# Patient Record
Sex: FEMALE | Race: BLACK OR AFRICAN AMERICAN | NOT HISPANIC OR LATINO | Employment: OTHER | URBAN - METROPOLITAN AREA
[De-identification: names, ages, dates, MRNs, and addresses within clinical notes are randomized per-mention and may not be internally consistent; named-entity substitution may affect disease eponyms.]

---

## 2017-04-27 ENCOUNTER — OFFICE VISIT (OUTPATIENT)
Dept: ENDOCRINOLOGY | Facility: CLINIC | Age: 72
End: 2017-04-27
Payer: MEDICARE

## 2017-04-27 ENCOUNTER — LAB VISIT (OUTPATIENT)
Dept: LAB | Facility: HOSPITAL | Age: 72
End: 2017-04-27
Attending: INTERNAL MEDICINE
Payer: MEDICARE

## 2017-04-27 VITALS
SYSTOLIC BLOOD PRESSURE: 160 MMHG | DIASTOLIC BLOOD PRESSURE: 84 MMHG | BODY MASS INDEX: 31.58 KG/M2 | HEART RATE: 90 BPM | HEIGHT: 62 IN | WEIGHT: 171.63 LBS

## 2017-04-27 DIAGNOSIS — I10 ESSENTIAL HYPERTENSION: ICD-10-CM

## 2017-04-27 DIAGNOSIS — Z79.4 TYPE 2 DIABETES MELLITUS WITH MICROALBUMINURIA, WITH LONG-TERM CURRENT USE OF INSULIN: Primary | ICD-10-CM

## 2017-04-27 DIAGNOSIS — E11.29 TYPE 2 DIABETES MELLITUS WITH MICROALBUMINURIA, WITH LONG-TERM CURRENT USE OF INSULIN: Primary | ICD-10-CM

## 2017-04-27 DIAGNOSIS — Z79.4 TYPE 2 DIABETES MELLITUS WITH MICROALBUMINURIA, WITH LONG-TERM CURRENT USE OF INSULIN: ICD-10-CM

## 2017-04-27 DIAGNOSIS — R80.9 TYPE 2 DIABETES MELLITUS WITH MICROALBUMINURIA, WITH LONG-TERM CURRENT USE OF INSULIN: Primary | ICD-10-CM

## 2017-04-27 DIAGNOSIS — E11.21 DIABETIC NEPHROPATHY ASSOCIATED WITH TYPE 2 DIABETES MELLITUS: ICD-10-CM

## 2017-04-27 DIAGNOSIS — E11.29 TYPE 2 DIABETES MELLITUS WITH MICROALBUMINURIA, WITH LONG-TERM CURRENT USE OF INSULIN: ICD-10-CM

## 2017-04-27 DIAGNOSIS — R80.9 TYPE 2 DIABETES MELLITUS WITH MICROALBUMINURIA, WITH LONG-TERM CURRENT USE OF INSULIN: ICD-10-CM

## 2017-04-27 LAB
ALBUMIN SERPL BCP-MCNC: 3.2 G/DL
ALP SERPL-CCNC: 93 U/L
ALT SERPL W/O P-5'-P-CCNC: 22 U/L
ANION GAP SERPL CALC-SCNC: 9 MMOL/L
AST SERPL-CCNC: 22 U/L
BILIRUB SERPL-MCNC: 0.3 MG/DL
BUN SERPL-MCNC: 18 MG/DL
CALCIUM SERPL-MCNC: 9.2 MG/DL
CHLORIDE SERPL-SCNC: 103 MMOL/L
CO2 SERPL-SCNC: 27 MMOL/L
CREAT SERPL-MCNC: 1 MG/DL
EST. GFR  (AFRICAN AMERICAN): >60 ML/MIN/1.73 M^2
EST. GFR  (NON AFRICAN AMERICAN): 56 ML/MIN/1.73 M^2
GLUCOSE SERPL-MCNC: 228 MG/DL
POTASSIUM SERPL-SCNC: 4.2 MMOL/L
PROT SERPL-MCNC: 7.6 G/DL
SODIUM SERPL-SCNC: 139 MMOL/L
T4 FREE SERPL-MCNC: 1.15 NG/DL
TSH SERPL DL<=0.005 MIU/L-ACNC: 4.83 UIU/ML

## 2017-04-27 PROCEDURE — 1160F RVW MEDS BY RX/DR IN RCRD: CPT | Mod: S$GLB,,, | Performed by: INTERNAL MEDICINE

## 2017-04-27 PROCEDURE — 4010F ACE/ARB THERAPY RXD/TAKEN: CPT | Mod: S$GLB,,, | Performed by: INTERNAL MEDICINE

## 2017-04-27 PROCEDURE — 99204 OFFICE O/P NEW MOD 45 MIN: CPT | Mod: S$GLB,,, | Performed by: INTERNAL MEDICINE

## 2017-04-27 PROCEDURE — 1126F AMNT PAIN NOTED NONE PRSNT: CPT | Mod: S$GLB,,, | Performed by: INTERNAL MEDICINE

## 2017-04-27 PROCEDURE — 99999 PR PBB SHADOW E&M-NEW PATIENT-LVL IV: CPT | Mod: PBBFAC,,, | Performed by: INTERNAL MEDICINE

## 2017-04-27 PROCEDURE — 80053 COMPREHEN METABOLIC PANEL: CPT

## 2017-04-27 PROCEDURE — 3077F SYST BP >= 140 MM HG: CPT | Mod: S$GLB,,, | Performed by: INTERNAL MEDICINE

## 2017-04-27 PROCEDURE — 83036 HEMOGLOBIN GLYCOSYLATED A1C: CPT

## 2017-04-27 PROCEDURE — 3079F DIAST BP 80-89 MM HG: CPT | Mod: S$GLB,,, | Performed by: INTERNAL MEDICINE

## 2017-04-27 PROCEDURE — 36415 COLL VENOUS BLD VENIPUNCTURE: CPT | Mod: PO

## 2017-04-27 PROCEDURE — 84439 ASSAY OF FREE THYROXINE: CPT

## 2017-04-27 PROCEDURE — 1159F MED LIST DOCD IN RCRD: CPT | Mod: S$GLB,,, | Performed by: INTERNAL MEDICINE

## 2017-04-27 PROCEDURE — 84443 ASSAY THYROID STIM HORMONE: CPT

## 2017-04-27 PROCEDURE — 3046F HEMOGLOBIN A1C LEVEL >9.0%: CPT | Mod: S$GLB,,, | Performed by: INTERNAL MEDICINE

## 2017-04-27 RX ORDER — SIMVASTATIN 10 MG/1
10 TABLET, FILM COATED ORAL NIGHTLY
Qty: 30 TABLET | Refills: 3 | Status: SHIPPED | OUTPATIENT
Start: 2017-04-27 | End: 2017-08-21 | Stop reason: SDUPTHER

## 2017-04-27 RX ORDER — INSULIN ASPART 100 [IU]/ML
INJECTION, SOLUTION INTRAVENOUS; SUBCUTANEOUS
Qty: 15 ML | Refills: 3 | Status: SHIPPED | OUTPATIENT
Start: 2017-04-27 | End: 2017-09-07 | Stop reason: SDUPTHER

## 2017-04-27 NOTE — PROGRESS NOTES
CHIEF COMPLAINT: DM 2  71 year old being seen as a new patient. DM 2 diagnosed in her 60's. On Levemir 55 daily and metformin. Only takes BG in AM. Log Shows BG are in the 200's. Eats mainly 1 meal a day. States that does not snack much. She does drink 1-2 glucerna a day. No Hypoglycemia. She does drink cranberry juice. Drinks a can of coke a day. Had an eye exam in feb. No paresthesias.       PAST MEDICAL HISTORY/PAST SURGICAL HISTORY:  Reviewed in EPIC    SOCIAL HISTORY: No tobacco. Social alcohol    FAMILY HISTORY:  No known thyroid disease. + DM 2.     MEDICATIONS/ALLERGIES: The patient's MedCard has been updated and reviewed.      ROS:   Constitutional: No recent significant weight change  Eyes: No recent visual changes  ENT: No dysphagia  Cardiovascular: Denies current anginal symptoms. She does see cardiology- Dr. Cortes  Respiratory: Denies current respiratory difficulty  Gastrointestinal: Denies recent bowel disturbances  GenitoUrinary - No dysuria. States has some polyuria.   Skin: No new skin rash  Neurologic: No focal neurologic complaints  Remainder ROS negative        PE:    GENERAL: Well developed, well nourished.  PSYCH:  appropriate mood and affect  EYES:  PERRL, EOM intact.  ENT: Nares patent, oropharynx clear, mucosa pink,   NECK: Supple, trachea midline, No palpable thyroid Nodules.   CHEST: Resp even and unlabored, CTA bilateral.  CARDIAC: RRR, S1, S2 heard, no murmurs, rubs, S3, or S4  ABDOMEN: Soft, non-tender, non-distended;  No organomegaly  VASCULAR:  DP pulses +2/4 bilaterally, no edema  NEURO: Gait steady, CN II-VII grossly intact  SKIN: No areas of breakdown, no acanthosis nigracans.  FEET: Normal monofilament. No cuts or abrasions. 2 + pedal pulse.s             ASSESSMENT/PLAN:  1. DM 2- uncontrolled with nephropathy. Repeat Urine micro. Currently getting a large dose of basal. Will decrease levemir to 45 units. Add Novolog with meals. Will do 10 units if eats a meal + correction scale.  If going to drink Glucerna as a meal will have her do 5 units. Not currently on a statin. So will start simvastatin 10 mg QHS. Discussed s/e. Up to date with eye exam. Explained basal and prandial insulin.; WIll also refer to DM education.     2. HTN- will need to increase therapy. Will repeat urine micro 1st and do labs.     3. Hypothyroidism is listed in her PMH. May have been mistakenly put in as she is not on thyroid medication. Will check TSH to verify      FOLLOWUP  DM Education.   Today CMP, A1c, TSH, urine micro  F/U 3 months with NP- CMP, A1c, FLP

## 2017-04-27 NOTE — MR AVS SNAPSHOT
[facility]  [address] [facility] [address]  [facility] [address]  Phone: [phone]  Fax: [phone]                  Swetha Thompson   2017 8:00 AM   Office Visit    Description:  Female : 1945   Provider:  Salvador Lim DO   Department:  [facility]           Reason for Visit     Diabetes Mellitus           Diagnoses this Visit        Comments    Type 2 diabetes mellitus with microalbuminuria, with long-term current use of insulin    -  Primary     Essential hypertension                To Do List           Future Appointments        Provider Department Dept Phone    2017 10:30 AM LAB, [place] [facility] [phone]    5/3/2017 9:00 AM [place], ENDOCRINE EDUCATOR [facility] [phone]    2017 8:15 AM LAB, [place] [facility] [phone]    2017 8:30 AM LYN Carter,FNP-C [facility] Endocrinology [phone]      Goals (5 Years of Data)     None       These Medications        Disp Refills Start End    insulin detemir (LEVEMIR FLEXPEN) 100 unit/mL (3 mL) SubQ InPn pen 3 Box 3 2017     Inject 45 units SQ daily. Supply pen needles    Pharmacy: [facility]/pharmacy #[id number] - [place] - [address] AT [facility] Ph #: [phone]       insulin aspart (NOVOLOG FLEXPEN) 100 unit/mL InPn pen 15 mL 3 2017     10 units with meals plus sliding scale. If meal is a can of Glucerna, then takes 5 units. Supply with pen needles    Pharmacy: [facility]/pharmacy #[id number] - [place] - [address] AT [facility] Ph #: [phone]       simvastatin (ZOCOR) 10 MG tablet 30 tablet 3 2017    Take 1 tablet (10 mg total) by mouth every evening. - Oral    Pharmacy: [facility]/pharmacy #[id number] - [place] - [address] [facility] Ph #: [phone]         [facility]     [facility] Nurse Care Line -  Assistance  Unless otherwise directed by your  "provider, please contact [facility], our nurse care line that is available for 24/7 assistance.     Registered nurses in the [facility] provide: appointment scheduling, clinical advisement, health education, and other advisory services.  Call: [phone] (toll free)               Medications           Message regarding Medications     Verify the changes and/or additions to your medication regime listed below are the same as discussed with your clinician today.  If any of these changes or additions are incorrect, please notify your healthcare provider.        START taking these NEW medications        Refills    insulin aspart (NOVOLOG FLEXPEN) 100 unit/mL InPn pen 3    Sig: 10 units with meals plus sliding scale. If meal is a can of Glucerna, then takes 5 units. Supply with pen needles    Class: Normal    simvastatin (ZOCOR) 10 MG tablet 3    Sig: Take 1 tablet (10 mg total) by mouth every evening.    Class: Normal    Route: Oral      CHANGE how you are taking these medications     Start Taking Instead of    insulin detemir (LEVEMIR FLEXPEN) 100 unit/mL (3 mL) SubQ InPn pen insulin detemir (LEVEMIR FLEXPEN) 100 unit/mL (3 mL) SubQ InPn pen    Dosage:  Inject 45 units SQ daily. Supply pen needles Dosage:  Inject 55 units SQ daily    Reason for Change:  Reorder            Verify that the below list of medications is an accurate representation of the medications you are currently taking.  If none reported, the list may be blank. If incorrect, please contact your healthcare provider. Carry this list with you in case of emergency.           Current Medications     amitriptyline (ELAVIL) 25 MG tablet Take 25 mg by mouth every evening.    BD ULTRA-FINE YULI PEN NEEDLES 32 gauge x 5/32" Ndle USE WITH LEVEMIR DAILY    bisacodyl (DULCOLAX) 5 mg EC tablet Take 5 mg by mouth once.    BUTRANS 7.5 mcg/hour PTWK APPLY 1 PATCH ONCE WEEKLY    cyclobenzaprine (FLEXERIL) 10 MG tablet Take 1 tablet by mouth daily as " "needed.    diclofenac (VOLTAREN) 75 MG EC tablet Take 75 mg by mouth once daily.    furosemide (LASIX) 20 MG tablet Take 1 tablet (20 mg total) by mouth once daily.    GRALISE 600 mg Tb24 Take 3 tablets by mouth once daily.    insulin detemir (LEVEMIR FLEXPEN) 100 unit/mL (3 mL) SubQ InPn pen Inject 45 units SQ daily. Supply pen needles    lorazepam (ATIVAN) 1 MG tablet Take 1 mg by mouth daily as needed for Anxiety.    losartan (COZAAR) 50 MG tablet Take 1 tablet (50 mg total) by mouth once daily.    metformin (GLUCOPHAGE-XR) 500 MG 24 hr tablet Take 2 tablets (1,000 mg total) by mouth daily with breakfast.    pantoprazole (PROTONIX) 40 MG tablet Take 1 tablet (40 mg total) by mouth once daily.    pen needle, diabetic (BD ULTRA-FINE YULI PEN NEEDLES) 32 gauge x 5/32" Ndle USE WITH LEVEMIR DAILY    sucralfate (CARAFATE) 1 gram tablet TAKE 1 GRAM BY MOUTH TWICE A DAY FOR 14 DAYS    sumatriptan (IMITREX) 100 MG tablet TAKE 1 TABLET EVERY 2 HOURS AS NEEDED FOR MIGRAINE. MAXIMUM OF 2 TABLETS DAILY.     tizanidine (ZANAFLEX) 4 MG tablet Take 1-2 tablets by mouth every evening.    tramadol (ULTRAM) 50 mg tablet Take 50 mg by mouth every 6 (six) hours as needed for Pain.    insulin aspart (NOVOLOG FLEXPEN) 100 unit/mL InPn pen 10 units with meals plus sliding scale. If meal is a can of Glucerna, then takes 5 units. Supply with pen needles    simvastatin (ZOCOR) 10 MG tablet Take 1 tablet (10 mg total) by mouth every evening.           Clinical Reference Information           Your Vitals Were     BP Pulse Height Weight BMI    160/84 (BP Location: Right arm, Patient Position: Sitting, BP Method: Manual) 90 5' 2" (1.575 m) 77.9 kg (171 lb 10.1 oz) 31.39 kg/m2      Blood Pressure          Most Recent Value    BP  (!)  160/84      Allergies as of 4/27/2017     Codeine    Hydrocodone      Immunizations Administered on Date of Encounter - 4/27/2017     None      Orders Placed During Today's Visit      Normal Orders This Visit    " Ambulatory Referral to Diabetes Education      DIABETES FOOT EXAM     Future Labs/Procedures Expected by Expires    Comprehensive metabolic panel  4/27/2017 4/28/2018    Comprehensive metabolic panel  4/27/2017 4/28/2018    Hemoglobin A1c  4/27/2017 4/28/2018    Hemoglobin A1c  4/27/2017 4/27/2018    Lipid panel  4/27/2017 4/27/2018    Microalbumin/creatinine urine ratio  4/27/2017 4/28/2018    TSH  4/27/2017 4/27/2018      [facility] Sign-Up     Activating your [facility] account is as easy as 1-2-3!     1) Visit [email or web address], select Sign Up Now, enter this activation code and your date of birth, then select Next.  [email or web address]  Expires: 6/8/2017  1:36 PM      2) Create a username and password to use when you visit [facility] in the future and select a security question in case you lose your password and select Next.    3) Enter your e-mail address and click Sign Up!    Additional Information  If you have questions, please e-mail [email or web address] or call [phone] to talk to our [facility] staff. Remember, [facility] is NOT to be used for urgent needs. For medical emergencies, dial 911.         Language Assistance Services     ATTENTION: Language assistance services are available, free of charge. Please call [phone].      ATENCIÓN: Si habla español, tiene a yun disposición servicios gratuitos de asistencia lingüística. Llame al [phone].     CHÚ Ý: N?u b?n nói Ti?ng Vi?t, có các d?ch v? h? tr? ngôn ng? mi?n phí dành cho b?n. G?i s? [phone].         [facility] complies with applicable Federal civil rights laws and does not discriminate on the basis of race, color, national origin, age, disability, or sex.

## 2017-04-27 NOTE — LETTER
April 27, 2017      [address]  [place] [address]           [facility]  [address] [facility] [address] [place] [address]  Phone: [phone]  Fax: [phone]          Patient: Swetha Thompson   MR Number: [id number]   YOB: 1945   Date of Visit: 4/27/2017       Dear Dr. Beto Parsons II:    Thank you for referring Swetha Thompson to me for evaluation. Attached you will find relevant portions of my assessment and plan of care.    If you have questions, please do not hesitate to call me. I look forward to following Swehta Thompson along with you.    Sincerely,    Salvador Lim,     Enclosure  CC:  No Recipients    If you would like to receive this communication electronically, please contact [email or web address] or [phone] to request more information on [employer] Link access.    For providers and/or their staff who would like to refer a patient to [facility], please contact us through our one-stop-shop provider referral line, [facility], at [phone].    If you feel you have received this communication in error or would no longer like to receive these types of communications, please e-mail [email or web address]

## 2017-04-28 LAB
ESTIMATED AVG GLUCOSE: 295 MG/DL
HBA1C MFR BLD HPLC: 11.9 %

## 2017-04-30 ENCOUNTER — TELEPHONE (OUTPATIENT)
Dept: ENDOCRINOLOGY | Facility: CLINIC | Age: 72
End: 2017-04-30

## 2017-04-30 DIAGNOSIS — R94.6 ABNORMAL THYROID FUNCTION TEST: Primary | ICD-10-CM

## 2017-05-03 ENCOUNTER — CLINICAL SUPPORT (OUTPATIENT)
Dept: DIABETES | Facility: CLINIC | Age: 72
End: 2017-05-03
Payer: MEDICARE

## 2017-05-03 VITALS — BODY MASS INDEX: 31.11 KG/M2 | HEIGHT: 62 IN | WEIGHT: 169.06 LBS

## 2017-05-03 PROCEDURE — G0108 DIAB MANAGE TRN  PER INDIV: HCPCS | Mod: S$GLB,,, | Performed by: DIETITIAN, REGISTERED

## 2017-05-03 NOTE — PROGRESS NOTES
"Patient here today for diabetic education, she does not bring glucose logs. Reports her glucose this morning was "very low" and when asked what glucose was she states 169 mg/dL.  Patient with limited appetite and very sporadic eating, however weight relatively stable per patient.  Patient was started on Novolog at meal time 4/27/17 (10 units with regular meal, 5 units when drinking Glucerna) and has had some confusion with medication.  Patient has only taken 5 units of Novolog since prescribed and took Novolog after finishing Glucerna.    Most of today's visit spent reviewing medication and dosing.  Patient not taking Levemir regularly either.  She was not aware that Novolog was to be taken each time she ate (small meal/Glucerna = 5 units, regular meal = 10 units).  All medications and dosages written out for patient on glucose log to help with compliance. Patient asked to check glucose a minimum of 2 times daily and record and bring to each appointment (will see Carmelita Gómez July 2017).     05/03/17 0000   Diabetes Education Visit   Diabetes Education Record Assessment/Progress Initial   Diabetes Type   Diabetes Type  Type II   Diabetes History   Diabetes Diagnosis >10 years  (Diagnosed in her 60s by lab work)   Nutrition   Meal Planning skipping meals;drinks regular soda;artificial sweeteners  (Patient has a very inconsistent eating pattern, will skip meals daily, reports decreased appetite, regular use of Glucerna)   Meal Plan 24 Hour Recall - Breakfast 2 cups coffee with creamer and Splenda, nothing to eat this morning (9am appt) OR 2 slices toast with margarin late morning some days--over course of day will have 5-10 cups coffee   Meal Plan 24 Hour Recall - Lunch Late lunch (1-2pm) Glucerna shake or 2 oatmeal cookies, drinks 2 (12 fl oz) cans regular Coke a week, and white grape juice (4 fl oz a few days a week)   Meal Plan 24 Hour Recall - Dinner Family will bring chicken sandwich or salad (7-8pm) "   Monitoring    Monitoring Other  ([facility] glucometer-denies any issues with glucometer or supplies, less than 2 yrs old)   Self Monitoring  Checks 1-2 times daily   Blood Glucose Logs No  (Patient did not have glucometer for me to review, reports glucose this morning was 169 and patient considers this to be extremely low)   Exercise    Exercise Type walking   Intensity Low   Frequency 3-5 Times per week   Duration 15 min   Current Diabetes Treatment    Current Treatment Oral Medication;Injectable;Insulin  (NOT TAKING AS PRESCRIBED: metformin, Levemir 45 units daily, Novolog at meals (5 or 10 units depending on what eating)--currently has not taken Novolog since Thursday and taken after eating)   Social History   Preferred Learning Method Face to Face   Primary Support Self   Occupation Previously worked at [employer], but back injury in 2014 has prevented her from working   Alcohol Use Never   Barriers to Change   Barriers to Change Cognitive   Learning Challenges  Literacy   Readiness to Learn    Readiness to Learn  Eager   Cultural Influences   Cultural Influences None   Diabetes Education Assessment/Progress   Acute Complications (preventing, detecting, and treating acute complications) DC  (Long discussion on signs and symptoms of hypoglycemia and proper methods to treat. Explained to pt that hypoglycemia is generally treated for glucose levels < 70 mg/dL (pt in agreement to not treat at 150s or 160s but feels she needs to treat <100))   Chronic Complications (preventing, detecting, and treating chronic complications) DC  (Discussed importance of managing DM to prevent long term side effects such as retinopathy, neuropathy, nephropathy)   Diabetes Disease Process (diabetes disease process and treatment options) DC  (Discussed goal pre meal glucose levels, and goal Hgb A1c)   Nutrition (Incorporating nutritional management into one's lifestyle) DC;W  (Reviewed big meal and small meal concept with patient--patient  reports significantly decreased appetite (wt relatively stable).  Pt not in agreement to discontinue Coke but will discontinue fruit juice.   Physical Activity (incorporating physical activity into one's lifestyle) DC  (Discussed exercise in correlation with weight control and better glucose management)   Medications (states correct name, dose, onset, peak, duration, side effects & timing of meds) DC;W  (Long discussion on patient's current medication regimen-how to take each medication and importance of compliance with medication)   Monitoring (monitoring blood glucose/other parameters & using results) DC;W  (Patient given additional glucose logs with medication regimen written out to follow and encouraged to bring completed log to each appt for review and medication adjustment.  Pt to contact dept if glucose levels < 100 consistently)   Goal Setting and Problem Solving (verbalizes behavior change strategies & sets realistic goals) DC  (Patient will take Levemir 45 units daily and Novolog when eating (5 units small meal or Glucerna, 10 units regular meal))   Goals   Physical Activity In Progress  (15 minutes of walking a few days a week, pt willing to increase frequency)   Monitoring In Progress   Medications Set   Start Date 05/03/17  (Patient will begin taking medication as prescribed)   Diabetes Care Plan/Intervention   Education Plan/Intervention In F/U DSMT  (Patient will follow up in July 2017 with A Rico NP for diabetes and labs rechecked.  Patient to call me with questions or if additional diabetic education desired)   Diabetes Meal Plan   Carbohydrate Per Meal 30-45g   Carbohydrate Per Snack  7-15g   Education Units of Time    Time Spent 60 min

## 2017-07-20 ENCOUNTER — LAB VISIT (OUTPATIENT)
Dept: LAB | Facility: HOSPITAL | Age: 72
End: 2017-07-20
Attending: INTERNAL MEDICINE
Payer: MEDICARE

## 2017-07-20 DIAGNOSIS — R94.6 ABNORMAL THYROID FUNCTION TEST: ICD-10-CM

## 2017-07-20 DIAGNOSIS — I10 ESSENTIAL HYPERTENSION: ICD-10-CM

## 2017-07-20 DIAGNOSIS — Z79.4 TYPE 2 DIABETES MELLITUS WITH MICROALBUMINURIA, WITH LONG-TERM CURRENT USE OF INSULIN: ICD-10-CM

## 2017-07-20 DIAGNOSIS — R80.9 TYPE 2 DIABETES MELLITUS WITH MICROALBUMINURIA, WITH LONG-TERM CURRENT USE OF INSULIN: ICD-10-CM

## 2017-07-20 DIAGNOSIS — E11.29 TYPE 2 DIABETES MELLITUS WITH MICROALBUMINURIA, WITH LONG-TERM CURRENT USE OF INSULIN: ICD-10-CM

## 2017-07-20 LAB
ALBUMIN SERPL BCP-MCNC: 3.1 G/DL
ALP SERPL-CCNC: 76 U/L
ALT SERPL W/O P-5'-P-CCNC: 11 U/L
ANION GAP SERPL CALC-SCNC: 9 MMOL/L
AST SERPL-CCNC: 14 U/L
BILIRUB SERPL-MCNC: 0.4 MG/DL
BUN SERPL-MCNC: 21 MG/DL
CALCIUM SERPL-MCNC: 9.2 MG/DL
CHLORIDE SERPL-SCNC: 104 MMOL/L
CHOLEST/HDLC SERPL: 2.9 {RATIO}
CO2 SERPL-SCNC: 27 MMOL/L
CREAT SERPL-MCNC: 1.1 MG/DL
EST. GFR  (AFRICAN AMERICAN): 57.6 ML/MIN/1.73 M^2
EST. GFR  (NON AFRICAN AMERICAN): 49.9 ML/MIN/1.73 M^2
ESTIMATED AVG GLUCOSE: 206 MG/DL
GLUCOSE SERPL-MCNC: 155 MG/DL
HBA1C MFR BLD HPLC: 8.8 %
HDL/CHOLESTEROL RATIO: 34.3 %
HDLC SERPL-MCNC: 172 MG/DL
HDLC SERPL-MCNC: 59 MG/DL
LDLC SERPL CALC-MCNC: 97.4 MG/DL
NONHDLC SERPL-MCNC: 113 MG/DL
POTASSIUM SERPL-SCNC: 4.5 MMOL/L
PROT SERPL-MCNC: 7 G/DL
SODIUM SERPL-SCNC: 140 MMOL/L
THYROPEROXIDASE IGG SERPL-ACNC: <6 IU/ML
TRIGL SERPL-MCNC: 78 MG/DL
TSH SERPL DL<=0.005 MIU/L-ACNC: 3.48 UIU/ML

## 2017-07-20 PROCEDURE — 36415 COLL VENOUS BLD VENIPUNCTURE: CPT | Mod: PO

## 2017-07-20 PROCEDURE — 80053 COMPREHEN METABOLIC PANEL: CPT

## 2017-07-20 PROCEDURE — 86376 MICROSOMAL ANTIBODY EACH: CPT

## 2017-07-20 PROCEDURE — 80061 LIPID PANEL: CPT

## 2017-07-20 PROCEDURE — 84443 ASSAY THYROID STIM HORMONE: CPT

## 2017-07-20 PROCEDURE — 83036 HEMOGLOBIN GLYCOSYLATED A1C: CPT

## 2017-07-27 ENCOUNTER — OFFICE VISIT (OUTPATIENT)
Dept: ENDOCRINOLOGY | Facility: CLINIC | Age: 72
End: 2017-07-27
Payer: MEDICARE

## 2017-07-27 VITALS
HEIGHT: 62 IN | SYSTOLIC BLOOD PRESSURE: 144 MMHG | BODY MASS INDEX: 33.17 KG/M2 | HEART RATE: 85 BPM | WEIGHT: 180.25 LBS | DIASTOLIC BLOOD PRESSURE: 80 MMHG

## 2017-07-27 DIAGNOSIS — E66.9 OBESITY (BMI 30.0-34.9): ICD-10-CM

## 2017-07-27 DIAGNOSIS — R80.9 TYPE 2 DIABETES MELLITUS WITH MICROALBUMINURIA, WITH LONG-TERM CURRENT USE OF INSULIN: Primary | ICD-10-CM

## 2017-07-27 DIAGNOSIS — E11.29 TYPE 2 DIABETES MELLITUS WITH MICROALBUMINURIA, WITH LONG-TERM CURRENT USE OF INSULIN: Primary | ICD-10-CM

## 2017-07-27 DIAGNOSIS — I10 HYPERTENSION, WELL CONTROLLED: ICD-10-CM

## 2017-07-27 DIAGNOSIS — Z79.4 TYPE 2 DIABETES MELLITUS WITH MICROALBUMINURIA, WITH LONG-TERM CURRENT USE OF INSULIN: Primary | ICD-10-CM

## 2017-07-27 PROBLEM — E66.811 OBESITY (BMI 30.0-34.9): Status: ACTIVE | Noted: 2017-07-27

## 2017-07-27 PROCEDURE — 99214 OFFICE O/P EST MOD 30 MIN: CPT | Mod: S$GLB,,, | Performed by: NURSE PRACTITIONER

## 2017-07-27 PROCEDURE — 1159F MED LIST DOCD IN RCRD: CPT | Mod: S$GLB,,, | Performed by: NURSE PRACTITIONER

## 2017-07-27 PROCEDURE — 3045F PR MOST RECENT HEMOGLOBIN A1C LEVEL 7.0-9.0%: CPT | Mod: S$GLB,,, | Performed by: NURSE PRACTITIONER

## 2017-07-27 PROCEDURE — 99999 PR PBB SHADOW E&M-EST. PATIENT-LVL V: CPT | Mod: PBBFAC,,, | Performed by: NURSE PRACTITIONER

## 2017-07-27 PROCEDURE — 1126F AMNT PAIN NOTED NONE PRSNT: CPT | Mod: S$GLB,,, | Performed by: NURSE PRACTITIONER

## 2017-07-27 PROCEDURE — 4010F ACE/ARB THERAPY RXD/TAKEN: CPT | Mod: S$GLB,,, | Performed by: NURSE PRACTITIONER

## 2017-07-27 RX ORDER — METFORMIN HYDROCHLORIDE 500 MG/1
TABLET, EXTENDED RELEASE ORAL
Qty: 270 TABLET | Refills: 3 | Status: SHIPPED | OUTPATIENT
Start: 2017-07-27 | End: 2017-11-02 | Stop reason: SDUPTHER

## 2017-07-27 NOTE — PATIENT INSTRUCTIONS
Hypoglycemia (Low Blood Sugar)     Fast-acting sugar includes a cup of nonfat milk.     Too little sugar (glucose) in your blood is called hypoglycemia or low blood sugar. Low blood sugar usually means anything lower than 70 mg/dL. Talk with your healthcare provider about your target range and what level is too low for you. Diabetes itself doesnt cause low blood sugar. But some of the treatments for diabetes, such as pills or insulin, may raise your risk for it. Low blood sugar may cause you to pass out or have a seizure. So always treat low blood sugar right away, but don't overeat.  Special note: Always carry a source of fast-acting sugar and a snack in case of hypoglycemia.   What you may notice  If you have low blood sugar, you may have one or more of these symptoms:  · Shakiness or dizziness  · Cold, clammy skin or sweating  · Feelings of hunger  · Headache  · Nervousness  · A hard, fast heartbeat  · Weakness  · Confusion or irritability  · Blurred vision  · Having nightmares or waking up confused or sweating  · Numbness or tingling in the lips or tongue  What you should do  Here are tips to follow if you have hypoglycemia:   · First check your blood sugar. If it is too low (out of your target range), eat or drink 15 to 20 grams of fast-acting sugar. This may be 3 to 4 glucose tablets, 4 ounces (half a cup) of fruit juice or regular (nondiet) soda, 8 ounces (1 cup) of fat-free milk, or 1 tablespoon of honey. Dont take more than this, or your blood sugar may go too high.  · Wait 15 minutes. Then recheck your blood sugar if you can.  · If your blood sugar is still too low, repeat the steps above and check your blood sugar again. If your blood sugar still has not returned to your target range, contact your healthcare provider or seek emergency care.  · Once your blood sugar returns to target range, eat a snack or meal.  Preventing low blood sugar  Things you can do include the following:   · If your condition  needs a strict treatment plan, eat your meals and snacks at the same times each day. Dont skip meals!  · If your treatment plan lets you change when you eat and what you eat, learn how to change the time and dose of your rapid-acting insulin to match this.   · Ask your healthcare provider if it is safe for you to drink alcohol. Never drink on an empty stomach.  · Take your medicine at the prescribed times.  · Always carry a source of fast-acting sugar and a snack when youre away from home.  Other things to do  Additional tips include the following:  · Carry a medical ID card, a compact USB drive, or wear a medical alert bracelet or necklace. It should say that you have diabetes. It should also say what to do if you pass out or have a seizure.  · Make sure your family, friends, and coworkers know the signs of low blood sugar. Tell them what to do if your blood sugar falls very low and you cant treat yourself.  · Keep a glucagon emergency kit handy. Be sure your family, friends, and coworkers know how and when to use it. Check it regularly and replace the glucagon before it expires.  · Talk with your health care team about other things you can do to prevent low blood sugar.     If you have unexplained hypoglycemia or hypoglycemia several times, call your healthcare provider.   Date Last Reviewed: 5/1/2016  © 5936-8331 [employer]. [address] [place] [address], [place] [address]. All rights reserved. This information is not intended as a substitute for professional medical care. Always follow your healthcare professional's instructions.

## 2017-07-27 NOTE — PROGRESS NOTES
"CC: Ms. Swetha Thompson arrives today for management of Type 2 DM and review of chronic medical conditions, as listed in the visit diagnosis section of this encounter.     HPI: Ms. Swetha Thompson was diagnosed with Type 2 DM in her 50s.  Initial treatment consisted of oral medications and insulin was added in ~2011. + FH of DM in mother, brother, sister, maternal GM. Denies hospitalizations due to DM.      Last seen in endocrine by Dr. Lim in 4/2017.   She is new to me today.    Novolog was added to her regimen at last visit.      BG readings are checked 1x/day.            Hypoglycemia: No    Missing Insulin/PO medication doses: Not always taking with breakfast (2 slices toast, coffee)  Timing prandial insulin 5-15 minutes before meals: yes    Exercise: No formal but active with 5 y/o grandson.     Dietary Habits: Eats 1-2 meals/day. States that she isn't a big eater. Snacks after dinner on cookies, sandwich, or Glucerna. Drinks Coke. .    Last DM education appointment: 5/2017      CURRENT DIABETIC MEDS: Levemir 45 units QAM, Novolog 10 units AC (5 units with Glucerna), metformin XR 1000 mg daily  Vial or pen: pen  Glucometer type: unsure    Previous DM treatments:    Last Eye Exam: 2016, no   Last Podiatry Exam: 2017    REVIEW OF SYSTEMS  Constitutional: no c/o fatigue, weakness, or weight loss. + 9# weight gain  Eyes: denies visual disturbances.  Cardiac: no palpitations or chest pain.  Respiratory: no dyspnea. + dry cough that waxes and wanes.   GI: no c/o abdominal pain or nausea. Denies h/o pancreatitis.   Skin: no lesions or rashes.  Neuro: no numbness, tingling, or parasthesia in lower extremities. + numbness in R hand that comes and goes   Endocrine: denies polyphagia, polydipsia, polyuria      Personally reviewed Past Medical, Surgical, Social History.    Vital Signs  BP (!) 144/80   Pulse 85   Ht 5' 2" (1.575 m)   Wt 81.8 kg (180 lb 3.6 oz)   BMI 32.96 kg/m²     Personally reviewed the below " labs:    Hemoglobin A1C   Date Value Ref Range Status   07/20/2017 8.8 (H) 4.0 - 5.6 % Final     Comment:     According to ADA guidelines, hemoglobin A1c <7.0% represents  optimal control in non-pregnant diabetic patients. Different  metrics may apply to specific patient populations.   Standards of Medical Care in Diabetes-2016.  For the purpose of screening for the presence of diabetes:  <5.7%     Consistent with the absence of diabetes  5.7-6.4%  Consistent with increasing risk for diabetes   (prediabetes)  >or=6.5%  Consistent with diabetes  Currently, no consensus exists for use of hemoglobin A1c  for diagnosis of diabetes for children.  This Hemoglobin A1c assay has significant interference with fetal   hemoglobin   (HbF). The results are invalid for patients with abnormal amounts of   HbF,   including those with known Hereditary Persistence   of Fetal Hemoglobin. Heterozygous hemoglobin variants (HbAS, HbAC,   HbAD, HbAE, HbA2) do not significantly interfere with this assay;   however, presence of multiple variants in a sample may impact the %   interference.     04/27/2017 11.9 (H) 4.5 - 6.2 % Final     Comment:     According to ADA guidelines, hemoglobin A1C <7.0% represents  optimal control in non-pregnant diabetic patients.  Different  metrics may apply to specific populations.   Standards of Medical Care in Diabetes - 2016.  For the purpose of screening for the presence of diabetes:  <5.7%     Consistent with the absence of diabetes  5.7-6.4%  Consistent with increasing risk for diabetes   (prediabetes)  >or=6.5%  Consistent with diabetes  Currently no consensus exists for use of hemoglobin A1C  for diagnosis of diabetes for children.     10/01/2016 12.6 (H) 0.0 - 5.6 % Final     Comment:     Reference Interval:  5.0 - 5.6 Normal   5.7 - 6.4 High Risk   > 6.5 Diabetic    Hgb A1c results are standardized based on the (NGSP) National   Glycohemoglobin Standardization Program.    Hemoglobin A1C levels are  related to mean serum/plasma glucose   during the preceding 2-3 months.            Chemistry        Component Value Date/Time     07/20/2017 0811    K 4.5 07/20/2017 0811     07/20/2017 0811    CO2 27 07/20/2017 0811    BUN 21 07/20/2017 0811    CREATININE 1.1 07/20/2017 0811     (H) 07/20/2017 0811        Component Value Date/Time    CALCIUM 9.2 07/20/2017 0811    ALKPHOS 76 07/20/2017 0811    AST 14 07/20/2017 0811    ALT 11 07/20/2017 0811    BILITOT 0.4 07/20/2017 0811    ESTGFRAFRICA 57.6 (A) 07/20/2017 0811    EGFRNONAA 49.9 (A) 07/20/2017 0811          Lab Results   Component Value Date    CHOL 172 07/20/2017    CHOL 167 10/01/2016     Lab Results   Component Value Date    HDL 59 07/20/2017    HDL 77 (H) 10/01/2016     Lab Results   Component Value Date    LDLCALC 97.4 07/20/2017    LDLCALC 76.8 10/01/2016     Lab Results   Component Value Date    TRIG 78 07/20/2017    TRIG 66 10/01/2016     Lab Results   Component Value Date    CHOLHDL 34.3 07/20/2017    CHOLHDL 46.1 10/01/2016       Lab Results   Component Value Date    MICALBCREAT 61.9 (H) 04/27/2017     Lab Results   Component Value Date    TSH 3.485 07/20/2017       CrCl cannot be calculated (Patient's most recent sCr result is older than the maximum 7 days allowed.).    No results found for: [id number]      PHYSICAL EXAMINATION  Constitutional: Appears well, no distress  Neck: Supple, trachea midline; no thyromegaly or nodules.   Respiratory: CTA, even and unlabored.  Cardiovascular: RRR, no murmurs, no carotid bruits. DP pulses  2+ bilaterally; no edema.    Lymph: no cervical or supraclavicular lymphadenopathy  Skin: warm and dry; no lipohypertrophy, or acanthosis nigracans observed.  Neuro: DTR 2+ BUE/2+BLE.  Feet: appropriate footwear.    Assessment/Plan  1. Type 2 diabetes mellitus with microalbuminuria, with long-term current use of insulin  -- A1c has decreased 3 points.   -- continue current insulin doses. Advised her to take  Novolog with all meals.   -- contiue metformin 1000 mg with breakfast. Take 500 mg with dinner.   -- advised her to decrease snacking on sweets/high carb foods.   -- check BG 4x/day    -- Discussed diagnosis of DM, A1c goals, progression of disease, long term complications and tx options.  Advised patient to check BG before activities, such as driving or exercise.  -- Reviewed hypoglycemia management: treat with 1/2 glass of juice, 1/2 can regular coke, or 4 glucose tablets. Monitor and repeat treatment every 15 minutes until BG is >70 Then have a snack, which includes a complex carbohydrate and protein.  -- takes ARB, statin     2. Hypertension, well controlled  -- elevated today. Continue to monitor  -- on ARB   3. Obesity (BMI 30.0-34.9)  -- increases insulin resistance  Body mass index is 32.96 kg/m².       FOLLOW UP  Return in about 3 months (around 10/27/2017).   Patient instructed to bring BG logs to each follow up   Patient encouraged to call for any BG/medication issues, concerns, or questions.    Orders Placed This Encounter   Procedures    Hemoglobin A1c    Comprehensive metabolic panel    Lipid panel

## 2017-08-05 ENCOUNTER — TELEPHONE (OUTPATIENT)
Dept: ENDOCRINOLOGY | Facility: CLINIC | Age: 72
End: 2017-08-05

## 2017-08-21 RX ORDER — SIMVASTATIN 10 MG/1
10 TABLET, FILM COATED ORAL NIGHTLY
Qty: 30 TABLET | Refills: 3 | Status: SHIPPED | OUTPATIENT
Start: 2017-08-21 | End: 2017-12-27 | Stop reason: SDUPTHER

## 2017-09-07 RX ORDER — INSULIN ASPART 100 [IU]/ML
INJECTION, SOLUTION INTRAVENOUS; SUBCUTANEOUS
Qty: 15 SYRINGE | Refills: 3 | Status: SHIPPED | OUTPATIENT
Start: 2017-09-07 | End: 2017-12-31 | Stop reason: SDUPTHER

## 2017-10-31 ENCOUNTER — LAB VISIT (OUTPATIENT)
Dept: LAB | Facility: HOSPITAL | Age: 72
End: 2017-10-31
Attending: NURSE PRACTITIONER
Payer: MEDICARE

## 2017-10-31 DIAGNOSIS — E11.29 TYPE 2 DIABETES MELLITUS WITH MICROALBUMINURIA, WITH LONG-TERM CURRENT USE OF INSULIN: ICD-10-CM

## 2017-10-31 DIAGNOSIS — Z79.4 TYPE 2 DIABETES MELLITUS WITH MICROALBUMINURIA, WITH LONG-TERM CURRENT USE OF INSULIN: ICD-10-CM

## 2017-10-31 DIAGNOSIS — R80.9 TYPE 2 DIABETES MELLITUS WITH MICROALBUMINURIA, WITH LONG-TERM CURRENT USE OF INSULIN: ICD-10-CM

## 2017-10-31 LAB
ALBUMIN SERPL BCP-MCNC: 3.3 G/DL
ALP SERPL-CCNC: 73 U/L
ALT SERPL W/O P-5'-P-CCNC: 19 U/L
ANION GAP SERPL CALC-SCNC: 10 MMOL/L
AST SERPL-CCNC: 23 U/L
BILIRUB SERPL-MCNC: 0.4 MG/DL
BUN SERPL-MCNC: 10 MG/DL
CALCIUM SERPL-MCNC: 8.9 MG/DL
CHLORIDE SERPL-SCNC: 103 MMOL/L
CHOLEST SERPL-MCNC: 179 MG/DL
CHOLEST/HDLC SERPL: 3 {RATIO}
CO2 SERPL-SCNC: 28 MMOL/L
CREAT SERPL-MCNC: 1 MG/DL
EST. GFR  (AFRICAN AMERICAN): >60 ML/MIN/1.73 M^2
EST. GFR  (NON AFRICAN AMERICAN): 56 ML/MIN/1.73 M^2
ESTIMATED AVG GLUCOSE: 269 MG/DL
GLUCOSE SERPL-MCNC: 165 MG/DL
HBA1C MFR BLD HPLC: 11 %
HDLC SERPL-MCNC: 59 MG/DL
HDLC SERPL: 33 %
LDLC SERPL CALC-MCNC: 103 MG/DL
NONHDLC SERPL-MCNC: 120 MG/DL
POTASSIUM SERPL-SCNC: 3.3 MMOL/L
PROT SERPL-MCNC: 7.2 G/DL
SODIUM SERPL-SCNC: 141 MMOL/L
TRIGL SERPL-MCNC: 85 MG/DL

## 2017-10-31 PROCEDURE — 80061 LIPID PANEL: CPT

## 2017-10-31 PROCEDURE — 80053 COMPREHEN METABOLIC PANEL: CPT

## 2017-10-31 PROCEDURE — 36415 COLL VENOUS BLD VENIPUNCTURE: CPT | Mod: PO

## 2017-10-31 PROCEDURE — 83036 HEMOGLOBIN GLYCOSYLATED A1C: CPT

## 2017-11-02 ENCOUNTER — OFFICE VISIT (OUTPATIENT)
Dept: ENDOCRINOLOGY | Facility: CLINIC | Age: 72
End: 2017-11-02
Payer: MEDICARE

## 2017-11-02 VITALS
SYSTOLIC BLOOD PRESSURE: 150 MMHG | BODY MASS INDEX: 33.47 KG/M2 | DIASTOLIC BLOOD PRESSURE: 80 MMHG | WEIGHT: 181.88 LBS | HEART RATE: 91 BPM | HEIGHT: 62 IN

## 2017-11-02 DIAGNOSIS — I10 HYPERTENSION, WELL CONTROLLED: ICD-10-CM

## 2017-11-02 DIAGNOSIS — E11.29 TYPE 2 DIABETES MELLITUS WITH MICROALBUMINURIA, WITH LONG-TERM CURRENT USE OF INSULIN: Primary | ICD-10-CM

## 2017-11-02 DIAGNOSIS — R80.9 TYPE 2 DIABETES MELLITUS WITH MICROALBUMINURIA, WITH LONG-TERM CURRENT USE OF INSULIN: Primary | ICD-10-CM

## 2017-11-02 DIAGNOSIS — E66.9 OBESITY (BMI 30.0-34.9): ICD-10-CM

## 2017-11-02 DIAGNOSIS — Z79.4 TYPE 2 DIABETES MELLITUS WITH MICROALBUMINURIA, WITH LONG-TERM CURRENT USE OF INSULIN: Primary | ICD-10-CM

## 2017-11-02 PROCEDURE — 99214 OFFICE O/P EST MOD 30 MIN: CPT | Mod: S$GLB,,, | Performed by: NURSE PRACTITIONER

## 2017-11-02 PROCEDURE — 99999 PR PBB SHADOW E&M-EST. PATIENT-LVL IV: CPT | Mod: PBBFAC,,, | Performed by: NURSE PRACTITIONER

## 2017-11-02 RX ORDER — METFORMIN HYDROCHLORIDE 500 MG/1
TABLET, EXTENDED RELEASE ORAL
Qty: 360 TABLET | Refills: 3 | Status: SHIPPED | OUTPATIENT
Start: 2017-11-02 | End: 2018-09-10 | Stop reason: SDUPTHER

## 2017-11-02 NOTE — PROGRESS NOTES
CC: Ms. Swetha Thompson arrives today for management of Type 2 DM and review of chronic medical conditions, as listed in the visit diagnosis section of this encounter.     HPI: Ms. Swetha Thompson was diagnosed with Type 2 DM in her 50s. Initial treatment consisted of oral medications and insulin was added in ~2011. + FH of DM in mother, brother, sister, maternal GM. Denies hospitalizations due to DM.      Last seen by me in July, doses were not changed.     BG readings are checked 2x/day. Variable glucoses, mostly in 200-300s range.             Hypoglycemia: No    Missing Insulin/PO medication doses: yes  Timing prandial insulin 5-15 minutes before meals: yes    Exercise: Walks 1 mile every day. She will take care of her 4 yr old grandson.    Dietary Habits: Eats 2 meals daily. Sometimes will have Glucerna for a meal. Snacks on cookies after dinner. Drinks coke, apple juice.    Last DM education appointment: 5/2017    States that she did not take her blood pressure medication this morning.    CURRENT DIABETIC MEDS: Levemir 45 units am and with lunch if BG is >300, Novolog 10 units AC, Novolog 5 units with a snack, Metformin XR 1000 mg lunch, 500 mg dinner.    * Pt self adjusting insulin.  Vial or pen: pen  Glucometer type: unsure    Previous DM treatments:    Last Eye Exam: 2/2017, no DR  Last Podiatry Exam: 2017    REVIEW OF SYSTEMS  Constitutional: no c/o fatigue, weakness, or weight loss. + 5# weight gain.   HEENT: + dry mouth, denies visual disturbances.  Cardiac: no palpitations or chest pain.  Respiratory: no dyspnea. + intermittent dry cough.   GI: no c/o abdominal pain, nausea or diarrhea. Denies h/o pancreatitis.   Skin: no lesions or rashes.  Neuro: no numbness, tingling, or parasthesia in lower extremities. + intermittent numbness in R hand   Endocrine: denies polyphagia, polydipsia, polyuria    Personally reviewed Past Medical, Surgical, Social History.    Vital Signs  BP (!) 150/80   Pulse 91    "Ht 5' 2" (1.575 m)   Wt 82.5 kg (181 lb 14.1 oz)   BMI 33.27 kg/m²     Personally reviewed the below labs:    Hemoglobin A1C   Date Value Ref Range Status   10/31/2017 11.0 (H) 4.0 - 5.6 % Final     Comment:     According to ADA guidelines, hemoglobin A1c <7.0% represents  optimal control in non-pregnant diabetic patients. Different  metrics may apply to specific patient populations.   Standards of Medical Care in Diabetes-2016.  For the purpose of screening for the presence of diabetes:  <5.7%     Consistent with the absence of diabetes  5.7-6.4%  Consistent with increasing risk for diabetes   (prediabetes)  >or=6.5%  Consistent with diabetes  Currently, no consensus exists for use of hemoglobin A1c  for diagnosis of diabetes for children.  This Hemoglobin A1c assay has significant interference with fetal   hemoglobin   (HbF). The results are invalid for patients with abnormal amounts of   HbF,   including those with known Hereditary Persistence   of Fetal Hemoglobin. Heterozygous hemoglobin variants (HbAS, HbAC,   HbAD, HbAE, HbA2) do not significantly interfere with this assay;   however, presence of multiple variants in a sample may impact the %   interference.     07/20/2017 8.8 (H) 4.0 - 5.6 % Final     Comment:     According to ADA guidelines, hemoglobin A1c <7.0% represents  optimal control in non-pregnant diabetic patients. Different  metrics may apply to specific patient populations.   Standards of Medical Care in Diabetes-2016.  For the purpose of screening for the presence of diabetes:  <5.7%     Consistent with the absence of diabetes  5.7-6.4%  Consistent with increasing risk for diabetes   (prediabetes)  >or=6.5%  Consistent with diabetes  Currently, no consensus exists for use of hemoglobin A1c  for diagnosis of diabetes for children.  This Hemoglobin A1c assay has significant interference with fetal   hemoglobin   (HbF). The results are invalid for patients with abnormal amounts of   HbF, "   including those with known Hereditary Persistence   of Fetal Hemoglobin. Heterozygous hemoglobin variants (HbAS, HbAC,   HbAD, HbAE, HbA2) do not significantly interfere with this assay;   however, presence of multiple variants in a sample may impact the %   interference.     04/27/2017 11.9 (H) 4.5 - 6.2 % Final     Comment:     According to ADA guidelines, hemoglobin A1C <7.0% represents  optimal control in non-pregnant diabetic patients.  Different  metrics may apply to specific populations.   Standards of Medical Care in Diabetes - 2016.  For the purpose of screening for the presence of diabetes:  <5.7%     Consistent with the absence of diabetes  5.7-6.4%  Consistent with increasing risk for diabetes   (prediabetes)  >or=6.5%  Consistent with diabetes  Currently no consensus exists for use of hemoglobin A1C  for diagnosis of diabetes for children.         Chemistry        Component Value Date/Time     10/31/2017 0743    K 3.3 (L) 10/31/2017 0743     10/31/2017 0743    CO2 28 10/31/2017 0743    BUN 10 10/31/2017 0743    CREATININE 1.0 10/31/2017 0743     (H) 10/31/2017 0743        Component Value Date/Time    CALCIUM 8.9 10/31/2017 0743    ALKPHOS 73 10/31/2017 0743    AST 23 10/31/2017 0743    ALT 19 10/31/2017 0743    BILITOT 0.4 10/31/2017 0743    ESTGFRAFRICA >60.0 10/31/2017 0743    EGFRNONAA 56.0 (A) 10/31/2017 0743          Lab Results   Component Value Date    CHOL 179 10/31/2017    CHOL 172 07/20/2017    CHOL 167 10/01/2016     Lab Results   Component Value Date    HDL 59 10/31/2017    HDL 59 07/20/2017    HDL 77 (H) 10/01/2016     Lab Results   Component Value Date    LDLCALC 103.0 10/31/2017    LDLCALC 97.4 07/20/2017    LDLCALC 76.8 10/01/2016     Lab Results   Component Value Date    TRIG 85 10/31/2017    TRIG 78 07/20/2017    TRIG 66 10/01/2016     Lab Results   Component Value Date    CHOLHDL 33.0 10/31/2017    CHOLHDL 34.3 07/20/2017    CHOLHDL 46.1 10/01/2016       Lab  Results   Component Value Date    MICALBCREAT 61.9 (H) 04/27/2017     Lab Results   Component Value Date    TSH 3.485 07/20/2017     CrCl cannot be calculated (Unknown ideal weight.).    No results found for: [id number]    PHYSICAL EXAMINATION  Constitutional: Appears well, NAD.  Neck: Supple, trachea midline; no thyromegaly or nodules.   Respiratory: CTAB, even and unlabored.  Cardiovascular: RRR, no murmurs, no carotid bruits. DP pulses  2+ bilaterally; no edema.    Lymph: no cervical or supraclavicular lymphadenopathy  Skin: warm and dry; no lipohypertrophy, or acanthosis nigracans observed.  Neuro: DTR 2+ BUE  Feet: appropriate footwear.    Assessment/Plan  1. Type 2 diabetes mellitus with microalbuminuria, with long-term current use of insulin  -- A1c has increased. She is taking additional Levemir doses during the day. Not always taking Novolog appropriately (not with all meals but with snacks). + dietary indiscretion.   -- Continue metformin XR 1000 mg with breakfast. Increase metformin to 1000 mg with dinner.   -- Continue Levemir 45 units only in the morning. Advised to take only once daily.  -- Continue Novolog 10 units AC, 5 units if drinking glucerna. No Novolog with snacks. Correction scale will be too complex for pt.   -- Advised her to stop drinking juice and Coke. Limit portions of sweet snacks.  -- Pt declined diabetes education despite having explained its importance  -- check BG 4x/day. Notify me if BG remain > 200.    -- Discussed diagnosis of DM, A1c goals, progression of disease, long term complications and tx options.  Advised patient to check BG before activities, such as driving or exercise.  -- Reviewed hypoglycemia management: treat with 1/2 glass of juice, 1/2 can regular coke, or 4 glucose tablets. Monitor and repeat treatment every 15 minutes until BG is >70 Then have a snack, which includes a complex carbohydrate and protein.  -- takes ARB, statin     2. Hypertension, well controlled   -- elevated today because she did not take her medication. Advised her to take once home. Will monitor  -- on ARB   3. Obesity (BMI 30.0-34.9)  -- exercise decreases insulin resistance  Body mass index is 33.27 kg/m².          FOLLOW UP  Return in about 3 months (around 2/2/2018).   Patient instructed to bring BG logs to each follow up   Patient encouraged to call for any BG/medication issues, concerns, or questions.    Orders Placed This Encounter   Procedures    Basic metabolic panel    Hemoglobin A1c     Seen in conjunction with [email or web address] Nicholson PA-C

## 2017-12-27 RX ORDER — SIMVASTATIN 10 MG/1
10 TABLET, FILM COATED ORAL NIGHTLY
Qty: 30 TABLET | Refills: 3 | Status: SHIPPED | OUTPATIENT
Start: 2017-12-27 | End: 2018-04-30 | Stop reason: SDUPTHER

## 2018-01-02 RX ORDER — INSULIN ASPART 100 [IU]/ML
INJECTION, SOLUTION INTRAVENOUS; SUBCUTANEOUS
Qty: 15 SYRINGE | Refills: 3 | Status: SHIPPED | OUTPATIENT
Start: 2018-01-02 | End: 2018-05-01 | Stop reason: SDUPTHER

## 2018-01-31 ENCOUNTER — LAB VISIT (OUTPATIENT)
Dept: LAB | Facility: HOSPITAL | Age: 73
End: 2018-01-31
Attending: NURSE PRACTITIONER
Payer: MEDICARE

## 2018-01-31 DIAGNOSIS — Z79.4 TYPE 2 DIABETES MELLITUS WITH MICROALBUMINURIA, WITH LONG-TERM CURRENT USE OF INSULIN: ICD-10-CM

## 2018-01-31 DIAGNOSIS — E11.29 TYPE 2 DIABETES MELLITUS WITH MICROALBUMINURIA, WITH LONG-TERM CURRENT USE OF INSULIN: ICD-10-CM

## 2018-01-31 DIAGNOSIS — R80.9 TYPE 2 DIABETES MELLITUS WITH MICROALBUMINURIA, WITH LONG-TERM CURRENT USE OF INSULIN: ICD-10-CM

## 2018-01-31 LAB
ANION GAP SERPL CALC-SCNC: 10 MMOL/L
BUN SERPL-MCNC: 13 MG/DL
CALCIUM SERPL-MCNC: 9 MG/DL
CHLORIDE SERPL-SCNC: 105 MMOL/L
CO2 SERPL-SCNC: 25 MMOL/L
CREAT SERPL-MCNC: 0.9 MG/DL
EST. GFR  (AFRICAN AMERICAN): >60 ML/MIN/1.73 M^2
EST. GFR  (NON AFRICAN AMERICAN): >60 ML/MIN/1.73 M^2
ESTIMATED AVG GLUCOSE: 217 MG/DL
GLUCOSE SERPL-MCNC: 217 MG/DL
HBA1C MFR BLD HPLC: 9.2 %
POTASSIUM SERPL-SCNC: 3.8 MMOL/L
SODIUM SERPL-SCNC: 140 MMOL/L

## 2018-01-31 PROCEDURE — 80048 BASIC METABOLIC PNL TOTAL CA: CPT

## 2018-01-31 PROCEDURE — 83036 HEMOGLOBIN GLYCOSYLATED A1C: CPT

## 2018-01-31 PROCEDURE — 36415 COLL VENOUS BLD VENIPUNCTURE: CPT | Mod: PO

## 2018-02-08 ENCOUNTER — OFFICE VISIT (OUTPATIENT)
Dept: ENDOCRINOLOGY | Facility: CLINIC | Age: 73
End: 2018-02-08
Payer: MEDICARE

## 2018-02-08 VITALS
SYSTOLIC BLOOD PRESSURE: 138 MMHG | HEART RATE: 90 BPM | BODY MASS INDEX: 32.39 KG/M2 | WEIGHT: 176 LBS | HEIGHT: 62 IN | DIASTOLIC BLOOD PRESSURE: 80 MMHG

## 2018-02-08 DIAGNOSIS — I10 HYPERTENSION, WELL CONTROLLED: ICD-10-CM

## 2018-02-08 DIAGNOSIS — E66.9 OBESITY (BMI 30.0-34.9): ICD-10-CM

## 2018-02-08 DIAGNOSIS — R80.9 TYPE 2 DIABETES MELLITUS WITH MICROALBUMINURIA, WITH LONG-TERM CURRENT USE OF INSULIN: Primary | ICD-10-CM

## 2018-02-08 DIAGNOSIS — Z79.4 TYPE 2 DIABETES MELLITUS WITH MICROALBUMINURIA, WITH LONG-TERM CURRENT USE OF INSULIN: Primary | ICD-10-CM

## 2018-02-08 DIAGNOSIS — E11.29 TYPE 2 DIABETES MELLITUS WITH MICROALBUMINURIA, WITH LONG-TERM CURRENT USE OF INSULIN: Primary | ICD-10-CM

## 2018-02-08 PROCEDURE — 99215 OFFICE O/P EST HI 40 MIN: CPT | Mod: PO | Performed by: NURSE PRACTITIONER

## 2018-02-08 PROCEDURE — 3008F BODY MASS INDEX DOCD: CPT | Mod: S$GLB,,, | Performed by: NURSE PRACTITIONER

## 2018-02-08 PROCEDURE — 99999 PR PBB SHADOW E&M-EST. PATIENT-LVL V: CPT | Mod: PBBFAC,,, | Performed by: NURSE PRACTITIONER

## 2018-02-08 PROCEDURE — 99214 OFFICE O/P EST MOD 30 MIN: CPT | Mod: S$GLB,,, | Performed by: NURSE PRACTITIONER

## 2018-02-08 PROCEDURE — 1125F AMNT PAIN NOTED PAIN PRSNT: CPT | Mod: S$GLB,,, | Performed by: NURSE PRACTITIONER

## 2018-02-08 PROCEDURE — 1159F MED LIST DOCD IN RCRD: CPT | Mod: S$GLB,,, | Performed by: NURSE PRACTITIONER

## 2018-02-08 RX ORDER — INSULIN DEGLUDEC 100 U/ML
50 INJECTION, SOLUTION SUBCUTANEOUS DAILY
Qty: 45 ML | Refills: 3 | Status: SHIPPED | OUTPATIENT
Start: 2018-02-08 | End: 2018-05-17 | Stop reason: SDUPTHER

## 2018-02-08 NOTE — PROGRESS NOTES
CC: Ms. Swetha Thompson arrives today for management of Type 2 DM and review of chronic medical conditions, as listed in the visit diagnosis section of this encounter.     HPI: Ms. Swetha Thompson was diagnosed with Type 2 DM in her 50s. Initial treatment consisted of oral medications and insulin was added in ~2011. + FH of DM in mother, brother, sister, maternal GM. Denies hospitalizations due to DM.      At last visit, she was taking extra doses of Levemir and was advised to not do so. Metformin dose was increased. However, she is is taking a total of 1500 mg daily.       Her PCP recently added Actos.     BG readings are checked 2-3x/day.                Hypoglycemia: No    Missing Insulin/PO medication doses: yes  Timing prandial insulin 5-15 minutes before meals: yes    Exercise: Not recently. Had pain stimulator placed this week.     Dietary Habits: Eats 2 meals daily. Has breakfast of toast only and doesn't take Novolog.  Sometimes will have Glucerna for a meal (takes 5 units and denies hypoglycemia after). Has cut out cookies, Coke, apple juice.    Last DM education appointment: 5/2017    CURRENT DIABETIC MEDS: Metformin  mg QAM, 1000 mg dinner, Actos 15 mg daily,  Levemir 45 units QAM, Novolog 10 units AC, Novolog 5 units with Glucerna.   Vial or pen: pen  Glucometer type: unsure        Last Eye Exam: 2/2017, no . She plans to reschedule.   Last Podiatry Exam: 2017    REVIEW OF SYSTEMS  Constitutional: no c/o fatigue, weakness, or weight loss. + 5# weight loss  Eyes: denies visual disturbances.  Cardiac: no palpitations or chest pain.  Respiratory: no dyspnea, cough.   GI: no c/o abdominal pain, nausea. Denies h/o pancreatitis.   Skin: no lesions or rashes.  Musculoskeletal: + neck pain that extends to lower back pain  Neuro: no numbness, tingling, or parasthesia.  Endocrine: denies polyphagia, polydipsia, polyuria    Personally reviewed Past Medical, Surgical, Social History.    Vital Signs  BP  "138/80   Pulse 90   Ht 5' 2" (1.575 m)   Wt 79.8 kg (176 lb)   BMI 32.19 kg/m²     Personally reviewed the below labs:    Hemoglobin A1C   Date Value Ref Range Status   01/31/2018 9.2 (H) 4.0 - 5.6 % Final     Comment:     According to ADA guidelines, hemoglobin A1c <7.0% represents  optimal control in non-pregnant diabetic patients. Different  metrics may apply to specific patient populations.   Standards of Medical Care in Diabetes-2016.  For the purpose of screening for the presence of diabetes:  <5.7%     Consistent with the absence of diabetes  5.7-6.4%  Consistent with increasing risk for diabetes   (prediabetes)  >or=6.5%  Consistent with diabetes  Currently, no consensus exists for use of hemoglobin A1c  for diagnosis of diabetes for children.  This Hemoglobin A1c assay has significant interference with fetal   hemoglobin   (HbF). The results are invalid for patients with abnormal amounts of   HbF,   including those with known Hereditary Persistence   of Fetal Hemoglobin. Heterozygous hemoglobin variants (HbAS, HbAC,   HbAD, HbAE, HbA2) do not significantly interfere with this assay;   however, presence of multiple variants in a sample may impact the %   interference.     10/31/2017 11.0 (H) 4.0 - 5.6 % Final     Comment:     According to ADA guidelines, hemoglobin A1c <7.0% represents  optimal control in non-pregnant diabetic patients. Different  metrics may apply to specific patient populations.   Standards of Medical Care in Diabetes-2016.  For the purpose of screening for the presence of diabetes:  <5.7%     Consistent with the absence of diabetes  5.7-6.4%  Consistent with increasing risk for diabetes   (prediabetes)  >or=6.5%  Consistent with diabetes  Currently, no consensus exists for use of hemoglobin A1c  for diagnosis of diabetes for children.  This Hemoglobin A1c assay has significant interference with fetal   hemoglobin   (HbF). The results are invalid for patients with abnormal amounts of "   HbF,   including those with known Hereditary Persistence   of Fetal Hemoglobin. Heterozygous hemoglobin variants (HbAS, HbAC,   HbAD, HbAE, HbA2) do not significantly interfere with this assay;   however, presence of multiple variants in a sample may impact the %   interference.     07/20/2017 8.8 (H) 4.0 - 5.6 % Final     Comment:     According to ADA guidelines, hemoglobin A1c <7.0% represents  optimal control in non-pregnant diabetic patients. Different  metrics may apply to specific patient populations.   Standards of Medical Care in Diabetes-2016.  For the purpose of screening for the presence of diabetes:  <5.7%     Consistent with the absence of diabetes  5.7-6.4%  Consistent with increasing risk for diabetes   (prediabetes)  >or=6.5%  Consistent with diabetes  Currently, no consensus exists for use of hemoglobin A1c  for diagnosis of diabetes for children.  This Hemoglobin A1c assay has significant interference with fetal   hemoglobin   (HbF). The results are invalid for patients with abnormal amounts of   HbF,   including those with known Hereditary Persistence   of Fetal Hemoglobin. Heterozygous hemoglobin variants (HbAS, HbAC,   HbAD, HbAE, HbA2) do not significantly interfere with this assay;   however, presence of multiple variants in a sample may impact the %   interference.         Chemistry        Component Value Date/Time     01/31/2018 1000    K 3.8 01/31/2018 1000     01/31/2018 1000    CO2 25 01/31/2018 1000    BUN 13 01/31/2018 1000    CREATININE 0.9 01/31/2018 1000     (H) 01/31/2018 1000        Component Value Date/Time    CALCIUM 9.0 01/31/2018 1000    ALKPHOS 73 10/31/2017 0743    AST 23 10/31/2017 0743    ALT 19 10/31/2017 0743    BILITOT 0.4 10/31/2017 0743    ESTGFRAFRICA >60.0 01/31/2018 1000    EGFRNONAA >60.0 01/31/2018 1000          Lab Results   Component Value Date    CHOL 179 10/31/2017    CHOL 172 07/20/2017    CHOL 167 10/01/2016     Lab Results   Component  Value Date    HDL 59 10/31/2017    HDL 59 07/20/2017    HDL 77 (H) 10/01/2016     Lab Results   Component Value Date    LDLCALC 103.0 10/31/2017    LDLCALC 97.4 07/20/2017    LDLCALC 76.8 10/01/2016     Lab Results   Component Value Date    TRIG 85 10/31/2017    TRIG 78 07/20/2017    TRIG 66 10/01/2016     Lab Results   Component Value Date    CHOLHDL 33.0 10/31/2017    CHOLHDL 34.3 07/20/2017    CHOLHDL 46.1 10/01/2016       Lab Results   Component Value Date    MICALBCREAT 61.9 (H) 04/27/2017     Lab Results   Component Value Date    TSH 3.485 07/20/2017     CrCl cannot be calculated (Patient's most recent lab result is older than the maximum 7 days allowed.).    No results found for: [id number]    PHYSICAL EXAMINATION  Constitutional: Appears well, NAD.  Neck: Supple, trachea midline; no thyromegaly or nodules.   Respiratory: CTA, even and unlabored.  Cardiovascular: RRR, no murmurs, no carotid bruits. DP pulses  2+ bilaterally; no edema.    Lymph: no cervical or supraclavicular lymphadenopathy  Skin: warm and dry; no lipohypertrophy, or acanthosis nigracans observed.  Neuro: DTR 2+ BUE/2+ BLE  Feet: appropriate footwear.    Assessment/Plan  1. Type 2 diabetes mellitus with microalbuminuria, with long-term current use of insulin  -- A1c has decreased but remains above goal. I anticipate that Actos will help.   -- increase metformin XR to 1000 mg BID  -- Discontinue Levemir and begin Tresiba 50 units daily.  -- Continue Novolog 10 units with all meals, 5 units if drinking glucerna.   -- recommended she add a protein to breakfast (toast)  -- check BG 4x/day. Notify me if BG remain > 200 or if having hypoglycemia.    -- Discussed diagnosis of DM, A1c goals, progression of disease, long term complications and tx options.  Advised patient to check BG before activities, such as driving or exercise.  -- Reviewed hypoglycemia management: treat with 1/2 glass of juice, 1/2 can regular coke, or 4 glucose tablets. Monitor  and repeat treatment every 15 minutes until BG is >70 Then have a snack, which includes a complex carbohydrate and protein.  -- takes ARB, statin     2. Hypertension, well controlled  -- controlled  -- on ARB   3. Obesity (BMI 30.0-34.9)  -- exercise decreases insulin resistance  Body mass index is 32.19 kg/m².          FOLLOW UP  Follow-up in about 3 months (around 5/8/2018).   Patient instructed to bring BG logs to each follow up   Patient encouraged to call for any BG/medication issues, concerns, or questions.    Orders Placed This Encounter   Procedures    Hemoglobin A1c    Comprehensive metabolic panel    Microalbumin/creatinine urine ratio

## 2018-02-08 NOTE — PATIENT INSTRUCTIONS
Hypoglycemia (Low Blood Sugar)     Fast-acting sugar includes a cup of nonfat milk.     Too little sugar (glucose) in your blood is called hypoglycemia or low blood sugar. Low blood sugar usually means anything lower than 70 mg/dL. Talk with your healthcare provider about your target range and what level is too low for you. Diabetes itself doesnt cause low blood sugar. But some of the treatments for diabetes, such as pills or insulin, may raise your risk for it. Low blood sugar may cause you to pass out or have a seizure. So always treat low blood sugar right away, but don't overeat.  Special note: Always carry a source of fast-acting sugar and a snack in case of hypoglycemia.   What you may notice  If you have low blood sugar, you may have one or more of these symptoms:  · Shakiness or dizziness  · Cold, clammy skin or sweating  · Feelings of hunger  · Headache  · Nervousness  · A hard, fast heartbeat  · Weakness  · Confusion or irritability  · Blurred vision  · Having nightmares or waking up confused or sweating  · Numbness or tingling in the lips or tongue  What you should do  Here are tips to follow if you have hypoglycemia:   · First check your blood sugar. If it is too low (out of your target range), eat or drink 15 to 20 grams of fast-acting sugar. This may be 3 to 4 glucose tablets, 4 ounces (half a cup) of fruit juice or regular (nondiet) soda, 8 ounces (1 cup) of fat-free milk, or 1 tablespoon of honey. Dont take more than this, or your blood sugar may go too high.  · Wait 15 minutes. Then recheck your blood sugar if you can.  · If your blood sugar is still too low, repeat the steps above and check your blood sugar again. If your blood sugar still has not returned to your target range, contact your healthcare provider or seek emergency care.  · Once your blood sugar returns to target range, eat a snack or meal.  Preventing low blood sugar  Things you can do include the following:   · If your condition  needs a strict treatment plan, eat your meals and snacks at the same times each day. Dont skip meals!  · If your treatment plan lets you change when you eat and what you eat, learn how to change the time and dose of your rapid-acting insulin to match this.   · Ask your healthcare provider if it is safe for you to drink alcohol. Never drink on an empty stomach.  · Take your medicine at the prescribed times.  · Always carry a source of fast-acting sugar and a snack when youre away from home.  Other things to do  Additional tips include the following:  · Carry a medical ID card, a compact USB drive, or wear a medical alert bracelet or necklace. It should say that you have diabetes. It should also say what to do if you pass out or have a seizure.  · Make sure your family, friends, and coworkers know the signs of low blood sugar. Tell them what to do if your blood sugar falls very low and you cant treat yourself.  · Keep a glucagon emergency kit handy. Be sure your family, friends, and coworkers know how and when to use it. Check it regularly and replace the glucagon before it expires.  · Talk with your health care team about other things you can do to prevent low blood sugar.     If you have unexplained hypoglycemia or hypoglycemia several times, call your healthcare provider.   Date Last Reviewed: 5/1/2016  © 2978-0172 [employer]. [address] [place] [address], [place] [address]. All rights reserved. This information is not intended as a substitute for professional medical care. Always follow your healthcare professional's instructions.

## 2018-02-15 ENCOUNTER — TELEPHONE (OUTPATIENT)
Dept: ENDOCRINOLOGY | Facility: CLINIC | Age: 73
End: 2018-02-15

## 2018-02-15 NOTE — TELEPHONE ENCOUNTER
Fran peña/ home health called stating pt has been having BG in the 200's over the past few days.  She will fax log to [facility] for Ms. Mae to review tomorrow morning after seeing pt.

## 2018-02-15 NOTE — TELEPHONE ENCOUNTER
----- Message from Lyndsey Fontanez sent at 2/15/2018 12:29 PM CST -----  [facility] regarding questions about patient blood sugar please call back at [phone]

## 2018-04-19 PROBLEM — E11.65 UNCONTROLLED TYPE 2 DIABETES MELLITUS WITH HYPERGLYCEMIA, WITH LONG-TERM CURRENT USE OF INSULIN: Status: ACTIVE | Noted: 2018-04-19

## 2018-04-19 PROBLEM — Z79.4 UNCONTROLLED TYPE 2 DIABETES MELLITUS WITH HYPERGLYCEMIA, WITH LONG-TERM CURRENT USE OF INSULIN: Status: ACTIVE | Noted: 2018-04-19

## 2018-04-30 RX ORDER — SIMVASTATIN 10 MG/1
10 TABLET, FILM COATED ORAL NIGHTLY
Qty: 30 TABLET | Refills: 6 | Status: SHIPPED | OUTPATIENT
Start: 2018-04-30 | End: 2019-04-30

## 2018-05-01 RX ORDER — INSULIN ASPART 100 [IU]/ML
INJECTION, SOLUTION INTRAVENOUS; SUBCUTANEOUS
Qty: 15 SYRINGE | Refills: 3 | Status: SHIPPED | OUTPATIENT
Start: 2018-05-01 | End: 2018-06-26 | Stop reason: ALTCHOICE

## 2018-05-08 ENCOUNTER — LAB VISIT (OUTPATIENT)
Dept: LAB | Facility: HOSPITAL | Age: 73
End: 2018-05-08
Attending: INTERNAL MEDICINE
Payer: MEDICARE

## 2018-05-08 DIAGNOSIS — Z79.4 TYPE 2 DIABETES MELLITUS WITH MICROALBUMINURIA, WITH LONG-TERM CURRENT USE OF INSULIN: ICD-10-CM

## 2018-05-08 DIAGNOSIS — R80.9 TYPE 2 DIABETES MELLITUS WITH MICROALBUMINURIA, WITH LONG-TERM CURRENT USE OF INSULIN: ICD-10-CM

## 2018-05-08 DIAGNOSIS — E11.29 TYPE 2 DIABETES MELLITUS WITH MICROALBUMINURIA, WITH LONG-TERM CURRENT USE OF INSULIN: ICD-10-CM

## 2018-05-08 LAB
ALBUMIN SERPL BCP-MCNC: 3.3 G/DL
ALP SERPL-CCNC: 63 U/L
ALT SERPL W/O P-5'-P-CCNC: 13 U/L
ANION GAP SERPL CALC-SCNC: 6 MMOL/L
AST SERPL-CCNC: 15 U/L
BILIRUB SERPL-MCNC: 0.4 MG/DL
BUN SERPL-MCNC: 20 MG/DL
CALCIUM SERPL-MCNC: 8.7 MG/DL
CHLORIDE SERPL-SCNC: 102 MMOL/L
CO2 SERPL-SCNC: 28 MMOL/L
CREAT SERPL-MCNC: 1.2 MG/DL
EST. GFR  (AFRICAN AMERICAN): 51.4 ML/MIN/1.73 M^2
EST. GFR  (NON AFRICAN AMERICAN): 44.6 ML/MIN/1.73 M^2
ESTIMATED AVG GLUCOSE: 186 MG/DL
GLUCOSE SERPL-MCNC: 159 MG/DL
HBA1C MFR BLD HPLC: 8.1 %
POTASSIUM SERPL-SCNC: 3.7 MMOL/L
PROT SERPL-MCNC: 7.1 G/DL
SODIUM SERPL-SCNC: 136 MMOL/L

## 2018-05-08 PROCEDURE — 36415 COLL VENOUS BLD VENIPUNCTURE: CPT | Mod: PO

## 2018-05-08 PROCEDURE — 80053 COMPREHEN METABOLIC PANEL: CPT

## 2018-05-08 PROCEDURE — 83036 HEMOGLOBIN GLYCOSYLATED A1C: CPT

## 2018-05-17 ENCOUNTER — OFFICE VISIT (OUTPATIENT)
Dept: ENDOCRINOLOGY | Facility: CLINIC | Age: 73
End: 2018-05-17
Payer: MEDICARE

## 2018-05-17 VITALS
DIASTOLIC BLOOD PRESSURE: 62 MMHG | HEIGHT: 62 IN | SYSTOLIC BLOOD PRESSURE: 160 MMHG | WEIGHT: 179 LBS | BODY MASS INDEX: 32.94 KG/M2 | HEART RATE: 91 BPM | RESPIRATION RATE: 20 BRPM

## 2018-05-17 DIAGNOSIS — E11.29 TYPE 2 DIABETES MELLITUS WITH MICROALBUMINURIA, WITH LONG-TERM CURRENT USE OF INSULIN: Primary | ICD-10-CM

## 2018-05-17 DIAGNOSIS — I10 HYPERTENSION, WELL CONTROLLED: ICD-10-CM

## 2018-05-17 DIAGNOSIS — E66.9 OBESITY (BMI 30.0-34.9): ICD-10-CM

## 2018-05-17 DIAGNOSIS — R80.9 TYPE 2 DIABETES MELLITUS WITH MICROALBUMINURIA, WITH LONG-TERM CURRENT USE OF INSULIN: Primary | ICD-10-CM

## 2018-05-17 DIAGNOSIS — Z79.4 TYPE 2 DIABETES MELLITUS WITH MICROALBUMINURIA, WITH LONG-TERM CURRENT USE OF INSULIN: Primary | ICD-10-CM

## 2018-05-17 DIAGNOSIS — E11.649 HYPOGLYCEMIA DUE TO TYPE 2 DIABETES MELLITUS: ICD-10-CM

## 2018-05-17 LAB — GLUCOSE SERPL-MCNC: 95 MG/DL (ref 70–110)

## 2018-05-17 PROCEDURE — 3077F SYST BP >= 140 MM HG: CPT | Mod: CPTII,S$GLB,, | Performed by: NURSE PRACTITIONER

## 2018-05-17 PROCEDURE — 99214 OFFICE O/P EST MOD 30 MIN: CPT | Mod: S$GLB,,, | Performed by: NURSE PRACTITIONER

## 2018-05-17 PROCEDURE — 99999 PR PBB SHADOW E&M-EST. PATIENT-LVL V: CPT | Mod: PBBFAC,,, | Performed by: NURSE PRACTITIONER

## 2018-05-17 PROCEDURE — 82948 REAGENT STRIP/BLOOD GLUCOSE: CPT | Mod: S$GLB,,, | Performed by: NURSE PRACTITIONER

## 2018-05-17 PROCEDURE — 3045F PR MOST RECENT HEMOGLOBIN A1C LEVEL 7.0-9.0%: CPT | Mod: CPTII,S$GLB,, | Performed by: NURSE PRACTITIONER

## 2018-05-17 PROCEDURE — 3078F DIAST BP <80 MM HG: CPT | Mod: CPTII,S$GLB,, | Performed by: NURSE PRACTITIONER

## 2018-05-17 RX ORDER — INSULIN DEGLUDEC 100 U/ML
38 INJECTION, SOLUTION SUBCUTANEOUS DAILY
Qty: 45 ML | Refills: 3
Start: 2018-05-17 | End: 2018-06-26

## 2018-05-17 NOTE — PROGRESS NOTES
"CC: Ms. Swetha Thompson arrives today for management of Type 2 DM and review of chronic medical conditions, as listed in the visit diagnosis section of this encounter.     HPI: Ms. Swetha Thompson was diagnosed with Type 2 DM in her 50s. Initial treatment consisted of oral medications and insulin was added in ~2011. Actos was added by PCP in 2018. + FH of DM in mother, brother, sister, maternal GM. Denies hospitalizations due to DM.      At last visit, Levemir was changed to Tresiba.    BG readings are checked 2-3x/day.              Hypoglycemia: Yes, occurring in AM and during day.   Symptoms: confusion, dizziness, nausea  Treatment: juice    Missing Insulin/PO medication doses: yes  Timing prandial insulin 5-15 minutes before meals: yes    Exercise: No     Dietary Habits: Eats 3 meals daily. Sometimes will have Glucerna for a meal. Rare snacking. Drinks mostly water.     Last DM education appointment: 5/2017    Patient lives alone but her children check on her often.       CURRENT DIABETIC MEDS: Metformin XR 1000 mg BID, Actos 15 mg daily, Tresiba 50 units QAM, Novolog 10 units AC (Novolog 5 units with Glucerna).   Vial or pen: pen  Glucometer type: unsure    Previous DM meds:  Levemir    Last Eye Exam: 2/2017, no  Needs to reschedule.   Last Podiatry Exam: 2017    REVIEW OF SYSTEMS  Constitutional: no c/o fatigue, weakness, or weight loss.   Eyes: denies visual disturbances.  Cardiac: no palpitations or chest pain.  Respiratory: no dyspnea, + dry cough that comes and goes  GI: no c/o abdominal pain, nausea. Denies h/o pancreatitis.   Skin: no lesions or rashes.  Neuro: no numbness, tingling, or parasthesia.  Endocrine: denies polyphagia, polydipsia, polyuria    Personally reviewed Past Medical, Surgical, Social History.    Vital Signs  BP (!) 160/62   Pulse 91   Resp 20   Ht 5' 2" (1.575 m)   Wt 81.2 kg (179 lb 0.2 oz)   BMI 32.74 kg/m²     Personally reviewed the below labs:    Hemoglobin A1C "   Date Value Ref Range Status   05/08/2018 8.1 (H) 4.0 - 5.6 % Final     Comment:     According to ADA guidelines, hemoglobin A1c <7.0% represents  optimal control in non-pregnant diabetic patients. Different  metrics may apply to specific patient populations.   Standards of Medical Care in Diabetes-2016.  For the purpose of screening for the presence of diabetes:  <5.7%     Consistent with the absence of diabetes  5.7-6.4%  Consistent with increasing risk for diabetes   (prediabetes)  >or=6.5%  Consistent with diabetes  Currently, no consensus exists for use of hemoglobin A1c  for diagnosis of diabetes for children.  This Hemoglobin A1c assay has significant interference with fetal   hemoglobin   (HbF). The results are invalid for patients with abnormal amounts of   HbF,   including those with known Hereditary Persistence   of Fetal Hemoglobin. Heterozygous hemoglobin variants (HbAS, HbAC,   HbAD, HbAE, HbA2) do not significantly interfere with this assay;   however, presence of multiple variants in a sample may impact the %   interference.     01/31/2018 9.2 (H) 4.0 - 5.6 % Final     Comment:     According to ADA guidelines, hemoglobin A1c <7.0% represents  optimal control in non-pregnant diabetic patients. Different  metrics may apply to specific patient populations.   Standards of Medical Care in Diabetes-2016.  For the purpose of screening for the presence of diabetes:  <5.7%     Consistent with the absence of diabetes  5.7-6.4%  Consistent with increasing risk for diabetes   (prediabetes)  >or=6.5%  Consistent with diabetes  Currently, no consensus exists for use of hemoglobin A1c  for diagnosis of diabetes for children.  This Hemoglobin A1c assay has significant interference with fetal   hemoglobin   (HbF). The results are invalid for patients with abnormal amounts of   HbF,   including those with known Hereditary Persistence   of Fetal Hemoglobin. Heterozygous hemoglobin variants (HbAS, HbAC,   HbAD, HbAE,  HbA2) do not significantly interfere with this assay;   however, presence of multiple variants in a sample may impact the %   interference.     10/31/2017 11.0 (H) 4.0 - 5.6 % Final     Comment:     According to ADA guidelines, hemoglobin A1c <7.0% represents  optimal control in non-pregnant diabetic patients. Different  metrics may apply to specific patient populations.   Standards of Medical Care in Diabetes-2016.  For the purpose of screening for the presence of diabetes:  <5.7%     Consistent with the absence of diabetes  5.7-6.4%  Consistent with increasing risk for diabetes   (prediabetes)  >or=6.5%  Consistent with diabetes  Currently, no consensus exists for use of hemoglobin A1c  for diagnosis of diabetes for children.  This Hemoglobin A1c assay has significant interference with fetal   hemoglobin   (HbF). The results are invalid for patients with abnormal amounts of   HbF,   including those with known Hereditary Persistence   of Fetal Hemoglobin. Heterozygous hemoglobin variants (HbAS, HbAC,   HbAD, HbAE, HbA2) do not significantly interfere with this assay;   however, presence of multiple variants in a sample may impact the %   interference.         Chemistry        Component Value Date/Time     05/08/2018 0712    K 3.7 05/08/2018 0712     05/08/2018 0712    CO2 28 05/08/2018 0712    BUN 20 05/08/2018 0712    CREATININE 1.2 05/08/2018 0712     (H) 05/08/2018 0712        Component Value Date/Time    CALCIUM 8.7 05/08/2018 0712    ALKPHOS 63 05/08/2018 0712    AST 15 05/08/2018 0712    ALT 13 05/08/2018 0712    BILITOT 0.4 05/08/2018 0712    ESTGFRAFRICA 51.4 (A) 05/08/2018 0712    EGFRNONAA 44.6 (A) 05/08/2018 0712          Lab Results   Component Value Date    CHOL 179 10/31/2017    CHOL 172 07/20/2017    CHOL 167 10/01/2016     Lab Results   Component Value Date    HDL 59 10/31/2017    HDL 59 07/20/2017    HDL 77 (H) 10/01/2016     Lab Results   Component Value Date    LDLCALC 103.0  10/31/2017    LDLCALC 97.4 07/20/2017    LDLCALC 76.8 10/01/2016     Lab Results   Component Value Date    TRIG 85 10/31/2017    TRIG 78 07/20/2017    TRIG 66 10/01/2016     Lab Results   Component Value Date    CHOLHDL 33.0 10/31/2017    CHOLHDL 34.3 07/20/2017    CHOLHDL 46.1 10/01/2016       Lab Results   Component Value Date    MICALBCREAT 16.5 05/08/2018     Lab Results   Component Value Date    TSH 3.485 07/20/2017     CrCl cannot be calculated (Patient's most recent lab result is older than the maximum 7 days allowed.).    No results found for: [id number]    PHYSICAL EXAMINATION  Constitutional: Appears well, NAD.  Neck: Supple, trachea midline; no thyromegaly or nodules.   Respiratory: CTA, even and unlabored.  Cardiovascular: RRR, no murmurs, no carotid bruits. DP pulses  2+ bilaterally; no edema.    Lymph: no cervical or supraclavicular lymphadenopathy  Skin: warm and dry; no lipohypertrophy, or acanthosis nigracans observed.  Neuro: DTR 2+ BUE/2+ BLE  Feet: appropriate footwear.    Assessment/Plan  1. Type 2 diabetes mellitus with microalbuminuria, with long-term current use of insulin  -- POCT glucose 95 mg/dL after drinking juice at home for a fasting glucose of 68 mg/dL. PB crackers provided during visit since she hadn't eaten breakfast.   -- Decrease Tresiba to 38 units daily.  -- Continue Novolog 10 units with meals, 5 units if drinking glucerna.   -- continue metformin and Actos  -- check BG 4x/day. Call if having hypoglycemia.    -- Discussed diagnosis of DM, A1c goals, progression of disease, long term complications and tx options.  Advised patient to check BG before activities, such as driving or exercise.  -- Reviewed hypoglycemia management: treat with 1/2 glass of juice, 1/2 can regular coke, or 4 glucose tablets. Monitor and repeat treatment every 15 minutes until BG is >70 Then have a snack, which includes a complex carbohydrate and protein.    -- takes ARB, statin   2. Hypertension, well  controlled  -- elevated today.   -- continue losartan and monitor   3. Obesity (BMI 30.0-34.9)  -- 3# weight gain since last visit.   Body mass index is 32.74 kg/m².      4. Hypoglycemia associated with Type 2 DM -- discussed treatment in detail.  -- advised pt to notify me if lows continue after Tresiba dose decrease today.        FOLLOW UP  Follow-up in about 3 months (around 8/17/2018).   Patient instructed to bring BG logs to each follow up   Patient encouraged to call for any BG/medication issues, concerns, or questions.    Orders Placed This Encounter   Procedures    Hemoglobin A1c    Basic metabolic panel    TSH    POCT glucose

## 2018-05-17 NOTE — PATIENT INSTRUCTIONS
Hypoglycemia (Low Blood Sugar)     Fast-acting sugar includes a cup of nonfat milk.     Too little sugar (glucose) in your blood is called hypoglycemia or low blood sugar. Low blood sugar usually means anything lower than 70 mg/dL. Talk with your healthcare provider about your target range and what level is too low for you. Diabetes itself doesnt cause low blood sugar. But some of the treatments for diabetes, such as pills or insulin, may raise your risk for it. Low blood sugar may cause you to pass out or have a seizure. So always treat low blood sugar right away, but don't overeat.  Special note: Always carry a source of fast-acting sugar and a snack in case of hypoglycemia.   What you may notice  If you have low blood sugar, you may have one or more of these symptoms:  · Shakiness or dizziness  · Cold, clammy skin or sweating  · Feelings of hunger  · Headache  · Nervousness  · A hard, fast heartbeat  · Weakness  · Confusion or irritability  · Blurred vision  · Having nightmares or waking up confused or sweating  · Numbness or tingling in the lips or tongue  What you should do  Here are tips to follow if you have hypoglycemia:   · First check your blood sugar. If it is too low (out of your target range), eat or drink 15 to 20 grams of fast-acting sugar. This may be 3 to 4 glucose tablets, 4 ounces (half a cup) of fruit juice or regular (nondiet) soda, 8 ounces (1 cup) of fat-free milk, or 1 tablespoon of honey. Dont take more than this, or your blood sugar may go too high.  · Wait 15 minutes. Then recheck your blood sugar if you can.  · If your blood sugar is still too low, repeat the steps above and check your blood sugar again. If your blood sugar still has not returned to your target range, contact your healthcare provider or seek emergency care.  · Once your blood sugar returns to target range, eat a snack or meal.  Preventing low blood sugar  Things you can do include the following:   · If your condition  needs a strict treatment plan, eat your meals and snacks at the same times each day. Dont skip meals!  · If your treatment plan lets you change when you eat and what you eat, learn how to change the time and dose of your rapid-acting insulin to match this.   · Ask your healthcare provider if it is safe for you to drink alcohol. Never drink on an empty stomach.  · Take your medicine at the prescribed times.  · Always carry a source of fast-acting sugar and a snack when youre away from home.  Other things to do  Additional tips include the following:  · Carry a medical ID card, a compact USB drive, or wear a medical alert bracelet or necklace. It should say that you have diabetes. It should also say what to do if you pass out or have a seizure.  · Make sure your family, friends, and coworkers know the signs of low blood sugar. Tell them what to do if your blood sugar falls very low and you cant treat yourself.  · Keep a glucagon emergency kit handy. Be sure your family, friends, and coworkers know how and when to use it. Check it regularly and replace the glucagon before it expires.  · Talk with your health care team about other things you can do to prevent low blood sugar.     If you have unexplained hypoglycemia or hypoglycemia several times, call your healthcare provider.   Date Last Reviewed: 5/1/2016  © 1974-5080 [employer]. [address] [place] [address], [place] [address]. All rights reserved. This information is not intended as a substitute for professional medical care. Always follow your healthcare professional's instructions.

## 2018-05-18 ENCOUNTER — NURSE TRIAGE (OUTPATIENT)
Dept: ADMINISTRATIVE | Facility: CLINIC | Age: 73
End: 2018-05-18

## 2018-05-18 NOTE — TELEPHONE ENCOUNTER
"Blood sugar was not checked again. Caller not with pt. Called house on 3-way call to have someone recheck CBG at this time. 80 mg/dl. Pt eating a bowl of cereal. Advised to give a glass of juice and hold metformin and insulin until contact with endo at 8 am.  Reason for Disposition   [1] Blood glucose < 70  mg/dl (3.9 mmol/l) or symptomatic with other adult present AND [2] cause unknown food, strenuous exercise.)    Answer Assessment - Initial Assessment Questions  1. SYMPTOMS: "What symptoms are you concerned about?"      Confusion and blood glucose 53  2. ONSET:  "When did the symptoms start?"      6 am  3. BLOOD GLUCOSE: "What is your blood glucose level?"       53  4. USUAL RANGE: "What is your blood glucose level usually?" (e.g., usual fasting morning value, usual evening value)      Usually over 100  5. TYPE 1 or 2:  "Do you know what type of diabetes you have?"  (e.g., Type 1, Type 2, Gestational; doesn't know)       Not sure  6. INSULIN: "Do you take insulin?"       Yes and was changed  7. DIABETES PILLS: "Do you take any pills for your diabetes?"      metformin  8. OTHER SYMPTOMS: "Do you have any symptoms?" (e.g., fever, frequent urination, difficulty breathing, vomiting)      no  9. LOW BLOOD GLUCOSE TREATMENT: "What have you done so far to treat the low blood glucose level?"      Glucose tabs  10. ALONE: "Are you alone right now or is someone with you?"         Caller is not with pt but someone is home with her  11. PREGNANCY: "Is there any chance you are pregnant?" "When was your last menstrual period?"        no    Protocols used: ST DIABETES - LOW BLOOD SUGAR-A-      "

## 2018-05-18 NOTE — TELEPHONE ENCOUNTER
Spoke with pt, states her blood sugar reading last night was 100. Advised per visit yesterday blood sugar may run low due to taking old dose of Tresiba, advise to hold Novolog this am and take 30 units of Tresiba today and for the remainder of the weekend, advised to increase tresiba to 38 if blood sugars get over 200  Pt voiced understanding

## 2018-06-26 ENCOUNTER — TELEPHONE (OUTPATIENT)
Dept: ENDOCRINOLOGY | Facility: CLINIC | Age: 73
End: 2018-06-26

## 2018-06-26 NOTE — TELEPHONE ENCOUNTER
Spoke with ER physician, Dr. Coronel. Patient had hypoglycemia this AM. Did not eat dinner yesterday. Was treated with honey, juice, possible glucose tabs and glucose increased, then became hypoglycemic again. EMS responded and gave amp D50 for glucose of 33 mg/dL.     She tells ER physician that hypoglycemia continued since last Tresiba dose decrease (from 55 u to 38 u). Takes Novolog sparingly and SSI too complicated for her.     Recommended decreasing Tresiba to 20 units upon ER discharge (between 0.4-0.5 u/kg). Hold Novolog for now. We will arrange for follow up within the week for log review.

## 2018-06-26 NOTE — TELEPHONE ENCOUNTER
Spoke with pt, states she is feeling better since er visit, advised to change dose of Tresiba to 20 units,pt states she will need a new rx for glucagon, educated pt on how to use it. Scheduled follow up per a jarrott request, pt aware of date time and location

## 2018-06-26 NOTE — TELEPHONE ENCOUNTER
Spoke with pt, advised to take bedtime reading and if less than 120 to eat a bedtime snack that contains 15g of carbs, example cheese and crackers, also advised pt to purchase gluocse gel OTC, pt will notify the office is levels drop before appt on 7/2.

## 2018-06-26 NOTE — TELEPHONE ENCOUNTER
----- Message from Aurelia Oakley sent at 6/26/2018  2:05 PM CDT -----  Contact: self  Patient [phone] is calling to say that the prescription sent to pharmacy is $300 and patient can not afford that/please call patient to discuss

## 2018-07-02 ENCOUNTER — OFFICE VISIT (OUTPATIENT)
Dept: ENDOCRINOLOGY | Facility: CLINIC | Age: 73
End: 2018-07-02
Payer: MEDICARE

## 2018-07-02 VITALS
DIASTOLIC BLOOD PRESSURE: 70 MMHG | HEIGHT: 62 IN | SYSTOLIC BLOOD PRESSURE: 158 MMHG | BODY MASS INDEX: 34.79 KG/M2 | HEART RATE: 89 BPM | WEIGHT: 189.06 LBS

## 2018-07-02 DIAGNOSIS — I10 ESSENTIAL HYPERTENSION: ICD-10-CM

## 2018-07-02 DIAGNOSIS — Z79.4 TYPE 2 DIABETES MELLITUS WITH MICROALBUMINURIA, WITH LONG-TERM CURRENT USE OF INSULIN: Primary | ICD-10-CM

## 2018-07-02 DIAGNOSIS — E11.649 HYPOGLYCEMIA DUE TO TYPE 2 DIABETES MELLITUS: ICD-10-CM

## 2018-07-02 DIAGNOSIS — R80.9 TYPE 2 DIABETES MELLITUS WITH MICROALBUMINURIA, WITH LONG-TERM CURRENT USE OF INSULIN: Primary | ICD-10-CM

## 2018-07-02 DIAGNOSIS — Z51.81 MEDICATION MONITORING ENCOUNTER: ICD-10-CM

## 2018-07-02 DIAGNOSIS — E11.29 TYPE 2 DIABETES MELLITUS WITH MICROALBUMINURIA, WITH LONG-TERM CURRENT USE OF INSULIN: Primary | ICD-10-CM

## 2018-07-02 LAB — GLUCOSE SERPL-MCNC: 160 MG/DL (ref 70–110)

## 2018-07-02 PROCEDURE — 99214 OFFICE O/P EST MOD 30 MIN: CPT | Mod: S$GLB,,, | Performed by: NURSE PRACTITIONER

## 2018-07-02 PROCEDURE — 3045F PR MOST RECENT HEMOGLOBIN A1C LEVEL 7.0-9.0%: CPT | Mod: CPTII,S$GLB,, | Performed by: NURSE PRACTITIONER

## 2018-07-02 PROCEDURE — 3078F DIAST BP <80 MM HG: CPT | Mod: CPTII,S$GLB,, | Performed by: NURSE PRACTITIONER

## 2018-07-02 PROCEDURE — 3077F SYST BP >= 140 MM HG: CPT | Mod: CPTII,S$GLB,, | Performed by: NURSE PRACTITIONER

## 2018-07-02 PROCEDURE — 99999 PR PBB SHADOW E&M-EST. PATIENT-LVL V: CPT | Mod: PBBFAC,,, | Performed by: NURSE PRACTITIONER

## 2018-07-02 PROCEDURE — 82948 REAGENT STRIP/BLOOD GLUCOSE: CPT | Mod: S$GLB,,, | Performed by: NURSE PRACTITIONER

## 2018-07-02 RX ORDER — INSULIN DEGLUDEC 100 U/ML
16 INJECTION, SOLUTION SUBCUTANEOUS DAILY
Qty: 45 ML | Refills: 3
Start: 2018-07-02 | End: 2018-08-16

## 2018-07-02 NOTE — PATIENT INSTRUCTIONS
Hypoglycemia (Low Blood Sugar)     Fast-acting sugar includes a cup of nonfat milk.     Too little sugar (glucose) in your blood is called hypoglycemia or low blood sugar. Low blood sugar usually means anything lower than 70 mg/dL. Talk with your healthcare provider about your target range and what level is too low for you. Diabetes itself doesnt cause low blood sugar. But some of the treatments for diabetes, such as pills or insulin, may raise your risk for it. Low blood sugar may cause you to pass out or have a seizure. So always treat low blood sugar right away, but don't overeat.  Special note: Always carry a source of fast-acting sugar and a snack in case of hypoglycemia.   What you may notice  If you have low blood sugar, you may have one or more of these symptoms:  · Shakiness or dizziness  · Cold, clammy skin or sweating  · Feelings of hunger  · Headache  · Nervousness  · A hard, fast heartbeat  · Weakness  · Confusion or irritability  · Blurred vision  · Having nightmares or waking up confused or sweating  · Numbness or tingling in the lips or tongue  What you should do  Here are tips to follow if you have hypoglycemia:   · First check your blood sugar. If it is too low (out of your target range), eat or drink 15 to 20 grams of fast-acting sugar. This may be 3 to 4 glucose tablets, 4 ounces (half a cup) of fruit juice or regular (nondiet) soda, 8 ounces (1 cup) of fat-free milk, or 1 tablespoon of honey. Dont take more than this, or your blood sugar may go too high.  · Wait 15 minutes. Then recheck your blood sugar if you can.  · If your blood sugar is still too low, repeat the steps above and check your blood sugar again. If your blood sugar still has not returned to your target range, contact your healthcare provider or seek emergency care.  · Once your blood sugar returns to target range, eat a snack or meal.  Preventing low blood sugar  Things you can do include the following:   · If your condition  needs a strict treatment plan, eat your meals and snacks at the same times each day. Dont skip meals!  · If your treatment plan lets you change when you eat and what you eat, learn how to change the time and dose of your rapid-acting insulin to match this.   · Ask your healthcare provider if it is safe for you to drink alcohol. Never drink on an empty stomach.  · Take your medicine at the prescribed times.  · Always carry a source of fast-acting sugar and a snack when youre away from home.  Other things to do  Additional tips include the following:  · Carry a medical ID card, a compact USB drive, or wear a medical alert bracelet or necklace. It should say that you have diabetes. It should also say what to do if you pass out or have a seizure.  · Make sure your family, friends, and coworkers know the signs of low blood sugar. Tell them what to do if your blood sugar falls very low and you cant treat yourself.  · Keep a glucagon emergency kit handy. Be sure your family, friends, and coworkers know how and when to use it. Check it regularly and replace the glucagon before it expires.  · Talk with your health care team about other things you can do to prevent low blood sugar.     If you have unexplained hypoglycemia or hypoglycemia several times, call your healthcare provider.   Date Last Reviewed: 5/1/2016  © 4281-3267 [employer]. [address] [place] [address], [place] [address]. All rights reserved. This information is not intended as a substitute for professional medical care. Always follow your healthcare professional's instructions.

## 2018-07-02 NOTE — PROGRESS NOTES
CC: Ms. Swetha Thompson arrives today for management of Type 2 DM and review of chronic medical conditions, as listed in the visit diagnosis section of this encounter.     HPI: Ms. Swetha Thompson was diagnosed with Type 2 DM in her 50s. Initial treatment consisted of oral medications and insulin was added in ~2011. Actos was added by PCP in 2018. + FH of DM in mother, brother, sister, maternal GM. Denies hospitalizations due to DM.      Last seen by me in May. At this time, Tresiba dose was decreased, due to hypoglycemia.     She was taken to ER on 6/26 via EMS, due to hypoglycemia in the 30s, which caused loss of consciousness at home. Her Tresiba dose was lowered further and Novolog was placed on hold.    Patient lives alone but her children check on her often.     BG readings are checked 1-2x/day.               Hypoglycemia: Not since ER  Symptoms: confusion, blurred vision  Treatment: juice, PB crackers. Glucagon was too expensive so she picked up glucose gel.     Missing Insulin/PO medication doses: yes  Timing prandial insulin 5-15 minutes before meals: n/a    Exercise: No     Dietary Habits: Eats 3 meals daily. Family has advised her to snack between meals on PB crackers or Glucerna, although she is not always hungry. Drinks mostly water.     Last DM education appointment: 5/2017    She states that she has not taken losartan today. Reports home BP range 120s/50-70s.     CURRENT DIABETIC MEDS: Metformin XR 1000 mg BID, Actos 15 mg daily, Tresiba 20 units QAM  Vial or pen: pen  Glucometer type: unsure    Previous DM meds:  Levemir  Novolog     Last Eye Exam: 2/2017, no DRWaylon Needs to reschedule.   Last Podiatry Exam: 2017    REVIEW OF SYSTEMS  Constitutional: no c/o weakness or weight loss. + fatigue. + 10 lb weight gain  Eyes: denies visual disturbances.  Cardiac: no palpitations or chest pain.  Respiratory: no dyspnea, cough.  GI: no c/o abdominal pain, nausea. Denies h/o pancreatitis.   Skin: no lesions  "or rashes.  Musculoskeletal: reports R neck/shoulder pain that is worse at the end of the day. This has been ongoing since January.   Neuro: no numbness, tingling, or parasthesia.  Endocrine: denies polyphagia, polydipsia, polyuria.    Personally reviewed Past Medical, Surgical, Social History.    Vital Signs  BP (!) 158/70   Pulse 89   Ht 5' 2" (1.575 m)   Wt 85.7 kg (189 lb 0.7 oz)   BMI 34.58 kg/m²     Personally reviewed the below labs:    Hemoglobin A1C   Date Value Ref Range Status   05/08/2018 8.1 (H) 4.0 - 5.6 % Final     Comment:     According to ADA guidelines, hemoglobin A1c <7.0% represents  optimal control in non-pregnant diabetic patients. Different  metrics may apply to specific patient populations.   Standards of Medical Care in Diabetes-2016.  For the purpose of screening for the presence of diabetes:  <5.7%     Consistent with the absence of diabetes  5.7-6.4%  Consistent with increasing risk for diabetes   (prediabetes)  >or=6.5%  Consistent with diabetes  Currently, no consensus exists for use of hemoglobin A1c  for diagnosis of diabetes for children.  This Hemoglobin A1c assay has significant interference with fetal   hemoglobin   (HbF). The results are invalid for patients with abnormal amounts of   HbF,   including those with known Hereditary Persistence   of Fetal Hemoglobin. Heterozygous hemoglobin variants (HbAS, HbAC,   HbAD, HbAE, HbA2) do not significantly interfere with this assay;   however, presence of multiple variants in a sample may impact the %   interference.     01/31/2018 9.2 (H) 4.0 - 5.6 % Final     Comment:     According to ADA guidelines, hemoglobin A1c <7.0% represents  optimal control in non-pregnant diabetic patients. Different  metrics may apply to specific patient populations.   Standards of Medical Care in Diabetes-2016.  For the purpose of screening for the presence of diabetes:  <5.7%     Consistent with the absence of diabetes  5.7-6.4%  Consistent with " increasing risk for diabetes   (prediabetes)  >or=6.5%  Consistent with diabetes  Currently, no consensus exists for use of hemoglobin A1c  for diagnosis of diabetes for children.  This Hemoglobin A1c assay has significant interference with fetal   hemoglobin   (HbF). The results are invalid for patients with abnormal amounts of   HbF,   including those with known Hereditary Persistence   of Fetal Hemoglobin. Heterozygous hemoglobin variants (HbAS, HbAC,   HbAD, HbAE, HbA2) do not significantly interfere with this assay;   however, presence of multiple variants in a sample may impact the %   interference.     10/31/2017 11.0 (H) 4.0 - 5.6 % Final     Comment:     According to ADA guidelines, hemoglobin A1c <7.0% represents  optimal control in non-pregnant diabetic patients. Different  metrics may apply to specific patient populations.   Standards of Medical Care in Diabetes-2016.  For the purpose of screening for the presence of diabetes:  <5.7%     Consistent with the absence of diabetes  5.7-6.4%  Consistent with increasing risk for diabetes   (prediabetes)  >or=6.5%  Consistent with diabetes  Currently, no consensus exists for use of hemoglobin A1c  for diagnosis of diabetes for children.  This Hemoglobin A1c assay has significant interference with fetal   hemoglobin   (HbF). The results are invalid for patients with abnormal amounts of   HbF,   including those with known Hereditary Persistence   of Fetal Hemoglobin. Heterozygous hemoglobin variants (HbAS, HbAC,   HbAD, HbAE, HbA2) do not significantly interfere with this assay;   however, presence of multiple variants in a sample may impact the %   interference.         Chemistry        Component Value Date/Time     06/26/2018 0727    K 3.6 06/26/2018 0727     06/26/2018 0727    CO2 26 06/26/2018 0727    BUN 20 (H) 06/26/2018 0727    CREATININE 0.81 06/26/2018 0727     (H) 06/26/2018 0727        Component Value Date/Time    CALCIUM 9.0  06/26/2018 0727    ALKPHOS 60 06/26/2018 0727    AST 23 06/26/2018 0727    ALT 26 06/26/2018 0727    BILITOT 0.2 06/26/2018 0727    ESTGFRAFRICA >60 06/26/2018 0727    EGFRNONAA >60 06/26/2018 0727          Lab Results   Component Value Date    CHOL 179 10/31/2017    CHOL 172 07/20/2017    CHOL 167 10/01/2016     Lab Results   Component Value Date    HDL 59 10/31/2017    HDL 59 07/20/2017    HDL 77 (H) 10/01/2016     Lab Results   Component Value Date    LDLCALC 103.0 10/31/2017    LDLCALC 97.4 07/20/2017    LDLCALC 76.8 10/01/2016     Lab Results   Component Value Date    TRIG 85 10/31/2017    TRIG 78 07/20/2017    TRIG 66 10/01/2016     Lab Results   Component Value Date    CHOLHDL 33.0 10/31/2017    CHOLHDL 34.3 07/20/2017    CHOLHDL 46.1 10/01/2016       Lab Results   Component Value Date    MICALBCREAT 16.5 05/08/2018     Lab Results   Component Value Date    TSH 3.485 07/20/2017     Estimated Creatinine Clearance: 61.9 mL/min (based on SCr of 0.81 mg/dL).    No results found for: [id number]      A1c target < 7.5%      Assessment/Plan  1. Type 2 diabetes mellitus with microalbuminuria, with long-term current use of insulin  -- Significant decrease in insulin requirements over the past few months. POCT glucose 106 mg/dL in clinic. Since ER visit and Tresiba dose decrease, glucoses have been within normal range. I would rather her fasting BG range 100-150 mg/dL.    -- Decrease Tresiba to 16 units daily.  -- continue metformin and Actos  -- check BG 2x/day. Call if having readings < 100 mg/dL or if she has hypoglycemia.     -- Discussed diagnosis of DM, A1c goals, progression of disease, long term complications and tx options.  Advised patient to check BG before activities, such as driving or exercise.  -- Reviewed hypoglycemia management: treat with 1/2 glass of juice, 1/2 can regular coke, or 4 glucose tablets. Monitor and repeat treatment every 15 minutes until BG is >70 Then have a snack, which includes a  complex carbohydrate and protein.    -- takes ARB, statin   2. Hypertension -- elevated today.  -- I advised her to take losartan when she gets home.  -- Keep log and call PCP if SBP >150 consistently.    3. Hypoglycemia associated with Type 2 DM -- discussed treatment in detail.  -- advised pt to notify me if lows continue after Tresiba dose decrease today.    4. Medication monitoring encounter -- Total Time and Counselin minutes, >50% time spent counseling as noted above in #1 A/P.          FOLLOW UP  Follow-up as scheduled.   Patient instructed to bring BG logs to each follow up   Patient encouraged to call for any BG/medication issues, concerns, or questions.    Orders Placed This Encounter   Procedures    POCT glucose

## 2018-08-06 ENCOUNTER — LAB VISIT (OUTPATIENT)
Dept: LAB | Facility: HOSPITAL | Age: 73
End: 2018-08-06
Attending: NURSE PRACTITIONER
Payer: MEDICARE

## 2018-08-06 DIAGNOSIS — E11.29 TYPE 2 DIABETES MELLITUS WITH MICROALBUMINURIA, WITH LONG-TERM CURRENT USE OF INSULIN: ICD-10-CM

## 2018-08-06 DIAGNOSIS — Z79.4 TYPE 2 DIABETES MELLITUS WITH MICROALBUMINURIA, WITH LONG-TERM CURRENT USE OF INSULIN: ICD-10-CM

## 2018-08-06 DIAGNOSIS — R80.9 TYPE 2 DIABETES MELLITUS WITH MICROALBUMINURIA, WITH LONG-TERM CURRENT USE OF INSULIN: ICD-10-CM

## 2018-08-06 LAB
ANION GAP SERPL CALC-SCNC: 10 MMOL/L
BUN SERPL-MCNC: 22 MG/DL
CALCIUM SERPL-MCNC: 9.2 MG/DL
CHLORIDE SERPL-SCNC: 105 MMOL/L
CO2 SERPL-SCNC: 25 MMOL/L
CREAT SERPL-MCNC: 1.1 MG/DL
EST. GFR  (AFRICAN AMERICAN): 57.2 ML/MIN/1.73 M^2
EST. GFR  (NON AFRICAN AMERICAN): 49.6 ML/MIN/1.73 M^2
ESTIMATED AVG GLUCOSE: 143 MG/DL
GLUCOSE SERPL-MCNC: 150 MG/DL
HBA1C MFR BLD HPLC: 6.6 %
POTASSIUM SERPL-SCNC: 4 MMOL/L
SODIUM SERPL-SCNC: 140 MMOL/L
TSH SERPL DL<=0.005 MIU/L-ACNC: 3.58 UIU/ML

## 2018-08-06 PROCEDURE — 80048 BASIC METABOLIC PNL TOTAL CA: CPT

## 2018-08-06 PROCEDURE — 84443 ASSAY THYROID STIM HORMONE: CPT

## 2018-08-06 PROCEDURE — 36415 COLL VENOUS BLD VENIPUNCTURE: CPT | Mod: PO

## 2018-08-06 PROCEDURE — 83036 HEMOGLOBIN GLYCOSYLATED A1C: CPT

## 2018-08-16 ENCOUNTER — OFFICE VISIT (OUTPATIENT)
Dept: ENDOCRINOLOGY | Facility: CLINIC | Age: 73
End: 2018-08-16
Payer: MEDICARE

## 2018-08-16 VITALS
HEART RATE: 116 BPM | WEIGHT: 193 LBS | HEIGHT: 62 IN | RESPIRATION RATE: 18 BRPM | SYSTOLIC BLOOD PRESSURE: 142 MMHG | BODY MASS INDEX: 35.51 KG/M2 | DIASTOLIC BLOOD PRESSURE: 78 MMHG

## 2018-08-16 DIAGNOSIS — Z79.4 TYPE 2 DIABETES MELLITUS WITH DIABETIC CATARACT, WITH LONG-TERM CURRENT USE OF INSULIN: Primary | ICD-10-CM

## 2018-08-16 DIAGNOSIS — E11.649 HYPOGLYCEMIA ASSOCIATED WITH TYPE 2 DIABETES MELLITUS: ICD-10-CM

## 2018-08-16 DIAGNOSIS — E11.36 TYPE 2 DIABETES MELLITUS WITH DIABETIC CATARACT, WITH LONG-TERM CURRENT USE OF INSULIN: Primary | ICD-10-CM

## 2018-08-16 DIAGNOSIS — I10 ESSENTIAL HYPERTENSION: ICD-10-CM

## 2018-08-16 LAB — GLUCOSE SERPL-MCNC: 160 MG/DL (ref 70–110)

## 2018-08-16 PROCEDURE — 3078F DIAST BP <80 MM HG: CPT | Mod: CPTII,S$GLB,, | Performed by: NURSE PRACTITIONER

## 2018-08-16 PROCEDURE — 3044F HG A1C LEVEL LT 7.0%: CPT | Mod: CPTII,S$GLB,, | Performed by: NURSE PRACTITIONER

## 2018-08-16 PROCEDURE — 82948 REAGENT STRIP/BLOOD GLUCOSE: CPT | Mod: S$GLB,,, | Performed by: NURSE PRACTITIONER

## 2018-08-16 PROCEDURE — 3077F SYST BP >= 140 MM HG: CPT | Mod: CPTII,S$GLB,, | Performed by: NURSE PRACTITIONER

## 2018-08-16 PROCEDURE — 99999 PR PBB SHADOW E&M-EST. PATIENT-LVL V: CPT | Mod: PBBFAC,,, | Performed by: NURSE PRACTITIONER

## 2018-08-16 PROCEDURE — 99214 OFFICE O/P EST MOD 30 MIN: CPT | Mod: S$GLB,,, | Performed by: NURSE PRACTITIONER

## 2018-08-16 NOTE — PROGRESS NOTES
CC: Ms. Swetha Thompson arrives today for management of Type 2 DM and review of chronic medical conditions, as listed in the visit diagnosis section of this encounter.     HPI: Ms. Swetha Thompson was diagnosed with Type 2 DM in her 50s. Initial treatment consisted of oral medications and insulin was added in ~2011. Actos was added by PCP in 2018. + FH of DM in mother, brother, sister, maternal GM. Past ER visit on 6/26/18 via EMS, due to hypoglycemia in the 30s, which caused loss of consciousness at home. Insulin doses were adjusted at this time.      Last seen by me in July, following bout of severe hypoglycemia. At this time, Tresiba dose was decreased.    Her eating habits have significantly changed over the past few months. Eating less sweets and carbs overall.     BG readings are checked 1-2x/day.               Hypoglycemia: Yes - am or afternoon  Symptoms: confusion, blurred vision, jittery  Treatment: juice, PB crackers. Glucagon was too expensive. Has glucose tabs.     Missing Insulin/PO medication doses: No  Timing prandial insulin 5-15 minutes before meals: n/a    Exercise: No     Dietary Habits: Eats 3 meals daily. Rare snacking.  Drinks mostly water.     Last DM education appointment: 5/2017        CURRENT DIABETIC MEDS: Metformin XR 1000 mg BID, Actos 15 mg daily, Tresiba 16 units QAM  Vial or pen: pen  Glucometer type: unsure    Previous DM meds:  Levemir  Novolog     Last Eye Exam: 2/2017, no DR. Needs to reschedule.   Last Podiatry Exam: 2017    REVIEW OF SYSTEMS  Constitutional: no c/o weakness or weight loss. + fatigue. + slight weight gain  Eyes: denies visual disturbances.  Cardiac: no chest pain. Reports rare palpitations that are brief and self limited. She believes this is related to hypoglycemia.   Respiratory: no dyspnea, cough.  GI: no c/o abdominal pain, nausea. Denies h/o pancreatitis.   Skin: no lesions or rashes.  Neuro: + numbness, tingling in B hands that comes and  "goes  Endocrine: denies polyphagia, polydipsia, polyuria.    Personally reviewed Past Medical, Surgical, Social History.    Vital Signs  BP (!) 142/78   Pulse (!) 116   Resp 18   Ht 5' 2" (1.575 m)   Wt 87.5 kg (193 lb)   BMI 35.30 kg/m²      Personally reviewed the below labs:    Hemoglobin A1C   Date Value Ref Range Status   08/06/2018 6.6 (H) 4.0 - 5.6 % Final     Comment:     ADA Screening Guidelines:  5.7-6.4%  Consistent with prediabetes  >or=6.5%  Consistent with diabetes  High levels of fetal hemoglobin interfere with the HbA1C  assay. Heterozygous hemoglobin variants (HbS, HgC, etc)do  not significantly interfere with this assay.   However, presence of multiple variants may affect accuracy.     05/08/2018 8.1 (H) 4.0 - 5.6 % Final     Comment:     According to ADA guidelines, hemoglobin A1c <7.0% represents  optimal control in non-pregnant diabetic patients. Different  metrics may apply to specific patient populations.   Standards of Medical Care in Diabetes-2016.  For the purpose of screening for the presence of diabetes:  <5.7%     Consistent with the absence of diabetes  5.7-6.4%  Consistent with increasing risk for diabetes   (prediabetes)  >or=6.5%  Consistent with diabetes  Currently, no consensus exists for use of hemoglobin A1c  for diagnosis of diabetes for children.  This Hemoglobin A1c assay has significant interference with fetal   hemoglobin   (HbF). The results are invalid for patients with abnormal amounts of   HbF,   including those with known Hereditary Persistence   of Fetal Hemoglobin. Heterozygous hemoglobin variants (HbAS, HbAC,   HbAD, HbAE, HbA2) do not significantly interfere with this assay;   however, presence of multiple variants in a sample may impact the %   interference.     01/31/2018 9.2 (H) 4.0 - 5.6 % Final     Comment:     According to ADA guidelines, hemoglobin A1c <7.0% represents  optimal control in non-pregnant diabetic patients. Different  metrics may apply to " specific patient populations.   Standards of Medical Care in Diabetes-2016.  For the purpose of screening for the presence of diabetes:  <5.7%     Consistent with the absence of diabetes  5.7-6.4%  Consistent with increasing risk for diabetes   (prediabetes)  >or=6.5%  Consistent with diabetes  Currently, no consensus exists for use of hemoglobin A1c  for diagnosis of diabetes for children.  This Hemoglobin A1c assay has significant interference with fetal   hemoglobin   (HbF). The results are invalid for patients with abnormal amounts of   HbF,   including those with known Hereditary Persistence   of Fetal Hemoglobin. Heterozygous hemoglobin variants (HbAS, HbAC,   HbAD, HbAE, HbA2) do not significantly interfere with this assay;   however, presence of multiple variants in a sample may impact the %   interference.         Chemistry        Component Value Date/Time     08/06/2018 1013    K 4.0 08/06/2018 1013     08/06/2018 1013    CO2 25 08/06/2018 1013    BUN 22 08/06/2018 1013    CREATININE 1.1 08/06/2018 1013     (H) 08/06/2018 1013        Component Value Date/Time    CALCIUM 9.2 08/06/2018 1013    ALKPHOS 60 06/26/2018 0727    AST 23 06/26/2018 0727    ALT 26 06/26/2018 0727    BILITOT 0.2 06/26/2018 0727    ESTGFRAFRICA 57.2 (A) 08/06/2018 1013    EGFRNONAA 49.6 (A) 08/06/2018 1013          Lab Results   Component Value Date    CHOL 179 10/31/2017    CHOL 172 07/20/2017    CHOL 167 10/01/2016     Lab Results   Component Value Date    HDL 59 10/31/2017    HDL 59 07/20/2017    HDL 77 (H) 10/01/2016     Lab Results   Component Value Date    LDLCALC 103.0 10/31/2017    LDLCALC 97.4 07/20/2017    LDLCALC 76.8 10/01/2016     Lab Results   Component Value Date    TRIG 85 10/31/2017    TRIG 78 07/20/2017    TRIG 66 10/01/2016     Lab Results   Component Value Date    CHOLHDL 33.0 10/31/2017    CHOLHDL 34.3 07/20/2017    CHOLHDL 46.1 10/01/2016       Lab Results   Component Value Date    MICALBCREAT  16.5 05/08/2018     Lab Results   Component Value Date    TSH 3.576 08/06/2018     CrCl cannot be calculated (Patient's most recent lab result is older than the maximum 7 days allowed.).    No results found for: [id number]     PHYSICAL EXAMINATION  Constitutional: Appears well, NAD.  Neck: Supple, trachea midline; no thyromegaly or nodules.   Respiratory: CTA, even and unlabored.  Cardiovascular: RRR, no murmurs, no carotid bruits. DP pulses  2+ bilaterally; no edema.    Lymph: no cervical or supraclavicular lymphadenopathy  Skin: warm and dry; no lipohypertrophy, or acanthosis nigracans observed.  Neuro: DTR diminished to BUE/2+ BLE. No loss of protective sensation via 10 gm monofilament or vibratory exam bilaterally.  Feet: appropriate footwear. No open wounds or calluses. + thickened toe nails, dry skin.       A1c target < 7.5%      Assessment/Plan  1. Type 2 diabetes mellitus with diabetic cataract, with long-term current use of insulin  -- Insulin needs have decreased significantly, likely due to dietary changes. Continues with hypoglycemia.   -- Discontinue Tresiba. After 1 week, if AM glucoses are persistently >150, may add Tresiba back at reduced dose of 10 units.   -- continue metformin and Actos   -- check BG 2x/day.     -- Discussed diagnosis of DM, A1c goals, progression of disease, long term complications and tx options.  Advised patient to check BG before activities, such as driving or exercise.    -- takes ARB, statin   2. Hypertension -- mildly elevated today  -- continue current meds and monitor   3. Hypoglycemia associated with Type 2 DM  -- will trial discontinuing insulin       FOLLOW UP  Follow-up in about 4 months (around 12/16/2018).  Patient instructed to bring BG logs to each follow up   Patient encouraged to call for any BG/medication issues, concerns, or questions.    Orders Placed This Encounter   Procedures    Hemoglobin A1c    Comprehensive metabolic panel    Lipid panel    POCT  glucose

## 2018-08-16 NOTE — PATIENT INSTRUCTIONS
Hypoglycemia (Low Blood Sugar)     Fast-acting sugar includes a cup of nonfat milk.     Too little sugar (glucose) in your blood is called hypoglycemia or low blood sugar. Low blood sugar usually means anything lower than 70 mg/dL. Talk with your healthcare provider about your target range and what level is too low for you. Diabetes itself doesnt cause low blood sugar. But some of the treatments for diabetes, such as pills or insulin, may raise your risk for it. Low blood sugar may cause you to pass out or have a seizure. So always treat low blood sugar right away, but don't overeat.  Special note: Always carry a source of fast-acting sugar and a snack in case of hypoglycemia.   What you may notice  If you have low blood sugar, you may have one or more of these symptoms:  · Shakiness or dizziness  · Cold, clammy skin or sweating  · Feelings of hunger  · Headache  · Nervousness  · A hard, fast heartbeat  · Weakness  · Confusion or irritability  · Blurred vision  · Having nightmares or waking up confused or sweating  · Numbness or tingling in the lips or tongue  What you should do  Here are tips to follow if you have hypoglycemia:   · First check your blood sugar. If it is too low (out of your target range), eat or drink 15 to 20 grams of fast-acting sugar. This may be 3 to 4 glucose tablets, 4 ounces (half a cup) of fruit juice or regular (nondiet) soda, 8 ounces (1 cup) of fat-free milk, or 1 tablespoon of honey. Dont take more than this, or your blood sugar may go too high.  · Wait 15 minutes. Then recheck your blood sugar if you can.  · If your blood sugar is still too low, repeat the steps above and check your blood sugar again. If your blood sugar still has not returned to your target range, contact your healthcare provider or seek emergency care.  · Once your blood sugar returns to target range, eat a snack or meal.  Preventing low blood sugar  Things you can do include the following:   · If your condition  needs a strict treatment plan, eat your meals and snacks at the same times each day. Dont skip meals!  · If your treatment plan lets you change when you eat and what you eat, learn how to change the time and dose of your rapid-acting insulin to match this.   · Ask your healthcare provider if it is safe for you to drink alcohol. Never drink on an empty stomach.  · Take your medicine at the prescribed times.  · Always carry a source of fast-acting sugar and a snack when youre away from home.  Other things to do  Additional tips include the following:  · Carry a medical ID card, a compact USB drive, or wear a medical alert bracelet or necklace. It should say that you have diabetes. It should also say what to do if you pass out or have a seizure.  · Make sure your family, friends, and coworkers know the signs of low blood sugar. Tell them what to do if your blood sugar falls very low and you cant treat yourself.  · Keep a glucagon emergency kit handy. Be sure your family, friends, and coworkers know how and when to use it. Check it regularly and replace the glucagon before it expires.  · Talk with your health care team about other things you can do to prevent low blood sugar.     If you have unexplained hypoglycemia or hypoglycemia several times, call your healthcare provider.   Date Last Reviewed: 5/1/2016  © 0314-9983 [employer]. [address] [place] [address], [place] [address]. All rights reserved. This information is not intended as a substitute for professional medical care. Always follow your healthcare professional's instructions.

## 2018-08-29 RX ORDER — INSULIN ASPART 100 [IU]/ML
INJECTION, SOLUTION INTRAVENOUS; SUBCUTANEOUS
Qty: 15 SYRINGE | Refills: 3 | Status: SHIPPED | OUTPATIENT
Start: 2018-08-29 | End: 2018-12-13

## 2018-09-10 DIAGNOSIS — E11.29 TYPE 2 DIABETES MELLITUS WITH MICROALBUMINURIA, WITH LONG-TERM CURRENT USE OF INSULIN: ICD-10-CM

## 2018-09-10 DIAGNOSIS — R80.9 TYPE 2 DIABETES MELLITUS WITH MICROALBUMINURIA, WITH LONG-TERM CURRENT USE OF INSULIN: ICD-10-CM

## 2018-09-10 DIAGNOSIS — Z79.4 TYPE 2 DIABETES MELLITUS WITH MICROALBUMINURIA, WITH LONG-TERM CURRENT USE OF INSULIN: ICD-10-CM

## 2018-09-11 RX ORDER — METFORMIN HYDROCHLORIDE 500 MG/1
TABLET, EXTENDED RELEASE ORAL
Qty: 360 TABLET | Refills: 3 | Status: SHIPPED | OUTPATIENT
Start: 2018-09-11 | End: 2022-01-03 | Stop reason: SDUPTHER

## 2018-11-27 RX ORDER — INSULIN DEGLUDEC 100 U/ML
INJECTION, SOLUTION SUBCUTANEOUS
Qty: 45 ML | Refills: 3 | OUTPATIENT
Start: 2018-11-27

## 2018-12-06 ENCOUNTER — LAB VISIT (OUTPATIENT)
Dept: LAB | Facility: HOSPITAL | Age: 73
End: 2018-12-06
Attending: NURSE PRACTITIONER
Payer: MEDICARE

## 2018-12-06 DIAGNOSIS — Z79.4 TYPE 2 DIABETES MELLITUS WITH DIABETIC CATARACT, WITH LONG-TERM CURRENT USE OF INSULIN: ICD-10-CM

## 2018-12-06 DIAGNOSIS — E11.36 TYPE 2 DIABETES MELLITUS WITH DIABETIC CATARACT, WITH LONG-TERM CURRENT USE OF INSULIN: ICD-10-CM

## 2018-12-06 LAB
ALBUMIN SERPL BCP-MCNC: 3.3 G/DL
ALP SERPL-CCNC: 51 U/L
ALT SERPL W/O P-5'-P-CCNC: 12 U/L
ANION GAP SERPL CALC-SCNC: 8 MMOL/L
AST SERPL-CCNC: 16 U/L
BILIRUB SERPL-MCNC: 0.4 MG/DL
BUN SERPL-MCNC: 15 MG/DL
CALCIUM SERPL-MCNC: 9 MG/DL
CHLORIDE SERPL-SCNC: 106 MMOL/L
CHOLEST SERPL-MCNC: 165 MG/DL
CHOLEST/HDLC SERPL: 2.9 {RATIO}
CO2 SERPL-SCNC: 26 MMOL/L
CREAT SERPL-MCNC: 0.9 MG/DL
EST. GFR  (AFRICAN AMERICAN): >60 ML/MIN/1.73 M^2
EST. GFR  (NON AFRICAN AMERICAN): >60 ML/MIN/1.73 M^2
ESTIMATED AVG GLUCOSE: 151 MG/DL
GLUCOSE SERPL-MCNC: 145 MG/DL
HBA1C MFR BLD HPLC: 6.9 %
HDLC SERPL-MCNC: 56 MG/DL
HDLC SERPL: 33.9 %
LDLC SERPL CALC-MCNC: 96 MG/DL
NONHDLC SERPL-MCNC: 109 MG/DL
POTASSIUM SERPL-SCNC: 3.7 MMOL/L
PROT SERPL-MCNC: 7.1 G/DL
SODIUM SERPL-SCNC: 140 MMOL/L
TRIGL SERPL-MCNC: 65 MG/DL

## 2018-12-06 PROCEDURE — 36415 COLL VENOUS BLD VENIPUNCTURE: CPT | Mod: PO

## 2018-12-06 PROCEDURE — 80061 LIPID PANEL: CPT

## 2018-12-06 PROCEDURE — 83036 HEMOGLOBIN GLYCOSYLATED A1C: CPT

## 2018-12-06 PROCEDURE — 80053 COMPREHEN METABOLIC PANEL: CPT

## 2018-12-13 ENCOUNTER — OFFICE VISIT (OUTPATIENT)
Dept: ENDOCRINOLOGY | Facility: CLINIC | Age: 73
End: 2018-12-13
Payer: MEDICARE

## 2018-12-13 VITALS
WEIGHT: 183 LBS | DIASTOLIC BLOOD PRESSURE: 84 MMHG | SYSTOLIC BLOOD PRESSURE: 136 MMHG | HEART RATE: 88 BPM | HEIGHT: 62 IN | BODY MASS INDEX: 33.68 KG/M2

## 2018-12-13 DIAGNOSIS — Z79.4 TYPE 2 DIABETES MELLITUS WITH DIABETIC CATARACT, WITH LONG-TERM CURRENT USE OF INSULIN: Primary | ICD-10-CM

## 2018-12-13 DIAGNOSIS — E11.36 TYPE 2 DIABETES MELLITUS WITH DIABETIC CATARACT, WITH LONG-TERM CURRENT USE OF INSULIN: Primary | ICD-10-CM

## 2018-12-13 DIAGNOSIS — I10 ESSENTIAL HYPERTENSION: ICD-10-CM

## 2018-12-13 PROCEDURE — 3075F SYST BP GE 130 - 139MM HG: CPT | Mod: CPTII,S$GLB,, | Performed by: NURSE PRACTITIONER

## 2018-12-13 PROCEDURE — 3079F DIAST BP 80-89 MM HG: CPT | Mod: CPTII,S$GLB,, | Performed by: NURSE PRACTITIONER

## 2018-12-13 PROCEDURE — 99214 OFFICE O/P EST MOD 30 MIN: CPT | Mod: S$GLB,,, | Performed by: NURSE PRACTITIONER

## 2018-12-13 PROCEDURE — 1101F PT FALLS ASSESS-DOCD LE1/YR: CPT | Mod: CPTII,S$GLB,, | Performed by: NURSE PRACTITIONER

## 2018-12-13 PROCEDURE — 3044F HG A1C LEVEL LT 7.0%: CPT | Mod: CPTII,S$GLB,, | Performed by: NURSE PRACTITIONER

## 2018-12-13 PROCEDURE — 99999 PR PBB SHADOW E&M-EST. PATIENT-LVL V: CPT | Mod: PBBFAC,,, | Performed by: NURSE PRACTITIONER

## 2018-12-13 RX ORDER — INSULIN DEGLUDEC 100 U/ML
10 INJECTION, SOLUTION SUBCUTANEOUS DAILY
COMMUNITY
End: 2019-10-07

## 2018-12-13 NOTE — PROGRESS NOTES
CC: Ms. Swetha Thompson arrives today for management of Type 2 DM and review of chronic medical conditions, as listed in the visit diagnosis section of this encounter.     HPI: Ms. Swetha Thompson was diagnosed with Type 2 DM in her 50s. Initial treatment consisted of oral medications and insulin was added in ~2011. Actos was added by PCP in 2018. + FH of DM in mother, brother, sister, maternal GM. Past ER visit on 6/26/18 via EMS, due to hypoglycemia in the 30s, which caused loss of consciousness at home. Insulin doses were adjusted at this time.      Patient was last seen by me in August. Insulin was discontinued at that time. However, her readings increased >150 mg/dL so she resumed Tresiba 10 units daily.     She is interested in Freestyle Jacey CGMS.    BG readings are checked 1x/day.           Hypoglycemia: Rare since decrease in Tresiba dose  Symptoms: confusion, blurred vision, jittery  Treatment: juice, PB crackers.     Missing Insulin/PO medication doses: No  Timing prandial insulin 5-15 minutes before meals: n/a    Exercise: No but active with her grandchildren.      Dietary Habits: Eats 3 meals daily. Rare snacking. Avoids sugary beverages.     Last DM education appointment: 5/2017        CURRENT DIABETIC MEDS: Metformin XR 1000 mg BID, Actos 15 mg daily, Tresiba 10 units QAM  Vial or pen: pen  Glucometer type: unsure    Previous DM meds:  Levemir  Novolog     Last Eye Exam: 9/6/2018, no DR.   Last Podiatry Exam: 2017    REVIEW OF SYSTEMS  Constitutional: no c/o weakness. + fatigue. + 11# weight loss that she attributes to decreasing snacking.  Eyes: denies visual disturbances.  Cardiac: no chest pain, palpitations.  Respiratory: no dyspnea, cough.  GI: no c/o abdominal pain, nausea. Denies h/o pancreatitis.   Skin: no lesions or rashes.  Neuro: no numbness, tingling, paresthesias.   Endocrine: denies polyphagia, polydipsia, polyuria.    Personally reviewed Past Medical, Surgical, Social  "History.    Vital Signs  /84   Pulse 88   Ht 5' 2" (1.575 m)   Wt 83 kg (182 lb 15.7 oz)   BMI 33.47 kg/m²      Personally reviewed the below labs:    Hemoglobin A1C   Date Value Ref Range Status   12/06/2018 6.9 (H) 4.0 - 5.6 % Final     Comment:     ADA Screening Guidelines:  5.7-6.4%  Consistent with prediabetes  >or=6.5%  Consistent with diabetes  High levels of fetal hemoglobin interfere with the HbA1C  assay. Heterozygous hemoglobin variants (HbS, HgC, etc)do  not significantly interfere with this assay.   However, presence of multiple variants may affect accuracy.     08/06/2018 6.6 (H) 4.0 - 5.6 % Final     Comment:     ADA Screening Guidelines:  5.7-6.4%  Consistent with prediabetes  >or=6.5%  Consistent with diabetes  High levels of fetal hemoglobin interfere with the HbA1C  assay. Heterozygous hemoglobin variants (HbS, HgC, etc)do  not significantly interfere with this assay.   However, presence of multiple variants may affect accuracy.     05/08/2018 8.1 (H) 4.0 - 5.6 % Final     Comment:     According to ADA guidelines, hemoglobin A1c <7.0% represents  optimal control in non-pregnant diabetic patients. Different  metrics may apply to specific patient populations.   Standards of Medical Care in Diabetes-2016.  For the purpose of screening for the presence of diabetes:  <5.7%     Consistent with the absence of diabetes  5.7-6.4%  Consistent with increasing risk for diabetes   (prediabetes)  >or=6.5%  Consistent with diabetes  Currently, no consensus exists for use of hemoglobin A1c  for diagnosis of diabetes for children.  This Hemoglobin A1c assay has significant interference with fetal   hemoglobin   (HbF). The results are invalid for patients with abnormal amounts of   HbF,   including those with known Hereditary Persistence   of Fetal Hemoglobin. Heterozygous hemoglobin variants (HbAS, HbAC,   HbAD, HbAE, HbA2) do not significantly interfere with this assay;   however, presence of multiple " variants in a sample may impact the %   interference.         Chemistry        Component Value Date/Time     12/06/2018 0736    K 3.7 12/06/2018 0736     12/06/2018 0736    CO2 26 12/06/2018 0736    BUN 15 12/06/2018 0736    CREATININE 0.9 12/06/2018 0736     (H) 12/06/2018 0736        Component Value Date/Time    CALCIUM 9.0 12/06/2018 0736    ALKPHOS 51 (L) 12/06/2018 0736    AST 16 12/06/2018 0736    ALT 12 12/06/2018 0736    BILITOT 0.4 12/06/2018 0736    ESTGFRAFRICA >60.0 12/06/2018 0736    EGFRNONAA >60.0 12/06/2018 0736          Lab Results   Component Value Date    CHOL 165 12/06/2018    CHOL 179 10/31/2017    CHOL 172 07/20/2017     Lab Results   Component Value Date    HDL 56 12/06/2018    HDL 59 10/31/2017    HDL 59 07/20/2017     Lab Results   Component Value Date    LDLCALC 96.0 12/06/2018    LDLCALC 103.0 10/31/2017    LDLCALC 97.4 07/20/2017     Lab Results   Component Value Date    TRIG 65 12/06/2018    TRIG 85 10/31/2017    TRIG 78 07/20/2017     Lab Results   Component Value Date    CHOLHDL 33.9 12/06/2018    CHOLHDL 33.0 10/31/2017    CHOLHDL 34.3 07/20/2017       Lab Results   Component Value Date    MICALBCREAT 16.5 05/08/2018     Lab Results   Component Value Date    TSH 3.576 08/06/2018     CrCl cannot be calculated (Patient's most recent lab result is older than the maximum 7 days allowed.).    No results found for: [id number]     PHYSICAL EXAMINATION  Constitutional: Appears well, NAD.  Neck: Supple, trachea midline; no thyromegaly or nodules.   Respiratory: CTA, even and unlabored.  Cardiovascular: RRR, no murmurs, no carotid bruits. DP pulses  2+ bilaterally; no edema.    Lymph: no cervical or supraclavicular lymphadenopathy  Skin: warm and dry; no lipohypertrophy, or acanthosis nigracans observed.  Neuro: DTR diminished to BUE/2+ BLE. Previously, no loss of protective sensation via 10 gm monofilament or vibratory exam bilaterally.  Feet: appropriate footwear.        A1c target < 7.5%      Assessment/Plan  1. Type 2 diabetes mellitus with diabetic cataract, with long-term current use of insulin  -- Controlled without pattern of hypoglycemia.   -- continue Tresiba 10 units QAM.   -- continue metformin and Actos   -- check BG 1x/day and notify me if BG consistently < 80.  -- discussed Medicare criteria for Freestyle Jacey, which she doesn't meet. She would rather continue with fingersticks.     -- Discussed diagnosis of DM, A1c goals, progression of disease, long term complications and tx options.    -- Reviewed hypoglycemia management: treat with 1/2 glass of juice, 1/2 can regular coke, or 4 glucose tablets. Monitor and repeat treatment every 15 minutes until BG is >70 Then have a snack, which includes a complex carbohydrate and protein.   Advised patient to check BG before activities, such as driving or exercise.    -- takes ARB, statin   2. Hypertension -- acceptable.   -- continue losartan       FOLLOW UP  Follow-up in about 4 months (around 4/13/2019).  Patient instructed to bring BG logs to each follow up   Patient encouraged to call for any BG/medication issues, concerns, or questions.    Orders Placed This Encounter   Procedures    Hemoglobin A1c

## 2019-04-04 ENCOUNTER — LAB VISIT (OUTPATIENT)
Dept: LAB | Facility: HOSPITAL | Age: 74
End: 2019-04-04
Attending: NURSE PRACTITIONER
Payer: MEDICARE

## 2019-04-04 DIAGNOSIS — Z79.4 TYPE 2 DIABETES MELLITUS WITH DIABETIC CATARACT, WITH LONG-TERM CURRENT USE OF INSULIN: ICD-10-CM

## 2019-04-04 DIAGNOSIS — E11.36 TYPE 2 DIABETES MELLITUS WITH DIABETIC CATARACT, WITH LONG-TERM CURRENT USE OF INSULIN: ICD-10-CM

## 2019-04-04 LAB
ESTIMATED AVG GLUCOSE: 148 MG/DL (ref 68–131)
HBA1C MFR BLD HPLC: 6.8 % (ref 4–5.6)

## 2019-04-04 PROCEDURE — 83036 HEMOGLOBIN GLYCOSYLATED A1C: CPT

## 2019-04-04 PROCEDURE — 36415 COLL VENOUS BLD VENIPUNCTURE: CPT | Mod: PO

## 2019-04-08 ENCOUNTER — OFFICE VISIT (OUTPATIENT)
Dept: ENDOCRINOLOGY | Facility: CLINIC | Age: 74
End: 2019-04-08
Payer: MEDICARE

## 2019-04-08 VITALS
DIASTOLIC BLOOD PRESSURE: 80 MMHG | WEIGHT: 172.19 LBS | HEIGHT: 62 IN | SYSTOLIC BLOOD PRESSURE: 140 MMHG | BODY MASS INDEX: 31.68 KG/M2 | HEART RATE: 100 BPM

## 2019-04-08 DIAGNOSIS — Z79.4 TYPE 2 DIABETES MELLITUS WITH DIABETIC CATARACT, WITH LONG-TERM CURRENT USE OF INSULIN: Primary | ICD-10-CM

## 2019-04-08 DIAGNOSIS — I10 ESSENTIAL HYPERTENSION: ICD-10-CM

## 2019-04-08 DIAGNOSIS — E11.36 TYPE 2 DIABETES MELLITUS WITH DIABETIC CATARACT, WITH LONG-TERM CURRENT USE OF INSULIN: Primary | ICD-10-CM

## 2019-04-08 PROCEDURE — 1101F PT FALLS ASSESS-DOCD LE1/YR: CPT | Mod: CPTII,S$GLB,, | Performed by: NURSE PRACTITIONER

## 2019-04-08 PROCEDURE — 99214 PR OFFICE/OUTPT VISIT, EST, LEVL IV, 30-39 MIN: ICD-10-PCS | Mod: S$GLB,,, | Performed by: NURSE PRACTITIONER

## 2019-04-08 PROCEDURE — 99214 OFFICE O/P EST MOD 30 MIN: CPT | Mod: S$GLB,,, | Performed by: NURSE PRACTITIONER

## 2019-04-08 PROCEDURE — 3044F HG A1C LEVEL LT 7.0%: CPT | Mod: CPTII,S$GLB,, | Performed by: NURSE PRACTITIONER

## 2019-04-08 PROCEDURE — 1101F PR PT FALLS ASSESS DOC 0-1 FALLS W/OUT INJ PAST YR: ICD-10-PCS | Mod: CPTII,S$GLB,, | Performed by: NURSE PRACTITIONER

## 2019-04-08 PROCEDURE — 99999 PR PBB SHADOW E&M-EST. PATIENT-LVL V: ICD-10-PCS | Mod: PBBFAC,,, | Performed by: NURSE PRACTITIONER

## 2019-04-08 PROCEDURE — 99999 PR PBB SHADOW E&M-EST. PATIENT-LVL V: CPT | Mod: PBBFAC,,, | Performed by: NURSE PRACTITIONER

## 2019-04-08 PROCEDURE — 3079F DIAST BP 80-89 MM HG: CPT | Mod: CPTII,S$GLB,, | Performed by: NURSE PRACTITIONER

## 2019-04-08 PROCEDURE — 3079F PR MOST RECENT DIASTOLIC BLOOD PRESSURE 80-89 MM HG: ICD-10-PCS | Mod: CPTII,S$GLB,, | Performed by: NURSE PRACTITIONER

## 2019-04-08 PROCEDURE — 3077F PR MOST RECENT SYSTOLIC BLOOD PRESSURE >= 140 MM HG: ICD-10-PCS | Mod: CPTII,S$GLB,, | Performed by: NURSE PRACTITIONER

## 2019-04-08 PROCEDURE — 3044F PR MOST RECENT HEMOGLOBIN A1C LEVEL <7.0%: ICD-10-PCS | Mod: CPTII,S$GLB,, | Performed by: NURSE PRACTITIONER

## 2019-04-08 PROCEDURE — 3077F SYST BP >= 140 MM HG: CPT | Mod: CPTII,S$GLB,, | Performed by: NURSE PRACTITIONER

## 2019-09-27 PROBLEM — Z79.4 CONTROLLED TYPE 2 DIABETES MELLITUS WITH BOTH EYES AFFECTED BY MILD NONPROLIFERATIVE RETINOPATHY WITHOUT MACULAR EDEMA, WITH LONG-TERM CURRENT USE OF INSULIN: Status: ACTIVE | Noted: 2019-09-27

## 2019-09-27 PROBLEM — E11.3293 CONTROLLED TYPE 2 DIABETES MELLITUS WITH BOTH EYES AFFECTED BY MILD NONPROLIFERATIVE RETINOPATHY WITHOUT MACULAR EDEMA, WITH LONG-TERM CURRENT USE OF INSULIN: Status: ACTIVE | Noted: 2019-09-27

## 2019-10-04 ENCOUNTER — LAB VISIT (OUTPATIENT)
Dept: LAB | Facility: HOSPITAL | Age: 74
End: 2019-10-04
Attending: INTERNAL MEDICINE
Payer: MEDICARE

## 2019-10-04 DIAGNOSIS — E11.36 TYPE 2 DIABETES MELLITUS WITH DIABETIC CATARACT, WITH LONG-TERM CURRENT USE OF INSULIN: ICD-10-CM

## 2019-10-04 DIAGNOSIS — Z79.4 TYPE 2 DIABETES MELLITUS WITH DIABETIC CATARACT, WITH LONG-TERM CURRENT USE OF INSULIN: ICD-10-CM

## 2019-10-04 LAB
25(OH)D3+25(OH)D2 SERPL-MCNC: 27 NG/ML (ref 30–96)
ALBUMIN SERPL BCP-MCNC: 3.5 G/DL (ref 3.5–5.2)
ALP SERPL-CCNC: 53 U/L (ref 55–135)
ALT SERPL W/O P-5'-P-CCNC: 6 U/L (ref 10–44)
ANION GAP SERPL CALC-SCNC: 10 MMOL/L (ref 8–16)
AST SERPL-CCNC: 15 U/L (ref 10–40)
BILIRUB SERPL-MCNC: 0.4 MG/DL (ref 0.1–1)
BUN SERPL-MCNC: 17 MG/DL (ref 8–23)
CALCIUM SERPL-MCNC: 9.1 MG/DL (ref 8.7–10.5)
CHLORIDE SERPL-SCNC: 105 MMOL/L (ref 95–110)
CO2 SERPL-SCNC: 26 MMOL/L (ref 23–29)
CREAT SERPL-MCNC: 1.1 MG/DL (ref 0.5–1.4)
EST. GFR  (AFRICAN AMERICAN): 56.8 ML/MIN/1.73 M^2
EST. GFR  (NON AFRICAN AMERICAN): 49.2 ML/MIN/1.73 M^2
ESTIMATED AVG GLUCOSE: 131 MG/DL (ref 68–131)
GLUCOSE SERPL-MCNC: 78 MG/DL (ref 70–110)
HBA1C MFR BLD HPLC: 6.2 % (ref 4–5.6)
POTASSIUM SERPL-SCNC: 3.6 MMOL/L (ref 3.5–5.1)
PROT SERPL-MCNC: 6.9 G/DL (ref 6–8.4)
SODIUM SERPL-SCNC: 141 MMOL/L (ref 136–145)
T4 FREE SERPL-MCNC: 1.1 NG/DL (ref 0.71–1.51)
TSH SERPL DL<=0.005 MIU/L-ACNC: 4.07 UIU/ML (ref 0.4–4)

## 2019-10-04 PROCEDURE — 83036 HEMOGLOBIN GLYCOSYLATED A1C: CPT

## 2019-10-04 PROCEDURE — 80053 COMPREHEN METABOLIC PANEL: CPT

## 2019-10-04 PROCEDURE — 84439 ASSAY OF FREE THYROXINE: CPT

## 2019-10-04 PROCEDURE — 36415 COLL VENOUS BLD VENIPUNCTURE: CPT | Mod: PO

## 2019-10-04 PROCEDURE — 82306 VITAMIN D 25 HYDROXY: CPT

## 2019-10-04 PROCEDURE — 84443 ASSAY THYROID STIM HORMONE: CPT

## 2019-10-07 ENCOUNTER — OFFICE VISIT (OUTPATIENT)
Dept: ENDOCRINOLOGY | Facility: CLINIC | Age: 74
End: 2019-10-07
Payer: MEDICARE

## 2019-10-07 VITALS
HEIGHT: 62 IN | BODY MASS INDEX: 32.15 KG/M2 | HEART RATE: 104 BPM | WEIGHT: 174.69 LBS | SYSTOLIC BLOOD PRESSURE: 130 MMHG | DIASTOLIC BLOOD PRESSURE: 65 MMHG

## 2019-10-07 DIAGNOSIS — E11.3293 CONTROLLED TYPE 2 DIABETES MELLITUS WITH BOTH EYES AFFECTED BY MILD NONPROLIFERATIVE RETINOPATHY WITHOUT MACULAR EDEMA, WITH LONG-TERM CURRENT USE OF INSULIN: Primary | ICD-10-CM

## 2019-10-07 DIAGNOSIS — I10 ESSENTIAL HYPERTENSION: ICD-10-CM

## 2019-10-07 DIAGNOSIS — E03.8 SUBCLINICAL HYPOTHYROIDISM: ICD-10-CM

## 2019-10-07 DIAGNOSIS — Z79.4 CONTROLLED TYPE 2 DIABETES MELLITUS WITH BOTH EYES AFFECTED BY MILD NONPROLIFERATIVE RETINOPATHY WITHOUT MACULAR EDEMA, WITH LONG-TERM CURRENT USE OF INSULIN: Primary | ICD-10-CM

## 2019-10-07 DIAGNOSIS — E55.9 VITAMIN D INSUFFICIENCY: ICD-10-CM

## 2019-10-07 PROCEDURE — 1101F PR PT FALLS ASSESS DOC 0-1 FALLS W/OUT INJ PAST YR: ICD-10-PCS | Mod: CPTII,S$GLB,, | Performed by: NURSE PRACTITIONER

## 2019-10-07 PROCEDURE — 99999 PR PBB SHADOW E&M-EST. PATIENT-LVL V: CPT | Mod: PBBFAC,,, | Performed by: NURSE PRACTITIONER

## 2019-10-07 PROCEDURE — 3075F SYST BP GE 130 - 139MM HG: CPT | Mod: CPTII,S$GLB,, | Performed by: NURSE PRACTITIONER

## 2019-10-07 PROCEDURE — 3078F PR MOST RECENT DIASTOLIC BLOOD PRESSURE < 80 MM HG: ICD-10-PCS | Mod: CPTII,S$GLB,, | Performed by: NURSE PRACTITIONER

## 2019-10-07 PROCEDURE — 99214 PR OFFICE/OUTPT VISIT, EST, LEVL IV, 30-39 MIN: ICD-10-PCS | Mod: S$GLB,,, | Performed by: NURSE PRACTITIONER

## 2019-10-07 PROCEDURE — 99999 PR PBB SHADOW E&M-EST. PATIENT-LVL V: ICD-10-PCS | Mod: PBBFAC,,, | Performed by: NURSE PRACTITIONER

## 2019-10-07 PROCEDURE — 1101F PT FALLS ASSESS-DOCD LE1/YR: CPT | Mod: CPTII,S$GLB,, | Performed by: NURSE PRACTITIONER

## 2019-10-07 PROCEDURE — 3044F HG A1C LEVEL LT 7.0%: CPT | Mod: CPTII,S$GLB,, | Performed by: NURSE PRACTITIONER

## 2019-10-07 PROCEDURE — 99214 OFFICE O/P EST MOD 30 MIN: CPT | Mod: S$GLB,,, | Performed by: NURSE PRACTITIONER

## 2019-10-07 PROCEDURE — 3075F PR MOST RECENT SYSTOLIC BLOOD PRESS GE 130-139MM HG: ICD-10-PCS | Mod: CPTII,S$GLB,, | Performed by: NURSE PRACTITIONER

## 2019-10-07 PROCEDURE — 3078F DIAST BP <80 MM HG: CPT | Mod: CPTII,S$GLB,, | Performed by: NURSE PRACTITIONER

## 2019-10-07 PROCEDURE — 3044F PR MOST RECENT HEMOGLOBIN A1C LEVEL <7.0%: ICD-10-PCS | Mod: CPTII,S$GLB,, | Performed by: NURSE PRACTITIONER

## 2019-10-07 NOTE — PATIENT INSTRUCTIONS
Start Vitamin D3 1,000 IU daily.  Stop Tresiba.  Notify me of blood sugars are consistently over 150.

## 2019-10-07 NOTE — PROGRESS NOTES
"CC: Ms. Swetha Thompson arrives today for management of Type 2 DM and review of chronic medical conditions, as listed in the visit diagnosis section of this encounter.     HPI: Ms. Swetha Thompson was diagnosed with Type 2 DM in her 50s. Initial treatment consisted of oral medications and insulin was added in ~2011. Actos was added by PCP in 2018. Insulin was discontinued in 2018, following hypoglycemia and significant decrease in insulin needs. However, her readings increased >150 mg/dL so she resumed low dose Tresiba. + FH of DM in mother, brother, sister, maternal GM. Past ER visit on 6/26/18 via EMS, due to hypoglycemia in the 30s, which caused loss of consciousness at home. Insulin doses were adjusted at this time.      Patient was last seen by me in April.    BG readings are checked 1x/day (fasting only).             Hypoglycemia: Rare but has noticed some readings in mid 60s.  Symptoms: dizziness  Treatment: glucose tabs or drinks something sweet    Missing Insulin/PO medication doses: No  Timing prandial insulin 5-15 minutes before meals: n/a     Dietary Habits: Eats 2 meals daily. She states that she isn't always hungry. May only eat PB crackers midday instead of lunch. Drinks bottled green tea that she thinks is unsweetened.  B: eggs with toast or grits  D: meat, green beans, potatoes     Last DM education appointment: 5/2017    She states that her PCP asked her to start taking Vitamin D "a long time ago" but she has not been.     CURRENT DIABETIC MEDS: Metformin XR 1000 mg BID, Actos 15 mg daily, Tresiba 10 units QAM  Vial or pen: pen  Glucometer type: unsure    Previous DM meds:  Levemir  Novolog     Last Eye Exam: 9/27/2019, + DR. Dr. Shields.  Last Podiatry Exam: 2017    REVIEW OF SYSTEMS  Constitutional: no c/o weakness, fatigue, weight loss  Eyes: denies visual disturbances.  Cardiac: no chest pain, palpitations.  Respiratory: no dyspnea, cough.  GI: no c/o abdominal pain, nausea. Denies h/o " "pancreatitis.   Skin: no lesions or rashes.  Neuro: no numbness, tingling, paresthesias.   Endocrine: denies polyphagia, polydipsia, polyuria.    Personally reviewed Past Medical, Surgical, Social History.    Vital Signs  /65 (BP Location: Right arm, Patient Position: Sitting, BP Method: Large (Manual))   Pulse 104   Ht 5' 2" (1.575 m)   Wt 79.2 kg (174 lb 11.4 oz)   BMI 31.96 kg/m²      Personally reviewed the below labs:    Hemoglobin A1C   Date Value Ref Range Status   10/04/2019 6.2 (H) 4.0 - 5.6 % Final     Comment:     ADA Screening Guidelines:  5.7-6.4%  Consistent with prediabetes  >or=6.5%  Consistent with diabetes  High levels of fetal hemoglobin interfere with the HbA1C  assay. Heterozygous hemoglobin variants (HbS, HgC, etc)do  not significantly interfere with this assay.   However, presence of multiple variants may affect accuracy.     04/04/2019 6.8 (H) 4.0 - 5.6 % Final     Comment:     ADA Screening Guidelines:  5.7-6.4%  Consistent with prediabetes  >or=6.5%  Consistent with diabetes  High levels of fetal hemoglobin interfere with the HbA1C  assay. Heterozygous hemoglobin variants (HbS, HgC, etc)do  not significantly interfere with this assay.   However, presence of multiple variants may affect accuracy.     12/06/2018 6.9 (H) 4.0 - 5.6 % Final     Comment:     ADA Screening Guidelines:  5.7-6.4%  Consistent with prediabetes  >or=6.5%  Consistent with diabetes  High levels of fetal hemoglobin interfere with the HbA1C  assay. Heterozygous hemoglobin variants (HbS, HgC, etc)do  not significantly interfere with this assay.   However, presence of multiple variants may affect accuracy.         Chemistry        Component Value Date/Time     10/04/2019 0729    K 3.6 10/04/2019 0729     10/04/2019 0729    CO2 26 10/04/2019 0729    BUN 17 10/04/2019 0729    CREATININE 1.1 10/04/2019 0729    GLU 78 10/04/2019 0729        Component Value Date/Time    CALCIUM 9.1 10/04/2019 0729    ALKPHOS " 53 (L) 10/04/2019 0729    AST 15 10/04/2019 0729    ALT 6 (L) 10/04/2019 0729    BILITOT 0.4 10/04/2019 0729    ESTGFRAFRICA 56.8 (A) 10/04/2019 0729    EGFRNONAA 49.2 (A) 10/04/2019 0729          Lab Results   Component Value Date    CHOL 165 12/06/2018    CHOL 179 10/31/2017    CHOL 172 07/20/2017     Lab Results   Component Value Date    HDL 56 12/06/2018    HDL 59 10/31/2017    HDL 59 07/20/2017     Lab Results   Component Value Date    LDLCALC 96.0 12/06/2018    LDLCALC 103.0 10/31/2017    LDLCALC 97.4 07/20/2017     Lab Results   Component Value Date    TRIG 65 12/06/2018    TRIG 85 10/31/2017    TRIG 78 07/20/2017     Lab Results   Component Value Date    CHOLHDL 33.9 12/06/2018    CHOLHDL 33.0 10/31/2017    CHOLHDL 34.3 07/20/2017       Lab Results   Component Value Date    MICALBCREAT 10.8 10/04/2019     Lab Results   Component Value Date    TSH 4.065 (H) 10/04/2019     Estimated Creatinine Clearance: 43.1 mL/min (based on SCr of 1.1 mg/dL).    Vit D, 25-Hydroxy   Date Value Ref Range Status   10/04/2019 27 (L) 30 - 96 ng/mL Final     Comment:     Vitamin D deficiency.........<10 ng/mL                              Vitamin D insufficiency......10-29 ng/mL       Vitamin D sufficiency........> or equal to 30 ng/mL  Vitamin D toxicity............>100 ng/mL          PHYSICAL EXAMINATION  Constitutional: Appears well, no distress.  Neck: Supple, trachea midline; no thyromegaly or nodules.   Respiratory: CTA, even and unlabored.  Cardiovascular: RRR, no murmurs, no carotid bruits. DP pulses  2+ bilaterally; no edema.    Lymph: no cervical or supraclavicular lymphadenopathy  Skin: warm and dry; no lipohypertrophy, or acanthosis nigracans observed.  Neuro: DTR diminished to BUE/2+ BLE. No loss of protective sensation via 10 gm monofilament. Vibratory exam decreased bilaterally.  Feet: appropriate footwear. + dry skin. No open wounds.       A1c target < 7.5%      Assessment/Plan  1. 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 Do not need tight control due to age and past severe hypoglycemia.  -- discontinue Tresiba  -- continue metformin and Actos   -- check BG 1x/day, alternating times, and notify me if BG consistently > 150.    -- Discussed diagnosis of DM, A1c goals, progression of disease, long term complications and tx options.      -- takes ARB, statin   2. Hypertension -- acceptable  -- continue losartan   3. Vitamin D insufficiency  -- start Vitamin D3 1000 IU daily  -- repeat level with RTC   4.  Subclinical hypothyroidism -- asymptomatic  -- will continue to monitor       FOLLOW UP  Follow up in about 4 months (around 2/7/2020).  Patient instructed to bring BG logs to each follow up   Patient encouraged to call for any BG/medication issues, concerns, or questions.    Orders Placed This Encounter   Procedures    Hemoglobin A1c    Basic metabolic panel    TSH    Vitamin D    [facility] DIABETES FOOT EXAM

## 2019-11-14 PROBLEM — Z79.4 CONTROLLED TYPE 2 DIABETES MELLITUS WITH DIABETIC POLYNEUROPATHY, WITH LONG-TERM CURRENT USE OF INSULIN: Status: ACTIVE | Noted: 2019-11-14

## 2019-11-14 PROBLEM — K21.9 GASTROESOPHAGEAL REFLUX DISEASE: Status: ACTIVE | Noted: 2019-11-14

## 2019-11-14 PROBLEM — E11.42 CONTROLLED TYPE 2 DIABETES MELLITUS WITH DIABETIC POLYNEUROPATHY, WITH LONG-TERM CURRENT USE OF INSULIN: Status: ACTIVE | Noted: 2019-11-14

## 2020-02-03 ENCOUNTER — LAB VISIT (OUTPATIENT)
Dept: LAB | Facility: HOSPITAL | Age: 75
End: 2020-02-03
Attending: NURSE PRACTITIONER
Payer: MEDICARE

## 2020-02-03 DIAGNOSIS — E55.9 VITAMIN D INSUFFICIENCY: ICD-10-CM

## 2020-02-03 DIAGNOSIS — E11.3293 CONTROLLED TYPE 2 DIABETES MELLITUS WITH BOTH EYES AFFECTED BY MILD NONPROLIFERATIVE RETINOPATHY WITHOUT MACULAR EDEMA, WITH LONG-TERM CURRENT USE OF INSULIN: ICD-10-CM

## 2020-02-03 DIAGNOSIS — E03.8 SUBCLINICAL HYPOTHYROIDISM: ICD-10-CM

## 2020-02-03 DIAGNOSIS — Z79.4 CONTROLLED TYPE 2 DIABETES MELLITUS WITH BOTH EYES AFFECTED BY MILD NONPROLIFERATIVE RETINOPATHY WITHOUT MACULAR EDEMA, WITH LONG-TERM CURRENT USE OF INSULIN: ICD-10-CM

## 2020-02-03 LAB
ANION GAP SERPL CALC-SCNC: 8 MMOL/L (ref 8–16)
BUN SERPL-MCNC: 17 MG/DL (ref 8–23)
CALCIUM SERPL-MCNC: 9.2 MG/DL (ref 8.7–10.5)
CHLORIDE SERPL-SCNC: 107 MMOL/L (ref 95–110)
CO2 SERPL-SCNC: 27 MMOL/L (ref 23–29)
CREAT SERPL-MCNC: 1.2 MG/DL (ref 0.5–1.4)
EST. GFR  (AFRICAN AMERICAN): 50.7 ML/MIN/1.73 M^2
EST. GFR  (NON AFRICAN AMERICAN): 44 ML/MIN/1.73 M^2
ESTIMATED AVG GLUCOSE: 169 MG/DL (ref 68–131)
GLUCOSE SERPL-MCNC: 156 MG/DL (ref 70–110)
HBA1C MFR BLD HPLC: 7.5 % (ref 4–5.6)
POTASSIUM SERPL-SCNC: 3.8 MMOL/L (ref 3.5–5.1)
SODIUM SERPL-SCNC: 142 MMOL/L (ref 136–145)
TSH SERPL DL<=0.005 MIU/L-ACNC: 2.45 UIU/ML (ref 0.4–4)

## 2020-02-03 PROCEDURE — 83036 HEMOGLOBIN GLYCOSYLATED A1C: CPT

## 2020-02-03 PROCEDURE — 36415 COLL VENOUS BLD VENIPUNCTURE: CPT | Mod: PO

## 2020-02-03 PROCEDURE — 80048 BASIC METABOLIC PNL TOTAL CA: CPT

## 2020-02-03 PROCEDURE — 82306 VITAMIN D 25 HYDROXY: CPT

## 2020-02-03 PROCEDURE — 84443 ASSAY THYROID STIM HORMONE: CPT

## 2020-02-04 LAB — 25(OH)D3+25(OH)D2 SERPL-MCNC: 22 NG/ML (ref 30–96)

## 2020-02-10 ENCOUNTER — OFFICE VISIT (OUTPATIENT)
Dept: ENDOCRINOLOGY | Facility: CLINIC | Age: 75
End: 2020-02-10
Payer: MEDICARE

## 2020-02-10 VITALS
DIASTOLIC BLOOD PRESSURE: 72 MMHG | HEIGHT: 62 IN | HEART RATE: 86 BPM | WEIGHT: 175.69 LBS | SYSTOLIC BLOOD PRESSURE: 142 MMHG | BODY MASS INDEX: 32.33 KG/M2

## 2020-02-10 DIAGNOSIS — E55.9 VITAMIN D INSUFFICIENCY: ICD-10-CM

## 2020-02-10 DIAGNOSIS — E11.3293 TYPE 2 DIABETES MELLITUS WITH BOTH EYES AFFECTED BY MILD NONPROLIFERATIVE RETINOPATHY WITHOUT MACULAR EDEMA, WITH LONG-TERM CURRENT USE OF INSULIN: Primary | ICD-10-CM

## 2020-02-10 DIAGNOSIS — I10 ESSENTIAL HYPERTENSION: ICD-10-CM

## 2020-02-10 DIAGNOSIS — Z79.4 TYPE 2 DIABETES MELLITUS WITH BOTH EYES AFFECTED BY MILD NONPROLIFERATIVE RETINOPATHY WITHOUT MACULAR EDEMA, WITH LONG-TERM CURRENT USE OF INSULIN: Primary | ICD-10-CM

## 2020-02-10 PROCEDURE — 99214 PR OFFICE/OUTPT VISIT, EST, LEVL IV, 30-39 MIN: ICD-10-PCS | Mod: S$GLB,,, | Performed by: NURSE PRACTITIONER

## 2020-02-10 PROCEDURE — 1126F AMNT PAIN NOTED NONE PRSNT: CPT | Mod: S$GLB,,, | Performed by: NURSE PRACTITIONER

## 2020-02-10 PROCEDURE — 99214 OFFICE O/P EST MOD 30 MIN: CPT | Mod: S$GLB,,, | Performed by: NURSE PRACTITIONER

## 2020-02-10 PROCEDURE — 3078F DIAST BP <80 MM HG: CPT | Mod: CPTII,S$GLB,, | Performed by: NURSE PRACTITIONER

## 2020-02-10 PROCEDURE — 99999 PR PBB SHADOW E&M-EST. PATIENT-LVL V: ICD-10-PCS | Mod: PBBFAC,,, | Performed by: NURSE PRACTITIONER

## 2020-02-10 PROCEDURE — 99999 PR PBB SHADOW E&M-EST. PATIENT-LVL V: CPT | Mod: PBBFAC,,, | Performed by: NURSE PRACTITIONER

## 2020-02-10 PROCEDURE — 3051F PR MOST RECENT HEMOGLOBIN A1C LEVEL 7.0 - < 8.0%: ICD-10-PCS | Mod: CPTII,S$GLB,, | Performed by: NURSE PRACTITIONER

## 2020-02-10 PROCEDURE — 1101F PT FALLS ASSESS-DOCD LE1/YR: CPT | Mod: CPTII,S$GLB,, | Performed by: NURSE PRACTITIONER

## 2020-02-10 PROCEDURE — 1159F MED LIST DOCD IN RCRD: CPT | Mod: S$GLB,,, | Performed by: NURSE PRACTITIONER

## 2020-02-10 PROCEDURE — 1126F PR PAIN SEVERITY QUANTIFIED, NO PAIN PRESENT: ICD-10-PCS | Mod: S$GLB,,, | Performed by: NURSE PRACTITIONER

## 2020-02-10 PROCEDURE — 1159F PR MEDICATION LIST DOCUMENTED IN MEDICAL RECORD: ICD-10-PCS | Mod: S$GLB,,, | Performed by: NURSE PRACTITIONER

## 2020-02-10 PROCEDURE — 3051F HG A1C>EQUAL 7.0%<8.0%: CPT | Mod: CPTII,S$GLB,, | Performed by: NURSE PRACTITIONER

## 2020-02-10 PROCEDURE — 1101F PR PT FALLS ASSESS DOC 0-1 FALLS W/OUT INJ PAST YR: ICD-10-PCS | Mod: CPTII,S$GLB,, | Performed by: NURSE PRACTITIONER

## 2020-02-10 PROCEDURE — 3078F PR MOST RECENT DIASTOLIC BLOOD PRESSURE < 80 MM HG: ICD-10-PCS | Mod: CPTII,S$GLB,, | Performed by: NURSE PRACTITIONER

## 2020-02-10 PROCEDURE — 3077F PR MOST RECENT SYSTOLIC BLOOD PRESSURE >= 140 MM HG: ICD-10-PCS | Mod: CPTII,S$GLB,, | Performed by: NURSE PRACTITIONER

## 2020-02-10 PROCEDURE — 3077F SYST BP >= 140 MM HG: CPT | Mod: CPTII,S$GLB,, | Performed by: NURSE PRACTITIONER

## 2020-02-10 RX ORDER — PIOGLITAZONEHYDROCHLORIDE 30 MG/1
TABLET ORAL
Qty: 90 TABLET | Refills: 3 | Status: SHIPPED | OUTPATIENT
Start: 2020-02-10 | End: 2021-02-17 | Stop reason: SDUPTHER

## 2020-02-10 NOTE — PROGRESS NOTES
CC: Ms. Swetha Thompson arrives today for management of Type 2 DM and review of chronic medical conditions, as listed in the visit diagnosis section of this encounter.     HPI: Ms. Swetha Thompson was diagnosed with Type 2 DM in her 50s. Initial treatment consisted of oral medications and insulin was added in ~2011. Actos was added by PCP in 2018. Insulin was discontinued in 2018, following hypoglycemia and significant decrease in insulin needs. However, her readings increased >150 mg/dL so she resumed low dose Tresiba. + FH of DM in mother, brother, sister, maternal GM. Past ER visit on 6/26/18 via EMS, due to hypoglycemia in the 30s, which caused loss of consciousness at home. Insulin doses were adjusted at this time.      Patient was last seen by me in October. At this time, Tresiba was discontinued, due to FBG persistently < 100 mg/dL and occasional hypoglycemia.     Patient states that she had been stressed but reports that her stress has recently lifted. She in takes care of her 1 year old great granddaughter M-F.     BG readings are checked 1x/day (fasting only).              Hypoglycemia: No    Missing Insulin/PO medication doses: No  Timing prandial insulin 5-15 minutes before meals: n/a     Dietary Habits: Eats 3 meals daily. Might eat PB crackers as a snack. Drinks rare regular soda.     Last DM education appointment: 5/2017    Regarding Vitamin D insufficiency, she is taking Vitamin D3 1,000 IU daily.     She reports that her BP usually increases at her office visits.     CURRENT DIABETIC MEDS: Metformin XR 1000 mg BID, Actos 15 mg daily  Vial or pen: pen  Glucometer type: unsure    Previous DM meds:  Levemir  Novolog   Tresiba    Last Eye Exam: 9/27/2019, + DR. Dr. Shields.  Last Podiatry Exam: 2017    REVIEW OF SYSTEMS  Constitutional: no c/o weakness, fatigue, weight loss  Eyes: denies visual disturbances.  Cardiac: no chest pain, palpitations.  Respiratory: no dyspnea, cough.  GI: no c/o  "abdominal pain, nausea. Denies h/o pancreatitis.   : denies incontinence or hematuria.   Skin: no lesions or rashes.  Neuro: no numbness, tingling, paresthesias.   Endocrine: denies polyphagia, polydipsia, polyuria.     Personally reviewed Past Medical, Surgical, Social History.    Vital Signs  BP (!) 142/72   Pulse 86   Ht 5' 2" (1.575 m)   Wt 79.7 kg (175 lb 11.3 oz)   BMI 32.14 kg/m²      Personally reviewed the below labs:    Hemoglobin A1C   Date Value Ref Range Status   02/03/2020 7.5 (H) 4.0 - 5.6 % Final     Comment:     ADA Screening Guidelines:  5.7-6.4%  Consistent with prediabetes  >or=6.5%  Consistent with diabetes  High levels of fetal hemoglobin interfere with the HbA1C  assay. Heterozygous hemoglobin variants (HbS, HgC, etc)do  not significantly interfere with this assay.   However, presence of multiple variants may affect accuracy.     10/04/2019 6.2 (H) 4.0 - 5.6 % Final     Comment:     ADA Screening Guidelines:  5.7-6.4%  Consistent with prediabetes  >or=6.5%  Consistent with diabetes  High levels of fetal hemoglobin interfere with the HbA1C  assay. Heterozygous hemoglobin variants (HbS, HgC, etc)do  not significantly interfere with this assay.   However, presence of multiple variants may affect accuracy.     04/04/2019 6.8 (H) 4.0 - 5.6 % Final     Comment:     ADA Screening Guidelines:  5.7-6.4%  Consistent with prediabetes  >or=6.5%  Consistent with diabetes  High levels of fetal hemoglobin interfere with the HbA1C  assay. Heterozygous hemoglobin variants (HbS, HgC, etc)do  not significantly interfere with this assay.   However, presence of multiple variants may affect accuracy.         Chemistry        Component Value Date/Time     02/03/2020 0955    K 3.8 02/03/2020 0955     02/03/2020 0955    CO2 27 02/03/2020 0955    BUN 17 02/03/2020 0955    CREATININE 1.2 02/03/2020 0955     (H) 02/03/2020 0955        Component Value Date/Time    CALCIUM 9.2 02/03/2020 0955    " ALKPHOS 53 (L) 10/04/2019 0729    AST 15 10/04/2019 0729    ALT 6 (L) 10/04/2019 0729    BILITOT 0.4 10/04/2019 0729    ESTGFRAFRICA 50.7 (A) 02/03/2020 0955    EGFRNONAA 44.0 (A) 02/03/2020 0955          Lab Results   Component Value Date    CHOL 165 12/06/2018    CHOL 179 10/31/2017    CHOL 172 07/20/2017     Lab Results   Component Value Date    HDL 56 12/06/2018    HDL 59 10/31/2017    HDL 59 07/20/2017     Lab Results   Component Value Date    LDLCALC 96.0 12/06/2018    LDLCALC 103.0 10/31/2017    LDLCALC 97.4 07/20/2017     Lab Results   Component Value Date    TRIG 65 12/06/2018    TRIG 85 10/31/2017    TRIG 78 07/20/2017     Lab Results   Component Value Date    CHOLHDL 33.9 12/06/2018    CHOLHDL 33.0 10/31/2017    CHOLHDL 34.3 07/20/2017       Lab Results   Component Value Date    MICALBCREAT 10.8 10/04/2019     Lab Results   Component Value Date    TSH 2.447 02/03/2020     CrCl cannot be calculated (Unknown ideal weight.).    Vit D, 25-Hydroxy   Date Value Ref Range Status   02/03/2020 22 (L) 30 - 96 ng/mL Final     Comment:     Vitamin D deficiency.........<10 ng/mL                              Vitamin D insufficiency......10-29 ng/mL       Vitamin D sufficiency........> or equal to 30 ng/mL  Vitamin D toxicity............>100 ng/mL          PHYSICAL EXAMINATION  Constitutional: Appears well, no distress.  Neck: Supple, trachea midline; no thyromegaly or nodules.   Respiratory: CTA, even and unlabored.  Cardiovascular: RRR, no murmurs, no carotid bruits. DP pulses  2+ bilaterally; no edema.    Lymph: no cervical or supraclavicular lymphadenopathy  Skin: warm and dry; no lipohypertrophy, or acanthosis nigracans observed.  Neuro: DTR diminished to BUE/1+ BLE. Previously, no loss of protective sensation via 10 gm monofilament. Vibratory exam decreased bilaterally.  Feet: appropriate footwear.       A1c target < 7.5%      Assessment/Plan  1. Type 2 diabetes mellitus with both eyes affected by mild  nonproliferative diabetic retinopathy without macular edema, with long-term current use of insulin  -- Acceptable control given individualized A1c target. Do not need tight control due to age and past severe hypoglycemia. Previously with hypoglycemia on low dose Tresiba (10 units). Before adding back insulin will increase pioglitazone dose.   -- increase pioglitazone to 30 mg daily.  -- continue metformin  -- check BG 1x/day, alternating times.    -- Discussed diagnosis of DM, A1c goals, progression of disease, long term complications and tx options.      -- takes ARB, statin   2. Hypertension -- mildly elevated today but previously normal.   -- continue losartan and monitor    3. Vitamin D insufficiency  -- increase Vitamin D3 to 2.000 IU daily  -- repeat level with RTC       FOLLOW UP  Follow up in about 3 months (around 5/10/2020).  Patient instructed to bring BG logs to each follow up   Patient encouraged to call for any BG/medication issues, concerns, or questions.    Orders Placed This Encounter   Procedures    Hemoglobin A1c    Lipid panel    Comprehensive metabolic panel    Vitamin D

## 2020-03-16 PROBLEM — B18.1 CHRONIC HEPATITIS B: Status: ACTIVE | Noted: 2020-03-16

## 2020-05-15 ENCOUNTER — LAB VISIT (OUTPATIENT)
Dept: LAB | Facility: HOSPITAL | Age: 75
End: 2020-05-15
Attending: NURSE PRACTITIONER
Payer: MEDICARE

## 2020-05-15 DIAGNOSIS — Z79.4 TYPE 2 DIABETES MELLITUS WITH BOTH EYES AFFECTED BY MILD NONPROLIFERATIVE RETINOPATHY WITHOUT MACULAR EDEMA, WITH LONG-TERM CURRENT USE OF INSULIN: ICD-10-CM

## 2020-05-15 DIAGNOSIS — E55.9 VITAMIN D INSUFFICIENCY: ICD-10-CM

## 2020-05-15 DIAGNOSIS — E11.3293 TYPE 2 DIABETES MELLITUS WITH BOTH EYES AFFECTED BY MILD NONPROLIFERATIVE RETINOPATHY WITHOUT MACULAR EDEMA, WITH LONG-TERM CURRENT USE OF INSULIN: ICD-10-CM

## 2020-05-15 LAB
25(OH)D3+25(OH)D2 SERPL-MCNC: 40 NG/ML (ref 30–96)
ALBUMIN SERPL BCP-MCNC: 3.2 G/DL (ref 3.5–5.2)
ALP SERPL-CCNC: 52 U/L (ref 55–135)
ALT SERPL W/O P-5'-P-CCNC: 9 U/L (ref 10–44)
ANION GAP SERPL CALC-SCNC: 7 MMOL/L (ref 8–16)
AST SERPL-CCNC: 17 U/L (ref 10–40)
BILIRUB SERPL-MCNC: 0.5 MG/DL (ref 0.1–1)
BUN SERPL-MCNC: 21 MG/DL (ref 8–23)
CALCIUM SERPL-MCNC: 8.9 MG/DL (ref 8.7–10.5)
CHLORIDE SERPL-SCNC: 105 MMOL/L (ref 95–110)
CHOLEST SERPL-MCNC: 189 MG/DL (ref 120–199)
CHOLEST/HDLC SERPL: 3 {RATIO} (ref 2–5)
CO2 SERPL-SCNC: 28 MMOL/L (ref 23–29)
CREAT SERPL-MCNC: 1.4 MG/DL (ref 0.5–1.4)
EST. GFR  (AFRICAN AMERICAN): 42.1 ML/MIN/1.73 M^2
EST. GFR  (NON AFRICAN AMERICAN): 36.5 ML/MIN/1.73 M^2
ESTIMATED AVG GLUCOSE: 151 MG/DL (ref 68–131)
GLUCOSE SERPL-MCNC: 140 MG/DL (ref 70–110)
HBA1C MFR BLD HPLC: 6.9 % (ref 4–5.6)
HDLC SERPL-MCNC: 62 MG/DL (ref 40–75)
HDLC SERPL: 32.8 % (ref 20–50)
LDLC SERPL CALC-MCNC: 116.2 MG/DL (ref 63–159)
NONHDLC SERPL-MCNC: 127 MG/DL
POTASSIUM SERPL-SCNC: 3.8 MMOL/L (ref 3.5–5.1)
PROT SERPL-MCNC: 6.7 G/DL (ref 6–8.4)
SODIUM SERPL-SCNC: 140 MMOL/L (ref 136–145)
TRIGL SERPL-MCNC: 54 MG/DL (ref 30–150)

## 2020-05-15 PROCEDURE — 80061 LIPID PANEL: CPT

## 2020-05-15 PROCEDURE — 80053 COMPREHEN METABOLIC PANEL: CPT

## 2020-05-15 PROCEDURE — 36415 COLL VENOUS BLD VENIPUNCTURE: CPT | Mod: PO

## 2020-05-15 PROCEDURE — 82306 VITAMIN D 25 HYDROXY: CPT

## 2020-05-15 PROCEDURE — 83036 HEMOGLOBIN GLYCOSYLATED A1C: CPT

## 2020-06-10 ENCOUNTER — OFFICE VISIT (OUTPATIENT)
Dept: ENDOCRINOLOGY | Facility: CLINIC | Age: 75
End: 2020-06-10
Payer: MEDICARE

## 2020-06-10 VITALS
SYSTOLIC BLOOD PRESSURE: 126 MMHG | WEIGHT: 171.31 LBS | HEART RATE: 106 BPM | HEIGHT: 62 IN | BODY MASS INDEX: 31.52 KG/M2 | DIASTOLIC BLOOD PRESSURE: 74 MMHG

## 2020-06-10 DIAGNOSIS — I10 ESSENTIAL HYPERTENSION: ICD-10-CM

## 2020-06-10 DIAGNOSIS — E55.9 VITAMIN D INSUFFICIENCY: ICD-10-CM

## 2020-06-10 DIAGNOSIS — E11.42 CONTROLLED TYPE 2 DIABETES MELLITUS WITH DIABETIC POLYNEUROPATHY, WITHOUT LONG-TERM CURRENT USE OF INSULIN: ICD-10-CM

## 2020-06-10 DIAGNOSIS — E11.3293 TYPE 2 DIABETES MELLITUS WITH BOTH EYES AFFECTED BY MILD NONPROLIFERATIVE RETINOPATHY WITHOUT MACULAR EDEMA, WITHOUT LONG-TERM CURRENT USE OF INSULIN: Primary | ICD-10-CM

## 2020-06-10 PROCEDURE — 1159F MED LIST DOCD IN RCRD: CPT | Mod: S$GLB,,, | Performed by: NURSE PRACTITIONER

## 2020-06-10 PROCEDURE — 1126F AMNT PAIN NOTED NONE PRSNT: CPT | Mod: S$GLB,,, | Performed by: NURSE PRACTITIONER

## 2020-06-10 PROCEDURE — 3074F PR MOST RECENT SYSTOLIC BLOOD PRESSURE < 130 MM HG: ICD-10-PCS | Mod: CPTII,S$GLB,, | Performed by: NURSE PRACTITIONER

## 2020-06-10 PROCEDURE — 3078F PR MOST RECENT DIASTOLIC BLOOD PRESSURE < 80 MM HG: ICD-10-PCS | Mod: CPTII,S$GLB,, | Performed by: NURSE PRACTITIONER

## 2020-06-10 PROCEDURE — 99214 PR OFFICE/OUTPT VISIT, EST, LEVL IV, 30-39 MIN: ICD-10-PCS | Mod: S$GLB,,, | Performed by: NURSE PRACTITIONER

## 2020-06-10 PROCEDURE — 3074F SYST BP LT 130 MM HG: CPT | Mod: CPTII,S$GLB,, | Performed by: NURSE PRACTITIONER

## 2020-06-10 PROCEDURE — 99999 PR PBB SHADOW E&M-EST. PATIENT-LVL IV: ICD-10-PCS | Mod: PBBFAC,,, | Performed by: NURSE PRACTITIONER

## 2020-06-10 PROCEDURE — 1101F PT FALLS ASSESS-DOCD LE1/YR: CPT | Mod: CPTII,S$GLB,, | Performed by: NURSE PRACTITIONER

## 2020-06-10 PROCEDURE — 1159F PR MEDICATION LIST DOCUMENTED IN MEDICAL RECORD: ICD-10-PCS | Mod: S$GLB,,, | Performed by: NURSE PRACTITIONER

## 2020-06-10 PROCEDURE — 1126F PR PAIN SEVERITY QUANTIFIED, NO PAIN PRESENT: ICD-10-PCS | Mod: S$GLB,,, | Performed by: NURSE PRACTITIONER

## 2020-06-10 PROCEDURE — 3078F DIAST BP <80 MM HG: CPT | Mod: CPTII,S$GLB,, | Performed by: NURSE PRACTITIONER

## 2020-06-10 PROCEDURE — 99999 PR PBB SHADOW E&M-EST. PATIENT-LVL IV: CPT | Mod: PBBFAC,,, | Performed by: NURSE PRACTITIONER

## 2020-06-10 PROCEDURE — 1101F PR PT FALLS ASSESS DOC 0-1 FALLS W/OUT INJ PAST YR: ICD-10-PCS | Mod: CPTII,S$GLB,, | Performed by: NURSE PRACTITIONER

## 2020-06-10 PROCEDURE — 99214 OFFICE O/P EST MOD 30 MIN: CPT | Mod: S$GLB,,, | Performed by: NURSE PRACTITIONER

## 2020-06-10 NOTE — PROGRESS NOTES
CC: Ms. Swetha Thompson arrives today for management of Type 2 DM and review of chronic medical conditions, as listed in the visit diagnosis section of this encounter.     HPI: Ms. Swetha Thompson was diagnosed with Type 2 DM in her 50s. Initial treatment consisted of oral medications and insulin was added in ~2011. Actos was added by PCP in 2018. Insulin was discontinued in 2018, following hypoglycemia and significant decrease in insulin needs. However, her readings increased >150 mg/dL so she resumed low dose Tresiba. + FH of DM in mother, brother, sister, maternal GM. Past ER visit on 6/26/18 via EMS, due to hypoglycemia in the 30s, which caused loss of consciousness at home. Insulin doses were adjusted at this time. Tresiba later discontinued in 2019, due to hypoglycemia on low dose of 10 units daily.      Patient was last seen by me in February. At this time, pioglitazone dose was increased.     BG readings are checked 1x/day (fasting only).            Hypoglycemia: No    Missing Insulin/PO medication doses: No  Timing prandial insulin 5-15 minutes before meals: n/a     Dietary Habits: Eats 3 meals daily. Might snack on PB, jelly sandwich on 2 slices of bread. Drinks rare regular soda.     Last DM education appointment: 5/2017    Regarding Vitamin D insufficiency, she continues on Vitamin D3 2,000 IU daily.     CURRENT DIABETIC MEDS: Metformin XR 1000 mg BID, Actos 30 mg daily  Vial or pen: pen  Glucometer type: unsure    Previous DM meds:  Levemir  Novolog   Tresiba    Last Eye Exam: 9/27/2019, + DR. Dr. Shields.  Last Podiatry Exam: 2017    REVIEW OF SYSTEMS  Constitutional: no c/o weakness, fatigue, weight loss  Eyes: denies visual disturbances.  Cardiac: no chest pain, palpitations. Denies edema.  Respiratory: no dyspnea, cough.  GI: no c/o abdominal pain, nausea. Denies h/o pancreatitis.   : denies incontinence or hematuria.   Skin: no lesions or rashes.  Neuro: no numbness, tingling,  "paresthesias.   Endocrine: denies polyphagia, polydipsia, polyuria.     Personally reviewed Past Medical, Surgical, Social History.    Vital Signs  /74   Pulse 106   Ht 5' 2" (1.575 m)   Wt 77.7 kg (171 lb 4.8 oz)   BMI 31.33 kg/m²      Personally reviewed the below labs:    Hemoglobin A1C   Date Value Ref Range Status   05/15/2020 6.9 (H) 4.0 - 5.6 % Final     Comment:     ADA Screening Guidelines:  5.7-6.4%  Consistent with prediabetes  >or=6.5%  Consistent with diabetes  High levels of fetal hemoglobin interfere with the HbA1C  assay. Heterozygous hemoglobin variants (HbS, HgC, etc)do  not significantly interfere with this assay.   However, presence of multiple variants may affect accuracy.     02/03/2020 7.5 (H) 4.0 - 5.6 % Final     Comment:     ADA Screening Guidelines:  5.7-6.4%  Consistent with prediabetes  >or=6.5%  Consistent with diabetes  High levels of fetal hemoglobin interfere with the HbA1C  assay. Heterozygous hemoglobin variants (HbS, HgC, etc)do  not significantly interfere with this assay.   However, presence of multiple variants may affect accuracy.     10/04/2019 6.2 (H) 4.0 - 5.6 % Final     Comment:     ADA Screening Guidelines:  5.7-6.4%  Consistent with prediabetes  >or=6.5%  Consistent with diabetes  High levels of fetal hemoglobin interfere with the HbA1C  assay. Heterozygous hemoglobin variants (HbS, HgC, etc)do  not significantly interfere with this assay.   However, presence of multiple variants may affect accuracy.         Chemistry        Component Value Date/Time     05/15/2020 0748    K 3.8 05/15/2020 0748     05/15/2020 0748    CO2 28 05/15/2020 0748    BUN 21 05/15/2020 0748    CREATININE 1.4 05/15/2020 0748     (H) 05/15/2020 0748        Component Value Date/Time    CALCIUM 8.9 05/15/2020 0748    ALKPHOS 52 (L) 05/15/2020 0748    AST 17 05/15/2020 0748    ALT 9 (L) 05/15/2020 0748    BILITOT 0.5 05/15/2020 0748    ESTGFRAFRICA 42.1 (A) 05/15/2020 " 0748    EGFRNONAA 36.5 (A) 05/15/2020 0748          Lab Results   Component Value Date    CHOL 189 05/15/2020    CHOL 165 12/06/2018    CHOL 179 10/31/2017     Lab Results   Component Value Date    HDL 62 05/15/2020    HDL 56 12/06/2018    HDL 59 10/31/2017     Lab Results   Component Value Date    LDLCALC 116.2 05/15/2020    LDLCALC 96.0 12/06/2018    LDLCALC 103.0 10/31/2017     Lab Results   Component Value Date    TRIG 54 05/15/2020    TRIG 65 12/06/2018    TRIG 85 10/31/2017     Lab Results   Component Value Date    CHOLHDL 32.8 05/15/2020    CHOLHDL 33.9 12/06/2018    CHOLHDL 33.0 10/31/2017       Lab Results   Component Value Date    MICALBCREAT 10.8 10/04/2019     Lab Results   Component Value Date    TSH 2.447 02/03/2020     CrCl cannot be calculated (Patient's most recent lab result is older than the maximum 7 days allowed.).    Vit D, 25-Hydroxy   Date Value Ref Range Status   05/15/2020 40 30 - 96 ng/mL Final     Comment:     Vitamin D deficiency.........<10 ng/mL                              Vitamin D insufficiency......10-29 ng/mL       Vitamin D sufficiency........> or equal to 30 ng/mL  Vitamin D toxicity............>100 ng/mL          PHYSICAL EXAMINATION  Constitutional: Appears well, no distress.  Neck: Supple, trachea midline; no thyromegaly or nodules.   Respiratory: CTA, even and unlabored.  Cardiovascular: RRR, no murmurs, no carotid bruits. DP pulses  2+ bilaterally; no edema.    GI: active bowel sounds, no hernia  Skin: warm and dry; no lipohypertrophy, or acanthosis nigracans observed.  Neuro: DTR diminished to BUE/1+ BLE. Previously, no loss of protective sensation via 10 gm monofilament. Vibratory exam decreased bilaterally.  Feet: appropriate footwear.       A1c target < 7.5%      Assessment/Plan  1. 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 Do not need  tight control due to age and past severe hypoglycemia. Previously with hypoglycemia on low dose Tresiba (10 units).   -- continue metformin, pioglitazone  -- check BG 1x/day, alternating times.    -- Discussed diagnosis of DM, A1c goals, progression of disease, long term complications and tx options.      -- takes ARB, statin   2. Type 2 diabetes mellitus with diabetic neuropathy -- maintain DM control.    3. Hypertension -- controlled  -- continue losartan   4. Vitamin D insufficiency  -- stable  -- continue Vitamin D3 2.000 IU daily       FOLLOW UP  Follow up in about 4 months (around 10/10/2020).  Patient instructed to bring BG logs to each follow up   Patient encouraged to call for any BG/medication issues, concerns, or questions.    Orders Placed This Encounter   Procedures    Hemoglobin A1C    Basic metabolic panel    Microalbumin/creatinine urine ratio

## 2020-09-29 LAB
LEFT EYE DM RETINOPATHY: POSITIVE
RIGHT EYE DM RETINOPATHY: POSITIVE

## 2020-10-05 ENCOUNTER — LAB VISIT (OUTPATIENT)
Dept: LAB | Facility: HOSPITAL | Age: 75
End: 2020-10-05
Attending: NURSE PRACTITIONER
Payer: MEDICARE

## 2020-10-05 DIAGNOSIS — E11.3293 TYPE 2 DIABETES MELLITUS WITH BOTH EYES AFFECTED BY MILD NONPROLIFERATIVE RETINOPATHY WITHOUT MACULAR EDEMA, WITHOUT LONG-TERM CURRENT USE OF INSULIN: ICD-10-CM

## 2020-10-05 LAB
ESTIMATED AVG GLUCOSE: 169 MG/DL (ref 68–131)
HBA1C MFR BLD HPLC: 7.5 % (ref 4–5.6)

## 2020-10-05 PROCEDURE — 80048 BASIC METABOLIC PNL TOTAL CA: CPT

## 2020-10-05 PROCEDURE — 83036 HEMOGLOBIN GLYCOSYLATED A1C: CPT

## 2020-10-05 PROCEDURE — 36415 COLL VENOUS BLD VENIPUNCTURE: CPT | Mod: PO

## 2020-10-06 LAB
ANION GAP SERPL CALC-SCNC: 9 MMOL/L (ref 8–16)
BUN SERPL-MCNC: 14 MG/DL (ref 8–23)
CALCIUM SERPL-MCNC: 8.8 MG/DL (ref 8.7–10.5)
CHLORIDE SERPL-SCNC: 104 MMOL/L (ref 95–110)
CO2 SERPL-SCNC: 26 MMOL/L (ref 23–29)
CREAT SERPL-MCNC: 1 MG/DL (ref 0.5–1.4)
EST. GFR  (AFRICAN AMERICAN): >60 ML/MIN/1.73 M^2
EST. GFR  (NON AFRICAN AMERICAN): 54.9 ML/MIN/1.73 M^2
GLUCOSE SERPL-MCNC: 151 MG/DL (ref 70–110)
POTASSIUM SERPL-SCNC: 4 MMOL/L (ref 3.5–5.1)
SODIUM SERPL-SCNC: 139 MMOL/L (ref 136–145)

## 2020-10-12 ENCOUNTER — TELEPHONE (OUTPATIENT)
Dept: ENDOCRINOLOGY | Facility: CLINIC | Age: 75
End: 2020-10-12

## 2020-10-12 ENCOUNTER — OFFICE VISIT (OUTPATIENT)
Dept: ENDOCRINOLOGY | Facility: CLINIC | Age: 75
End: 2020-10-12
Payer: MEDICARE

## 2020-10-12 VITALS
BODY MASS INDEX: 29.33 KG/M2 | HEIGHT: 62 IN | WEIGHT: 159.38 LBS | DIASTOLIC BLOOD PRESSURE: 68 MMHG | SYSTOLIC BLOOD PRESSURE: 122 MMHG | HEART RATE: 104 BPM

## 2020-10-12 DIAGNOSIS — I10 ESSENTIAL HYPERTENSION: ICD-10-CM

## 2020-10-12 DIAGNOSIS — E11.42 CONTROLLED TYPE 2 DIABETES MELLITUS WITH DIABETIC POLYNEUROPATHY, WITHOUT LONG-TERM CURRENT USE OF INSULIN: ICD-10-CM

## 2020-10-12 DIAGNOSIS — E11.3293 TYPE 2 DIABETES MELLITUS WITH BOTH EYES AFFECTED BY MILD NONPROLIFERATIVE RETINOPATHY WITHOUT MACULAR EDEMA, WITHOUT LONG-TERM CURRENT USE OF INSULIN: Primary | ICD-10-CM

## 2020-10-12 DIAGNOSIS — E55.9 VITAMIN D INSUFFICIENCY: ICD-10-CM

## 2020-10-12 PROCEDURE — 3074F SYST BP LT 130 MM HG: CPT | Mod: CPTII,S$GLB,, | Performed by: NURSE PRACTITIONER

## 2020-10-12 PROCEDURE — 3078F DIAST BP <80 MM HG: CPT | Mod: CPTII,S$GLB,, | Performed by: NURSE PRACTITIONER

## 2020-10-12 PROCEDURE — 3051F PR MOST RECENT HEMOGLOBIN A1C LEVEL 7.0 - < 8.0%: ICD-10-PCS | Mod: CPTII,S$GLB,, | Performed by: NURSE PRACTITIONER

## 2020-10-12 PROCEDURE — 1125F PR PAIN SEVERITY QUANTIFIED, PAIN PRESENT: ICD-10-PCS | Mod: S$GLB,,, | Performed by: NURSE PRACTITIONER

## 2020-10-12 PROCEDURE — 1159F MED LIST DOCD IN RCRD: CPT | Mod: S$GLB,,, | Performed by: NURSE PRACTITIONER

## 2020-10-12 PROCEDURE — 1159F PR MEDICATION LIST DOCUMENTED IN MEDICAL RECORD: ICD-10-PCS | Mod: S$GLB,,, | Performed by: NURSE PRACTITIONER

## 2020-10-12 PROCEDURE — 3051F HG A1C>EQUAL 7.0%<8.0%: CPT | Mod: CPTII,S$GLB,, | Performed by: NURSE PRACTITIONER

## 2020-10-12 PROCEDURE — 1125F AMNT PAIN NOTED PAIN PRSNT: CPT | Mod: S$GLB,,, | Performed by: NURSE PRACTITIONER

## 2020-10-12 PROCEDURE — 99214 OFFICE O/P EST MOD 30 MIN: CPT | Mod: S$GLB,,, | Performed by: NURSE PRACTITIONER

## 2020-10-12 PROCEDURE — 99999 PR PBB SHADOW E&M-EST. PATIENT-LVL III: ICD-10-PCS | Mod: PBBFAC,,, | Performed by: NURSE PRACTITIONER

## 2020-10-12 PROCEDURE — 3078F PR MOST RECENT DIASTOLIC BLOOD PRESSURE < 80 MM HG: ICD-10-PCS | Mod: CPTII,S$GLB,, | Performed by: NURSE PRACTITIONER

## 2020-10-12 PROCEDURE — 1101F PR PT FALLS ASSESS DOC 0-1 FALLS W/OUT INJ PAST YR: ICD-10-PCS | Mod: CPTII,S$GLB,, | Performed by: NURSE PRACTITIONER

## 2020-10-12 PROCEDURE — 1101F PT FALLS ASSESS-DOCD LE1/YR: CPT | Mod: CPTII,S$GLB,, | Performed by: NURSE PRACTITIONER

## 2020-10-12 PROCEDURE — 99999 PR PBB SHADOW E&M-EST. PATIENT-LVL III: CPT | Mod: PBBFAC,,, | Performed by: NURSE PRACTITIONER

## 2020-10-12 PROCEDURE — 99214 PR OFFICE/OUTPT VISIT, EST, LEVL IV, 30-39 MIN: ICD-10-PCS | Mod: S$GLB,,, | Performed by: NURSE PRACTITIONER

## 2020-10-12 PROCEDURE — 3074F PR MOST RECENT SYSTOLIC BLOOD PRESSURE < 130 MM HG: ICD-10-PCS | Mod: CPTII,S$GLB,, | Performed by: NURSE PRACTITIONER

## 2020-10-12 NOTE — TELEPHONE ENCOUNTER
S/w nurse @ Dr. Shields's office. They will be faxing last eye report to [facility]
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
## 2020-10-21 ENCOUNTER — PATIENT OUTREACH (OUTPATIENT)
Dept: ADMINISTRATIVE | Facility: HOSPITAL | Age: 75
End: 2020-10-21

## 2021-02-10 ENCOUNTER — LAB VISIT (OUTPATIENT)
Dept: LAB | Facility: HOSPITAL | Age: 76
End: 2021-02-10
Attending: NURSE PRACTITIONER
Payer: MEDICARE

## 2021-02-10 DIAGNOSIS — E11.3293 TYPE 2 DIABETES MELLITUS WITH BOTH EYES AFFECTED BY MILD NONPROLIFERATIVE RETINOPATHY WITHOUT MACULAR EDEMA, WITHOUT LONG-TERM CURRENT USE OF INSULIN: ICD-10-CM

## 2021-02-17 ENCOUNTER — OFFICE VISIT (OUTPATIENT)
Dept: ENDOCRINOLOGY | Facility: CLINIC | Age: 76
End: 2021-02-17
Payer: MEDICARE

## 2021-02-17 VITALS
BODY MASS INDEX: 30.83 KG/M2 | HEIGHT: 62 IN | WEIGHT: 167.56 LBS | DIASTOLIC BLOOD PRESSURE: 70 MMHG | HEART RATE: 93 BPM | SYSTOLIC BLOOD PRESSURE: 138 MMHG

## 2021-02-17 DIAGNOSIS — E55.9 VITAMIN D INSUFFICIENCY: ICD-10-CM

## 2021-02-17 DIAGNOSIS — E11.42 CONTROLLED TYPE 2 DIABETES MELLITUS WITH DIABETIC POLYNEUROPATHY, WITHOUT LONG-TERM CURRENT USE OF INSULIN: ICD-10-CM

## 2021-02-17 DIAGNOSIS — I10 ESSENTIAL HYPERTENSION: ICD-10-CM

## 2021-02-17 DIAGNOSIS — Z79.4 TYPE 2 DIABETES MELLITUS WITH BOTH EYES AFFECTED BY MILD NONPROLIFERATIVE RETINOPATHY WITHOUT MACULAR EDEMA, WITH LONG-TERM CURRENT USE OF INSULIN: ICD-10-CM

## 2021-02-17 DIAGNOSIS — E11.3293 TYPE 2 DIABETES MELLITUS WITH BOTH EYES AFFECTED BY MILD NONPROLIFERATIVE RETINOPATHY WITHOUT MACULAR EDEMA, WITH LONG-TERM CURRENT USE OF INSULIN: ICD-10-CM

## 2021-02-17 DIAGNOSIS — E11.3293 TYPE 2 DIABETES MELLITUS WITH BOTH EYES AFFECTED BY MILD NONPROLIFERATIVE RETINOPATHY WITHOUT MACULAR EDEMA, WITHOUT LONG-TERM CURRENT USE OF INSULIN: Primary | ICD-10-CM

## 2021-02-17 PROCEDURE — 99999 PR PBB SHADOW E&M-EST. PATIENT-LVL III: ICD-10-PCS | Mod: PBBFAC,,, | Performed by: NURSE PRACTITIONER

## 2021-02-17 PROCEDURE — 1126F AMNT PAIN NOTED NONE PRSNT: CPT | Mod: S$GLB,,, | Performed by: NURSE PRACTITIONER

## 2021-02-17 PROCEDURE — 99214 OFFICE O/P EST MOD 30 MIN: CPT | Mod: S$GLB,,, | Performed by: NURSE PRACTITIONER

## 2021-02-17 PROCEDURE — 3288F FALL RISK ASSESSMENT DOCD: CPT | Mod: CPTII,S$GLB,, | Performed by: NURSE PRACTITIONER

## 2021-02-17 PROCEDURE — 99214 PR OFFICE/OUTPT VISIT, EST, LEVL IV, 30-39 MIN: ICD-10-PCS | Mod: S$GLB,,, | Performed by: NURSE PRACTITIONER

## 2021-02-17 PROCEDURE — 3075F PR MOST RECENT SYSTOLIC BLOOD PRESS GE 130-139MM HG: ICD-10-PCS | Mod: CPTII,S$GLB,, | Performed by: NURSE PRACTITIONER

## 2021-02-17 PROCEDURE — 3051F PR MOST RECENT HEMOGLOBIN A1C LEVEL 7.0 - < 8.0%: ICD-10-PCS | Mod: CPTII,S$GLB,, | Performed by: NURSE PRACTITIONER

## 2021-02-17 PROCEDURE — 3051F HG A1C>EQUAL 7.0%<8.0%: CPT | Mod: CPTII,S$GLB,, | Performed by: NURSE PRACTITIONER

## 2021-02-17 PROCEDURE — 1126F PR PAIN SEVERITY QUANTIFIED, NO PAIN PRESENT: ICD-10-PCS | Mod: S$GLB,,, | Performed by: NURSE PRACTITIONER

## 2021-02-17 PROCEDURE — 1159F PR MEDICATION LIST DOCUMENTED IN MEDICAL RECORD: ICD-10-PCS | Mod: S$GLB,,, | Performed by: NURSE PRACTITIONER

## 2021-02-17 PROCEDURE — 3288F PR FALLS RISK ASSESSMENT DOCUMENTED: ICD-10-PCS | Mod: CPTII,S$GLB,, | Performed by: NURSE PRACTITIONER

## 2021-02-17 PROCEDURE — 99999 PR PBB SHADOW E&M-EST. PATIENT-LVL III: CPT | Mod: PBBFAC,,, | Performed by: NURSE PRACTITIONER

## 2021-02-17 PROCEDURE — 1159F MED LIST DOCD IN RCRD: CPT | Mod: S$GLB,,, | Performed by: NURSE PRACTITIONER

## 2021-02-17 PROCEDURE — 1101F PR PT FALLS ASSESS DOC 0-1 FALLS W/OUT INJ PAST YR: ICD-10-PCS | Mod: CPTII,S$GLB,, | Performed by: NURSE PRACTITIONER

## 2021-02-17 PROCEDURE — 1101F PT FALLS ASSESS-DOCD LE1/YR: CPT | Mod: CPTII,S$GLB,, | Performed by: NURSE PRACTITIONER

## 2021-02-17 PROCEDURE — 3075F SYST BP GE 130 - 139MM HG: CPT | Mod: CPTII,S$GLB,, | Performed by: NURSE PRACTITIONER

## 2021-02-17 PROCEDURE — 3078F PR MOST RECENT DIASTOLIC BLOOD PRESSURE < 80 MM HG: ICD-10-PCS | Mod: CPTII,S$GLB,, | Performed by: NURSE PRACTITIONER

## 2021-02-17 PROCEDURE — 3078F DIAST BP <80 MM HG: CPT | Mod: CPTII,S$GLB,, | Performed by: NURSE PRACTITIONER

## 2021-02-17 RX ORDER — INSULIN PUMP SYRINGE, 3 ML
EACH MISCELLANEOUS
Qty: 1 EACH | Refills: 0 | Status: SHIPPED | OUTPATIENT
Start: 2021-02-17 | End: 2023-12-06

## 2021-02-17 RX ORDER — PIOGLITAZONEHYDROCHLORIDE 30 MG/1
TABLET ORAL
Qty: 90 TABLET | Refills: 3 | Status: SHIPPED | OUTPATIENT
Start: 2021-02-17 | End: 2021-09-20

## 2021-02-17 RX ORDER — LANCETS
EACH MISCELLANEOUS
Qty: 200 EACH | Refills: 3 | Status: SHIPPED | OUTPATIENT
Start: 2021-02-17

## 2021-02-17 RX ORDER — GLIPIZIDE 2.5 MG/1
2.5 TABLET, EXTENDED RELEASE ORAL
Qty: 90 TABLET | Refills: 3 | Status: SHIPPED | OUTPATIENT
Start: 2021-02-17 | End: 2021-07-02 | Stop reason: SDUPTHER

## 2021-05-12 ENCOUNTER — LAB VISIT (OUTPATIENT)
Dept: LAB | Facility: HOSPITAL | Age: 76
End: 2021-05-12
Attending: NURSE PRACTITIONER
Payer: MEDICARE

## 2021-05-12 DIAGNOSIS — E11.3293 TYPE 2 DIABETES MELLITUS WITH BOTH EYES AFFECTED BY MILD NONPROLIFERATIVE RETINOPATHY WITHOUT MACULAR EDEMA, WITHOUT LONG-TERM CURRENT USE OF INSULIN: ICD-10-CM

## 2021-05-13 ENCOUNTER — PATIENT MESSAGE (OUTPATIENT)
Dept: ENDOCRINOLOGY | Facility: CLINIC | Age: 76
End: 2021-05-13

## 2021-05-19 ENCOUNTER — OFFICE VISIT (OUTPATIENT)
Dept: ENDOCRINOLOGY | Facility: CLINIC | Age: 76
End: 2021-05-19
Payer: MEDICARE

## 2021-05-19 VITALS
SYSTOLIC BLOOD PRESSURE: 138 MMHG | DIASTOLIC BLOOD PRESSURE: 80 MMHG | HEIGHT: 62 IN | HEART RATE: 100 BPM | WEIGHT: 174.5 LBS | BODY MASS INDEX: 32.11 KG/M2

## 2021-05-19 DIAGNOSIS — E55.9 VITAMIN D INSUFFICIENCY: ICD-10-CM

## 2021-05-19 DIAGNOSIS — I10 ESSENTIAL HYPERTENSION: ICD-10-CM

## 2021-05-19 DIAGNOSIS — E11.42 CONTROLLED TYPE 2 DIABETES MELLITUS WITH DIABETIC POLYNEUROPATHY, WITHOUT LONG-TERM CURRENT USE OF INSULIN: ICD-10-CM

## 2021-05-19 DIAGNOSIS — E11.3293 TYPE 2 DIABETES MELLITUS WITH BOTH EYES AFFECTED BY MILD NONPROLIFERATIVE RETINOPATHY WITHOUT MACULAR EDEMA, WITHOUT LONG-TERM CURRENT USE OF INSULIN: Primary | ICD-10-CM

## 2021-05-19 PROCEDURE — 99214 OFFICE O/P EST MOD 30 MIN: CPT | Mod: S$GLB,,, | Performed by: NURSE PRACTITIONER

## 2021-05-19 PROCEDURE — 99214 PR OFFICE/OUTPT VISIT, EST, LEVL IV, 30-39 MIN: ICD-10-PCS | Mod: S$GLB,,, | Performed by: NURSE PRACTITIONER

## 2021-05-19 PROCEDURE — 99999 PR PBB SHADOW E&M-EST. PATIENT-LVL V: ICD-10-PCS | Mod: PBBFAC,,, | Performed by: NURSE PRACTITIONER

## 2021-05-19 PROCEDURE — 99999 PR PBB SHADOW E&M-EST. PATIENT-LVL V: CPT | Mod: PBBFAC,,, | Performed by: NURSE PRACTITIONER

## 2021-05-19 RX ORDER — BLOOD-GLUCOSE METER
EACH MISCELLANEOUS
COMMUNITY
Start: 2021-02-17 | End: 2023-12-06

## 2021-07-02 DIAGNOSIS — E11.3293 TYPE 2 DIABETES MELLITUS WITH BOTH EYES AFFECTED BY MILD NONPROLIFERATIVE RETINOPATHY WITHOUT MACULAR EDEMA, WITHOUT LONG-TERM CURRENT USE OF INSULIN: ICD-10-CM

## 2021-07-02 RX ORDER — GLIPIZIDE 2.5 MG/1
2.5 TABLET, EXTENDED RELEASE ORAL
Qty: 90 TABLET | Refills: 3 | Status: SHIPPED | OUTPATIENT
Start: 2021-07-02 | End: 2021-12-28 | Stop reason: SDUPTHER

## 2021-09-13 ENCOUNTER — LAB VISIT (OUTPATIENT)
Dept: LAB | Facility: HOSPITAL | Age: 76
End: 2021-09-13
Attending: NURSE PRACTITIONER
Payer: MEDICARE

## 2021-09-13 DIAGNOSIS — E11.3293 TYPE 2 DIABETES MELLITUS WITH BOTH EYES AFFECTED BY MILD NONPROLIFERATIVE RETINOPATHY WITHOUT MACULAR EDEMA, WITHOUT LONG-TERM CURRENT USE OF INSULIN: ICD-10-CM

## 2021-09-13 DIAGNOSIS — E55.9 VITAMIN D INSUFFICIENCY: ICD-10-CM

## 2021-09-13 LAB
ALBUMIN SERPL BCP-MCNC: 3.5 G/DL (ref 3.5–5.2)
ALP SERPL-CCNC: 49 U/L (ref 55–135)
ALT SERPL W/O P-5'-P-CCNC: 13 U/L (ref 10–44)
ANION GAP SERPL CALC-SCNC: 9 MMOL/L (ref 8–16)
AST SERPL-CCNC: 19 U/L (ref 10–40)
BILIRUB SERPL-MCNC: 0.3 MG/DL (ref 0.1–1)
BUN SERPL-MCNC: 12 MG/DL (ref 8–23)
CALCIUM SERPL-MCNC: 9.4 MG/DL (ref 8.7–10.5)
CHLORIDE SERPL-SCNC: 107 MMOL/L (ref 95–110)
CO2 SERPL-SCNC: 24 MMOL/L (ref 23–29)
CREAT SERPL-MCNC: 1.1 MG/DL (ref 0.5–1.4)
EST. GFR  (AFRICAN AMERICAN): 56 ML/MIN/1.73 M^2
EST. GFR  (NON AFRICAN AMERICAN): 48.5 ML/MIN/1.73 M^2
ESTIMATED AVG GLUCOSE: 157 MG/DL (ref 68–131)
GLUCOSE SERPL-MCNC: 140 MG/DL (ref 70–110)
HBA1C MFR BLD: 7.1 % (ref 4–5.6)
POTASSIUM SERPL-SCNC: 4.2 MMOL/L (ref 3.5–5.1)
PROT SERPL-MCNC: 6.6 G/DL (ref 6–8.4)
SODIUM SERPL-SCNC: 140 MMOL/L (ref 136–145)

## 2021-09-13 PROCEDURE — 83036 HEMOGLOBIN GLYCOSYLATED A1C: CPT | Performed by: NURSE PRACTITIONER

## 2021-09-13 PROCEDURE — 82570 ASSAY OF URINE CREATININE: CPT | Performed by: NURSE PRACTITIONER

## 2021-09-13 PROCEDURE — 36415 COLL VENOUS BLD VENIPUNCTURE: CPT | Mod: PO | Performed by: NURSE PRACTITIONER

## 2021-09-13 PROCEDURE — 80053 COMPREHEN METABOLIC PANEL: CPT | Performed by: NURSE PRACTITIONER

## 2021-09-13 PROCEDURE — 82306 VITAMIN D 25 HYDROXY: CPT | Performed by: NURSE PRACTITIONER

## 2021-09-14 LAB
25(OH)D3+25(OH)D2 SERPL-MCNC: 60 NG/ML (ref 30–96)
ALBUMIN/CREAT UR: 8.7 UG/MG (ref 0–30)
CREAT UR-MCNC: 127 MG/DL (ref 15–325)
MICROALBUMIN UR DL<=1MG/L-MCNC: 11 UG/ML

## 2021-09-20 ENCOUNTER — OFFICE VISIT (OUTPATIENT)
Dept: ENDOCRINOLOGY | Facility: CLINIC | Age: 76
End: 2021-09-20
Payer: MEDICARE

## 2021-09-20 VITALS
HEIGHT: 62 IN | BODY MASS INDEX: 30.55 KG/M2 | DIASTOLIC BLOOD PRESSURE: 82 MMHG | HEART RATE: 122 BPM | SYSTOLIC BLOOD PRESSURE: 144 MMHG | WEIGHT: 166 LBS

## 2021-09-20 DIAGNOSIS — I10 ESSENTIAL HYPERTENSION: ICD-10-CM

## 2021-09-20 DIAGNOSIS — Z79.4 TYPE 2 DIABETES MELLITUS WITH BOTH EYES AFFECTED BY MILD NONPROLIFERATIVE RETINOPATHY WITHOUT MACULAR EDEMA, WITH LONG-TERM CURRENT USE OF INSULIN: ICD-10-CM

## 2021-09-20 DIAGNOSIS — R00.0 TACHYCARDIA: ICD-10-CM

## 2021-09-20 DIAGNOSIS — E11.3293 TYPE 2 DIABETES MELLITUS WITH BOTH EYES AFFECTED BY MILD NONPROLIFERATIVE RETINOPATHY WITHOUT MACULAR EDEMA, WITH LONG-TERM CURRENT USE OF INSULIN: ICD-10-CM

## 2021-09-20 DIAGNOSIS — E11.3293 TYPE 2 DIABETES MELLITUS WITH BOTH EYES AFFECTED BY MILD NONPROLIFERATIVE RETINOPATHY WITHOUT MACULAR EDEMA, WITHOUT LONG-TERM CURRENT USE OF INSULIN: Primary | ICD-10-CM

## 2021-09-20 DIAGNOSIS — E11.40 TYPE 2 DIABETES MELLITUS WITH DIABETIC NEUROPATHY, WITHOUT LONG-TERM CURRENT USE OF INSULIN: ICD-10-CM

## 2021-09-20 PROCEDURE — 99214 OFFICE O/P EST MOD 30 MIN: CPT | Mod: S$GLB,,, | Performed by: NURSE PRACTITIONER

## 2021-09-20 PROCEDURE — 99214 PR OFFICE/OUTPT VISIT, EST, LEVL IV, 30-39 MIN: ICD-10-PCS | Mod: S$GLB,,, | Performed by: NURSE PRACTITIONER

## 2021-09-20 PROCEDURE — 99999 PR PBB SHADOW E&M-EST. PATIENT-LVL IV: CPT | Mod: PBBFAC,,, | Performed by: NURSE PRACTITIONER

## 2021-09-20 PROCEDURE — 99999 PR PBB SHADOW E&M-EST. PATIENT-LVL IV: ICD-10-PCS | Mod: PBBFAC,,, | Performed by: NURSE PRACTITIONER

## 2021-10-18 NOTE — PROGRESS NOTES
CC: Ms. Swetha Thompson arrives today for management of Type 2 DM and review of chronic medical conditions, as listed in the visit diagnosis section of this encounter.     HPI: Ms. Swetha Thompson was diagnosed with Type 2 DM in her 50s. Initial treatment consisted of oral medications and insulin was added in ~2011. Actos was added by PCP in 2018. Insulin was discontinued in 2018, following hypoglycemia and significant decrease in insulin needs. However, her readings increased >150 mg/dL so she resumed low dose Tresiba. + FH of DM in mother, brother, sister, maternal GM. Past ER visit on 6/26/18 via EMS, due to hypoglycemia in the 30s, which caused loss of consciousness at home. Insulin doses were adjusted at this time.      Patient was last seen by me in December.     BG readings are checked 1x/day, mostly fasting. Brings logs. These range .      Hypoglycemia: Denies    Missing Insulin/PO medication doses: No  Timing prandial insulin 5-15 minutes before meals: n/a     Dietary Habits: Eats 3 meals daily. Eats some meals at her children's houses. Rare snacking. Drinks ~ 6 oz Coke infrequently.   B: biscuit or toast with egg and merida  L: sandwich  D: turkey neck, rice, gravy, green peas    Last DM education appointment: 5/2017        CURRENT DIABETIC MEDS: Metformin XR 1000 mg BID, Actos 15 mg daily, Tresiba 10 units QAM  Vial or pen: pen  Glucometer type: unsure    Previous DM meds:  Levemir  Novolog     Last Eye Exam: 9/6/2018, no DR.   Last Podiatry Exam: 2017    REVIEW OF SYSTEMS  Constitutional: no c/o weakness, fatigue. + 10# weight loss that she attributes to improved diet.  Eyes: denies visual disturbances.  Cardiac: no chest pain, palpitations.  Respiratory: no dyspnea, cough.  GI: no c/o abdominal pain, nausea. Denies h/o pancreatitis.   Skin: no lesions or rashes.  Neuro: no numbness, tingling, paresthesias.   Endocrine: denies polyphagia, polydipsia, polyuria.    Personally reviewed Past  "Medical, Surgical, Social History.    Vital Signs  BP (!) 140/80   Pulse 100   Ht 5' 2" (1.575 m)   Wt 78.1 kg (172 lb 2.9 oz)   BMI 31.49 kg/m²      Personally reviewed the below labs:    Hemoglobin A1C   Date Value Ref Range Status   04/04/2019 6.8 (H) 4.0 - 5.6 % Final     Comment:     ADA Screening Guidelines:  5.7-6.4%  Consistent with prediabetes  >or=6.5%  Consistent with diabetes  High levels of fetal hemoglobin interfere with the HbA1C  assay. Heterozygous hemoglobin variants (HbS, HgC, etc)do  not significantly interfere with this assay.   However, presence of multiple variants may affect accuracy.     12/06/2018 6.9 (H) 4.0 - 5.6 % Final     Comment:     ADA Screening Guidelines:  5.7-6.4%  Consistent with prediabetes  >or=6.5%  Consistent with diabetes  High levels of fetal hemoglobin interfere with the HbA1C  assay. Heterozygous hemoglobin variants (HbS, HgC, etc)do  not significantly interfere with this assay.   However, presence of multiple variants may affect accuracy.     08/06/2018 6.6 (H) 4.0 - 5.6 % Final     Comment:     ADA Screening Guidelines:  5.7-6.4%  Consistent with prediabetes  >or=6.5%  Consistent with diabetes  High levels of fetal hemoglobin interfere with the HbA1C  assay. Heterozygous hemoglobin variants (HbS, HgC, etc)do  not significantly interfere with this assay.   However, presence of multiple variants may affect accuracy.         Chemistry        Component Value Date/Time     12/06/2018 0736    K 3.7 12/06/2018 0736     12/06/2018 0736    CO2 26 12/06/2018 0736    BUN 15 12/06/2018 0736    CREATININE 0.9 12/06/2018 0736     (H) 12/06/2018 0736        Component Value Date/Time    CALCIUM 9.0 12/06/2018 0736    ALKPHOS 51 (L) 12/06/2018 0736    AST 16 12/06/2018 0736    ALT 12 12/06/2018 0736    BILITOT 0.4 12/06/2018 0736    ESTGFRAFRICA >60.0 12/06/2018 0736    EGFRNONAA >60.0 12/06/2018 0736          Lab Results   Component Value Date    CHOL 165 " 12/06/2018    CHOL 179 10/31/2017    CHOL 172 07/20/2017     Lab Results   Component Value Date    HDL 56 12/06/2018    HDL 59 10/31/2017    HDL 59 07/20/2017     Lab Results   Component Value Date    LDLCALC 96.0 12/06/2018    LDLCALC 103.0 10/31/2017    LDLCALC 97.4 07/20/2017     Lab Results   Component Value Date    TRIG 65 12/06/2018    TRIG 85 10/31/2017    TRIG 78 07/20/2017     Lab Results   Component Value Date    CHOLHDL 33.9 12/06/2018    CHOLHDL 33.0 10/31/2017    CHOLHDL 34.3 07/20/2017       Lab Results   Component Value Date    MICALBCREAT 16.5 05/08/2018     Lab Results   Component Value Date    TSH 3.576 08/06/2018     CrCl cannot be calculated (Patient's most recent lab result is older than the maximum 7 days allowed.).    No results found for: [id number]     PHYSICAL EXAMINATION  Constitutional: Appears well, no distress.  Neck: Supple, trachea midline; no thyromegaly or nodules.   Respiratory: CTA, even and unlabored.  Cardiovascular: RRR, no murmurs, no carotid bruits. DP pulses  2+ bilaterally; no edema.    Lymph: no cervical or supraclavicular lymphadenopathy  Skin: warm and dry; no lipohypertrophy, or acanthosis nigracans observed.  Neuro: DTR diminished to BUE/2+ BLE. Previously, no loss of protective sensation via 10 gm monofilament or vibratory exam bilaterally.  Feet: appropriate footwear.       A1c target < 7.5%      Assessment/Plan  1. Type 2 diabetes mellitus with diabetic cataract, with long-term current use of insulin  -- Controlled without hypoglycemia.   -- continue Tresiba 10 units QAM.   -- continue metformin and Actos   -- check BG 1x/day, alternating times, and notify me if BG consistently < 80.    -- Discussed diagnosis of DM, A1c goals, progression of disease, long term complications and tx options.    -- Reviewed hypoglycemia management: treat with 1/2 glass of juice, 1/2 can regular coke, or 4 glucose tablets. Monitor and repeat treatment every 15 minutes until BG is >70  Then have a snack, which includes a complex carbohydrate and protein.   Advised patient to check BG before activities, such as driving or exercise.    -- takes ARB, statin   2. Hypertension -- borderline elevated today.  -- continue losartan       FOLLOW UP  Follow up in about 6 months (around 10/8/2019).  Patient instructed to bring BG logs to each follow up   Patient encouraged to call for any BG/medication issues, concerns, or questions.    Orders Placed This Encounter   Procedures    Hemoglobin A1c    Comprehensive metabolic panel    Microalbumin/creatinine urine ratio    Vitamin D    TSH        Complex Repair And Dorsal Nasal Flap Text: The defect edges were debeveled with a #15 scalpel blade.  The primary defect was closed partially with a complex linear closure.  Given the location of the remaining defect, shape of the defect and the proximity to free margins a dorsal nasal flap was deemed most appropriate for complete closure of the defect.  Using a sterile surgical marker, an appropriate flap was drawn incorporating the defect and placing the expected incisions within the relaxed skin tension lines where possible.    The area thus outlined was incised deep to adipose tissue with a #15 scalpel blade.  The skin margins were undermined to an appropriate distance in all directions utilizing iris scissors.

## 2021-11-22 ENCOUNTER — LAB VISIT (OUTPATIENT)
Dept: LAB | Facility: HOSPITAL | Age: 76
End: 2021-11-22
Attending: NURSE PRACTITIONER
Payer: MEDICARE

## 2021-11-22 DIAGNOSIS — E11.3293 TYPE 2 DIABETES MELLITUS WITH BOTH EYES AFFECTED BY MILD NONPROLIFERATIVE RETINOPATHY WITHOUT MACULAR EDEMA, WITHOUT LONG-TERM CURRENT USE OF INSULIN: ICD-10-CM

## 2021-11-22 LAB
ALBUMIN SERPL BCP-MCNC: 3.5 G/DL (ref 3.5–5.2)
ALP SERPL-CCNC: 53 U/L (ref 55–135)
ALT SERPL W/O P-5'-P-CCNC: 8 U/L (ref 10–44)
ANION GAP SERPL CALC-SCNC: 7 MMOL/L (ref 8–16)
AST SERPL-CCNC: 15 U/L (ref 10–40)
BILIRUB SERPL-MCNC: 0.3 MG/DL (ref 0.1–1)
BUN SERPL-MCNC: 15 MG/DL (ref 8–23)
CALCIUM SERPL-MCNC: 9.4 MG/DL (ref 8.7–10.5)
CHLORIDE SERPL-SCNC: 104 MMOL/L (ref 95–110)
CO2 SERPL-SCNC: 27 MMOL/L (ref 23–29)
CREAT SERPL-MCNC: 1.1 MG/DL (ref 0.5–1.4)
EST. GFR  (AFRICAN AMERICAN): 56 ML/MIN/1.73 M^2
EST. GFR  (NON AFRICAN AMERICAN): 48.5 ML/MIN/1.73 M^2
ESTIMATED AVG GLUCOSE: 163 MG/DL (ref 68–131)
GLUCOSE SERPL-MCNC: 176 MG/DL (ref 70–110)
HBA1C MFR BLD: 7.3 % (ref 4–5.6)
POTASSIUM SERPL-SCNC: 4.3 MMOL/L (ref 3.5–5.1)
PROT SERPL-MCNC: 6.7 G/DL (ref 6–8.4)
SODIUM SERPL-SCNC: 138 MMOL/L (ref 136–145)

## 2021-11-22 PROCEDURE — 80053 COMPREHEN METABOLIC PANEL: CPT | Performed by: NURSE PRACTITIONER

## 2021-11-22 PROCEDURE — 36415 COLL VENOUS BLD VENIPUNCTURE: CPT | Mod: PO | Performed by: NURSE PRACTITIONER

## 2021-11-22 PROCEDURE — 83036 HEMOGLOBIN GLYCOSYLATED A1C: CPT | Performed by: NURSE PRACTITIONER

## 2021-12-28 DIAGNOSIS — E11.3293 TYPE 2 DIABETES MELLITUS WITH BOTH EYES AFFECTED BY MILD NONPROLIFERATIVE RETINOPATHY WITHOUT MACULAR EDEMA, WITHOUT LONG-TERM CURRENT USE OF INSULIN: ICD-10-CM

## 2021-12-28 RX ORDER — GLIPIZIDE 2.5 MG/1
2.5 TABLET, EXTENDED RELEASE ORAL
Qty: 90 TABLET | Refills: 3 | Status: SHIPPED | OUTPATIENT
Start: 2021-12-28 | End: 2022-01-03

## 2021-12-28 RX ORDER — INSULIN PUMP SYRINGE, 3 ML
EACH MISCELLANEOUS
Qty: 1 EACH | Refills: 0 | Status: SHIPPED | OUTPATIENT
Start: 2021-12-28 | End: 2022-03-31

## 2021-12-28 RX ORDER — BLOOD-GLUCOSE CONTROL, NORMAL
EACH MISCELLANEOUS
Qty: 200 EACH | Refills: 3 | Status: SHIPPED | OUTPATIENT
Start: 2021-12-28 | End: 2022-12-08

## 2021-12-28 RX ORDER — BLOOD-GLUCOSE METER
EACH MISCELLANEOUS
Qty: 1 EACH | Refills: 1 | Status: SHIPPED | OUTPATIENT
Start: 2021-12-28

## 2022-01-03 ENCOUNTER — OFFICE VISIT (OUTPATIENT)
Dept: ENDOCRINOLOGY | Facility: CLINIC | Age: 77
End: 2022-01-03
Payer: MEDICARE

## 2022-01-03 VITALS
HEIGHT: 62 IN | WEIGHT: 168.31 LBS | HEART RATE: 91 BPM | DIASTOLIC BLOOD PRESSURE: 70 MMHG | BODY MASS INDEX: 30.97 KG/M2 | SYSTOLIC BLOOD PRESSURE: 120 MMHG

## 2022-01-03 DIAGNOSIS — E11.3293 TYPE 2 DIABETES MELLITUS WITH BOTH EYES AFFECTED BY MILD NONPROLIFERATIVE RETINOPATHY WITHOUT MACULAR EDEMA, WITHOUT LONG-TERM CURRENT USE OF INSULIN: Primary | ICD-10-CM

## 2022-01-03 DIAGNOSIS — E11.3293 TYPE 2 DIABETES MELLITUS WITH BOTH EYES AFFECTED BY MILD NONPROLIFERATIVE RETINOPATHY WITHOUT MACULAR EDEMA, WITHOUT LONG-TERM CURRENT USE OF INSULIN: ICD-10-CM

## 2022-01-03 DIAGNOSIS — E11.40 TYPE 2 DIABETES MELLITUS WITH DIABETIC NEUROPATHY, WITHOUT LONG-TERM CURRENT USE OF INSULIN: ICD-10-CM

## 2022-01-03 DIAGNOSIS — R80.9 TYPE 2 DIABETES MELLITUS WITH MICROALBUMINURIA, WITH LONG-TERM CURRENT USE OF INSULIN: ICD-10-CM

## 2022-01-03 DIAGNOSIS — E11.29 TYPE 2 DIABETES MELLITUS WITH MICROALBUMINURIA, WITH LONG-TERM CURRENT USE OF INSULIN: ICD-10-CM

## 2022-01-03 DIAGNOSIS — I10 ESSENTIAL HYPERTENSION: ICD-10-CM

## 2022-01-03 DIAGNOSIS — Z79.4 TYPE 2 DIABETES MELLITUS WITH MICROALBUMINURIA, WITH LONG-TERM CURRENT USE OF INSULIN: ICD-10-CM

## 2022-01-03 PROCEDURE — 99999 PR PBB SHADOW E&M-EST. PATIENT-LVL V: CPT | Mod: PBBFAC,,, | Performed by: NURSE PRACTITIONER

## 2022-01-03 PROCEDURE — 99999 PR PBB SHADOW E&M-EST. PATIENT-LVL V: ICD-10-PCS | Mod: PBBFAC,,, | Performed by: NURSE PRACTITIONER

## 2022-01-03 PROCEDURE — 99214 OFFICE O/P EST MOD 30 MIN: CPT | Mod: S$GLB,,, | Performed by: NURSE PRACTITIONER

## 2022-01-03 PROCEDURE — 99214 PR OFFICE/OUTPT VISIT, EST, LEVL IV, 30-39 MIN: ICD-10-PCS | Mod: S$GLB,,, | Performed by: NURSE PRACTITIONER

## 2022-01-03 RX ORDER — METFORMIN HYDROCHLORIDE 500 MG/1
TABLET, EXTENDED RELEASE ORAL
Qty: 360 TABLET | Refills: 3 | Status: SHIPPED | OUTPATIENT
Start: 2022-01-03 | End: 2022-12-23

## 2022-01-03 RX ORDER — GLIPIZIDE 2.5 MG/1
2.5 TABLET, EXTENDED RELEASE ORAL
Qty: 180 TABLET | Refills: 3 | Status: SHIPPED | OUTPATIENT
Start: 2022-01-03 | End: 2022-01-05 | Stop reason: SDUPTHER

## 2022-01-03 RX ORDER — GLIPIZIDE 2.5 MG/1
2.5 TABLET, EXTENDED RELEASE ORAL
Qty: 180 TABLET | Refills: 3 | Status: SHIPPED | OUTPATIENT
Start: 2022-01-03 | End: 2022-01-03 | Stop reason: SDUPTHER

## 2022-01-03 NOTE — PROGRESS NOTES
CC: Ms. Swetha Thompson arrives today for management of Type 2 DM and review of chronic medical conditions, as listed in the visit diagnosis section of this encounter.     HPI: Ms. Swetha Thompson was diagnosed with Type 2 DM in her 50s. Initial treatment consisted of oral medications and insulin was added in ~2011. Actos was added by PCP in 2018. Insulin was discontinued in 2018, following hypoglycemia and significant decrease in insulin needs. However, her readings increased >150 mg/dL so she resumed low dose Tresiba. + FH of DM in mother, brother, sister, maternal GM. Past ER visit on 6/26/18 via EMS, due to hypoglycemia in the 30s, which caused loss of consciousness at home. Insulin doses were adjusted at this time. Tresiba later discontinued in 2019, due to hypoglycemia on low dose of 10 units daily.      Patient was last seen by me in September.    BG readings are checked 1x/day, fasting only.             Hypoglycemia: No    Missing Insulin/PO medication doses: No     Dietary Habits: Eats 3 meals daily. Snacking on PB crackers. Avoids sugary beverages.     Last DM education appointment: 5/2017        CURRENT DIABETIC MEDS: Metformin XR 1000 mg BID, glipizide XR 2.5 mg daily  Vial or pen: pen  Glucometer type: unsure    Previous DM meds:  Levemir  Novolog   Tresiba - hypoglycemia on low dose of 10 units  Pioglitazone - stopped after changing pharmacy    Last Eye Exam: 9/2020, + DR. Dr. Shields. Has upcoming appt  Last Podiatry Exam: 2017    REVIEW OF SYSTEMS  Constitutional: no c/o weakness, fatigue, weight loss.   Eyes: denies visual disturbances.  Cardiac: no chest pain, palpitations.   Respiratory: no dyspnea, cough.  GI: no c/o abdominal pain, nausea. Denies h/o pancreatitis.   Skin: no lesions or rashes.  Neuro: no numbness, tingling, paresthesias.   Endocrine: denies polyphagia, polydipsia, polyuria.     Personally reviewed Past Medical, Surgical, Social History.    Vital Signs  /70   Pulse  "91   Ht 5' 2" (1.575 m)   Wt 76.4 kg (168 lb 5.1 oz)   BMI 30.79 kg/m²      Personally reviewed the below labs:    Hemoglobin A1C   Date Value Ref Range Status   11/22/2021 7.3 (H) 4.0 - 5.6 % Final     Comment:     ADA Screening Guidelines:  5.7-6.4%  Consistent with prediabetes  >or=6.5%  Consistent with diabetes    High levels of fetal hemoglobin interfere with the HbA1C  assay. Heterozygous hemoglobin variants (HbS, HgC, etc)do  not significantly interfere with this assay.   However, presence of multiple variants may affect accuracy.     09/13/2021 7.1 (H) 4.0 - 5.6 % Final     Comment:     ADA Screening Guidelines:  5.7-6.4%  Consistent with prediabetes  >or=6.5%  Consistent with diabetes    High levels of fetal hemoglobin interfere with the HbA1C  assay. Heterozygous hemoglobin variants (HbS, HgC, etc)do  not significantly interfere with this assay.   However, presence of multiple variants may affect accuracy.     05/12/2021 7.0 (H) 4.0 - 5.6 % Final     Comment:     ADA Screening Guidelines:  5.7-6.4%  Consistent with prediabetes  >or=6.5%  Consistent with diabetes    High levels of fetal hemoglobin interfere with the HbA1C  assay. Heterozygous hemoglobin variants (HbS, HgC, etc)do  not significantly interfere with this assay.   However, presence of multiple variants may affect accuracy.         Chemistry        Component Value Date/Time     11/22/2021 1113    K 4.3 11/22/2021 1113     11/22/2021 1113    CO2 27 11/22/2021 1113    BUN 15 11/22/2021 1113    CREATININE 1.1 11/22/2021 1113     (H) 11/22/2021 1113        Component Value Date/Time    CALCIUM 9.4 11/22/2021 1113    ALKPHOS 53 (L) 11/22/2021 1113    AST 15 11/22/2021 1113    ALT 8 (L) 11/22/2021 1113    BILITOT 0.3 11/22/2021 1113    ESTGFRAFRICA 56.0 (A) 11/22/2021 1113    EGFRNONAA 48.5 (A) 11/22/2021 1113          Lab Results   Component Value Date    CHOL 188 05/12/2021    CHOL 189 05/15/2020    CHOL 165 12/06/2018     Lab " Results   Component Value Date    HDL 64 05/12/2021    HDL 62 05/15/2020    HDL 56 12/06/2018     Lab Results   Component Value Date    LDLCALC 110.6 05/12/2021    LDLCALC 116.2 05/15/2020    LDLCALC 96.0 12/06/2018     Lab Results   Component Value Date    TRIG 67 05/12/2021    TRIG 54 05/15/2020    TRIG 65 12/06/2018     Lab Results   Component Value Date    CHOLHDL 34.0 05/12/2021    CHOLHDL 32.8 05/15/2020    CHOLHDL 33.9 12/06/2018       Lab Results   Component Value Date    MICALBCREAT 8.7 09/13/2021     Lab Results   Component Value Date    TSH 2.447 02/03/2020     CrCl cannot be calculated (Patient's most recent lab result is older than the maximum 7 days allowed.).    Vit D, 25-Hydroxy   Date Value Ref Range Status   09/13/2021 60 30 - 96 ng/mL Final     Comment:     Vitamin D deficiency.........<10 ng/mL                              Vitamin D insufficiency......10-29 ng/mL       Vitamin D sufficiency........> or equal to 30 ng/mL  Vitamin D toxicity............>100 ng/mL          PHYSICAL EXAMINATION  Constitutional: Appears well, no distress.  Neck: Supple, trachea midline.  Respiratory: CTA, even and unlabored.  Cardiovascular: RRR, no murmurs, no carotid bruits.   GI: active bowel sounds, no hernia  Skin: warm and dry  Neuro: oriented to person, place, time  Feet: appropriate footwear.   Protective Sensation (w/ 10 gram monofilament):  Right: Intact  Left: Intact    Visual Inspection:  Onychomycosis -  Bilateral    Pedal Pulses:   Right: Present  Left: Present    Posterior tibialis:   Right:Present  Left: Present          Goals      HEMOGLOBIN A1C < 7.5               Assessment/Plan  1. 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 Fasting hyperglycemia noted.   -- change glipizide 2.5 mg to twice daily with meals.   -- continue metformin  -- check BG 1x/day. Notify me  if BG start running < 90.   -- schedule eye exam  -- takes ARB, statin   2. Type 2 diabetes mellitus with diabetic neuropathy -- maintain DM control.    3. Hypertension -- controlled  -- continue losartan       FOLLOW UP  Follow up in about 3 months (around 4/3/2022).  Patient instructed to bring BG logs to each follow up   Patient encouraged to call for any BG/medication issues, concerns, or questions.    Orders Placed This Encounter   Procedures    Hemoglobin A1C    Basic Metabolic Panel

## 2022-01-03 NOTE — TELEPHONE ENCOUNTER
----- Message from Aishwarya Pantoja sent at 1/3/2022 11:15 AM CST -----  Contact: Mya from [facility] at [phone]  Type:  RX Refill Request    Who Called:  Mya  Refill or New Rx:  refill  RX Name and Strength:  metFORMIN (GLUCOPHAGE-XR) 500 MG 24 hr tablet, BD alcohol swabs, glyburide 2.5mg   How is the patient currently taking it? (ex. 1XDay):  as directed  Is this a 30 day or 90 day RX:  90  Preferred Pharmacy with phone number:    [facility] - [place], [place] - [address]  [address] [address]  Phone: [phone] Fax: [phone]  Local or Mail Order:  Mail  Ordering Provider:  Mickie Vogel Call Back Number:  [phone]  Additional Information:  Mya from [facility] is calling to request refill for the patient of metFORMIN (GLUCOPHAGE-XR) 500 MG 24 hr tablet, BD alcohol swabs, glyburide 2.5mg . Please call back and advise

## 2022-01-05 DIAGNOSIS — E11.3293 TYPE 2 DIABETES MELLITUS WITH BOTH EYES AFFECTED BY MILD NONPROLIFERATIVE RETINOPATHY WITHOUT MACULAR EDEMA, WITHOUT LONG-TERM CURRENT USE OF INSULIN: ICD-10-CM

## 2022-01-05 RX ORDER — GLIPIZIDE 2.5 MG/1
2.5 TABLET, EXTENDED RELEASE ORAL
Qty: 180 TABLET | Refills: 3 | Status: SHIPPED | OUTPATIENT
Start: 2022-01-05 | End: 2022-07-15

## 2022-03-08 ENCOUNTER — TELEPHONE (OUTPATIENT)
Dept: ENDOCRINOLOGY | Facility: CLINIC | Age: 77
End: 2022-03-08
Payer: MEDICARE

## 2022-03-08 NOTE — TELEPHONE ENCOUNTER
"Rx request for pioglitazone 30 mg came from [facility]    According to med list and last note not currently taking:  "Previous DM meds:  Pioglitazone - stopped after changing pharmacy"    Want to fill pioglitazone or cont not taking?  "

## 2022-04-04 ENCOUNTER — LAB VISIT (OUTPATIENT)
Dept: LAB | Facility: HOSPITAL | Age: 77
End: 2022-04-04
Attending: NURSE PRACTITIONER
Payer: MEDICARE

## 2022-04-04 ENCOUNTER — OFFICE VISIT (OUTPATIENT)
Dept: ENDOCRINOLOGY | Facility: CLINIC | Age: 77
End: 2022-04-04
Payer: MEDICARE

## 2022-04-04 VITALS
DIASTOLIC BLOOD PRESSURE: 80 MMHG | HEIGHT: 62 IN | SYSTOLIC BLOOD PRESSURE: 130 MMHG | HEART RATE: 116 BPM | WEIGHT: 170.63 LBS | BODY MASS INDEX: 31.4 KG/M2

## 2022-04-04 DIAGNOSIS — E11.3293 TYPE 2 DIABETES MELLITUS WITH BOTH EYES AFFECTED BY MILD NONPROLIFERATIVE RETINOPATHY WITHOUT MACULAR EDEMA, WITHOUT LONG-TERM CURRENT USE OF INSULIN: Primary | ICD-10-CM

## 2022-04-04 DIAGNOSIS — E11.40 TYPE 2 DIABETES MELLITUS WITH DIABETIC NEUROPATHY, WITHOUT LONG-TERM CURRENT USE OF INSULIN: ICD-10-CM

## 2022-04-04 DIAGNOSIS — R00.0 TACHYCARDIA: ICD-10-CM

## 2022-04-04 DIAGNOSIS — I10 ESSENTIAL HYPERTENSION: ICD-10-CM

## 2022-04-04 DIAGNOSIS — E11.3293 TYPE 2 DIABETES MELLITUS WITH BOTH EYES AFFECTED BY MILD NONPROLIFERATIVE RETINOPATHY WITHOUT MACULAR EDEMA, WITHOUT LONG-TERM CURRENT USE OF INSULIN: ICD-10-CM

## 2022-04-04 LAB
ESTIMATED AVG GLUCOSE: 194 MG/DL (ref 68–131)
HBA1C MFR BLD: 8.4 % (ref 4–5.6)

## 2022-04-04 PROCEDURE — 1159F PR MEDICATION LIST DOCUMENTED IN MEDICAL RECORD: ICD-10-PCS | Mod: CPTII,S$GLB,, | Performed by: NURSE PRACTITIONER

## 2022-04-04 PROCEDURE — 3075F SYST BP GE 130 - 139MM HG: CPT | Mod: CPTII,S$GLB,, | Performed by: NURSE PRACTITIONER

## 2022-04-04 PROCEDURE — 3075F PR MOST RECENT SYSTOLIC BLOOD PRESS GE 130-139MM HG: ICD-10-PCS | Mod: CPTII,S$GLB,, | Performed by: NURSE PRACTITIONER

## 2022-04-04 PROCEDURE — 36415 COLL VENOUS BLD VENIPUNCTURE: CPT | Mod: PO | Performed by: NURSE PRACTITIONER

## 2022-04-04 PROCEDURE — 1126F AMNT PAIN NOTED NONE PRSNT: CPT | Mod: CPTII,S$GLB,, | Performed by: NURSE PRACTITIONER

## 2022-04-04 PROCEDURE — 99214 OFFICE O/P EST MOD 30 MIN: CPT | Mod: S$GLB,,, | Performed by: NURSE PRACTITIONER

## 2022-04-04 PROCEDURE — 3288F FALL RISK ASSESSMENT DOCD: CPT | Mod: CPTII,S$GLB,, | Performed by: NURSE PRACTITIONER

## 2022-04-04 PROCEDURE — 3051F PR MOST RECENT HEMOGLOBIN A1C LEVEL 7.0 - < 8.0%: ICD-10-PCS | Mod: CPTII,S$GLB,, | Performed by: NURSE PRACTITIONER

## 2022-04-04 PROCEDURE — 3079F DIAST BP 80-89 MM HG: CPT | Mod: CPTII,S$GLB,, | Performed by: NURSE PRACTITIONER

## 2022-04-04 PROCEDURE — 1159F MED LIST DOCD IN RCRD: CPT | Mod: CPTII,S$GLB,, | Performed by: NURSE PRACTITIONER

## 2022-04-04 PROCEDURE — 1160F RVW MEDS BY RX/DR IN RCRD: CPT | Mod: CPTII,S$GLB,, | Performed by: NURSE PRACTITIONER

## 2022-04-04 PROCEDURE — 3079F PR MOST RECENT DIASTOLIC BLOOD PRESSURE 80-89 MM HG: ICD-10-PCS | Mod: CPTII,S$GLB,, | Performed by: NURSE PRACTITIONER

## 2022-04-04 PROCEDURE — 99214 PR OFFICE/OUTPT VISIT, EST, LEVL IV, 30-39 MIN: ICD-10-PCS | Mod: S$GLB,,, | Performed by: NURSE PRACTITIONER

## 2022-04-04 PROCEDURE — 99999 PR PBB SHADOW E&M-EST. PATIENT-LVL V: CPT | Mod: PBBFAC,,, | Performed by: NURSE PRACTITIONER

## 2022-04-04 PROCEDURE — 3051F HG A1C>EQUAL 7.0%<8.0%: CPT | Mod: CPTII,S$GLB,, | Performed by: NURSE PRACTITIONER

## 2022-04-04 PROCEDURE — 3288F PR FALLS RISK ASSESSMENT DOCUMENTED: ICD-10-PCS | Mod: CPTII,S$GLB,, | Performed by: NURSE PRACTITIONER

## 2022-04-04 PROCEDURE — 83036 HEMOGLOBIN GLYCOSYLATED A1C: CPT | Performed by: NURSE PRACTITIONER

## 2022-04-04 PROCEDURE — 1101F PR PT FALLS ASSESS DOC 0-1 FALLS W/OUT INJ PAST YR: ICD-10-PCS | Mod: CPTII,S$GLB,, | Performed by: NURSE PRACTITIONER

## 2022-04-04 PROCEDURE — 1126F PR PAIN SEVERITY QUANTIFIED, NO PAIN PRESENT: ICD-10-PCS | Mod: CPTII,S$GLB,, | Performed by: NURSE PRACTITIONER

## 2022-04-04 PROCEDURE — 1160F PR REVIEW ALL MEDS BY PRESCRIBER/CLIN PHARMACIST DOCUMENTED: ICD-10-PCS | Mod: CPTII,S$GLB,, | Performed by: NURSE PRACTITIONER

## 2022-04-04 PROCEDURE — 99999 PR PBB SHADOW E&M-EST. PATIENT-LVL V: ICD-10-PCS | Mod: PBBFAC,,, | Performed by: NURSE PRACTITIONER

## 2022-04-04 PROCEDURE — 1101F PT FALLS ASSESS-DOCD LE1/YR: CPT | Mod: CPTII,S$GLB,, | Performed by: NURSE PRACTITIONER

## 2022-04-04 RX ORDER — PIOGLITAZONEHYDROCHLORIDE 15 MG/1
15 TABLET ORAL DAILY
Qty: 90 TABLET | Refills: 3 | Status: SHIPPED | OUTPATIENT
Start: 2022-04-04 | End: 2023-03-29

## 2022-04-04 NOTE — PROGRESS NOTES
CC: Ms. Swetha Thompson arrives today for management of Type 2 DM and review of chronic medical conditions, as listed in the visit diagnosis section of this encounter.     HPI: Ms. Swetha Thompson was diagnosed with Type 2 DM in her 50s. Initial treatment consisted of oral medications and insulin was added in ~2011. Actos was added by PCP in 2018. Insulin was discontinued in 2018, following hypoglycemia and significant decrease in insulin needs. However, her readings increased >150 mg/dL so she resumed low dose Tresiba. + FH of DM in mother, brother, sister, maternal GM. Past ER visit on 6/26/18 via EMS, due to hypoglycemia in the 30s, which caused loss of consciousness at home. Insulin doses were adjusted at this time. Tresiba later discontinued in 2019, due to hypoglycemia on low dose of 10 units daily.      Patient was last seen by me in January. At this time, glipzide was increased to twice daily, due to elevated fasting glucoses. Patient previously had hypoglycemia on low dose of Tresiba 10 units daily (2019).    She reports recent stress, as her niece passed away 2 weeks ago. She states she had been busy with family and diabetes was not her priority.     Previously, when she changed pharmacies, her pioglitazone prescription didn't transfer.     She just received a 90 day supply of glipizide XR.    BG readings are checked 1x/day, fasting only.           Hypoglycemia: No    Missing Insulin/PO medication doses: No     Dietary Habits: Eats 3 meals daily. Rare snacking lately. Avoids sugary beverages. Does drink coffee during the day but doesn't know how many cups she actually has.     Last DM education appointment: 5/2017        CURRENT DIABETIC MEDS: Metformin XR 1000 mg BID, glipizide XR 2.5 mg BID  Vial or pen: pen  Glucometer type: unsure    Previous DM meds:  Levemir  Novolog   Tresiba - hypoglycemia on low dose of 10 units  Pioglitazone - stopped after changing pharmacy    Last Eye Exam: 9/2020, +   "Dr. Shields.  Last Podiatry Exam: 2017    REVIEW OF SYSTEMS  Constitutional: no c/o weakness, fatigue, weight loss.   Cardiac: no chest pain, palpitations. Reports fast heart rate.   Respiratory: no dyspnea, cough.  GI: no c/o abdominal pain, nausea. Denies h/o pancreatitis.   Skin: no lesions or rashes.  Neuro: no numbness, tingling, paresthesias.   Endocrine: denies polyphagia, polydipsia, polyuria.     Personally reviewed Past Medical, Surgical, Social History.    Vital Signs  /80   Pulse (!) 116   Ht 5' 2" (1.575 m)   Wt 77.4 kg (170 lb 10.2 oz)   BMI 31.21 kg/m²      Personally reviewed the below labs:    Hemoglobin A1C   Date Value Ref Range Status   11/22/2021 7.3 (H) 4.0 - 5.6 % Final     Comment:     ADA Screening Guidelines:  5.7-6.4%  Consistent with prediabetes  >or=6.5%  Consistent with diabetes    High levels of fetal hemoglobin interfere with the HbA1C  assay. Heterozygous hemoglobin variants (HbS, HgC, etc)do  not significantly interfere with this assay.   However, presence of multiple variants may affect accuracy.     09/13/2021 7.1 (H) 4.0 - 5.6 % Final     Comment:     ADA Screening Guidelines:  5.7-6.4%  Consistent with prediabetes  >or=6.5%  Consistent with diabetes    High levels of fetal hemoglobin interfere with the HbA1C  assay. Heterozygous hemoglobin variants (HbS, HgC, etc)do  not significantly interfere with this assay.   However, presence of multiple variants may affect accuracy.     05/12/2021 7.0 (H) 4.0 - 5.6 % Final     Comment:     ADA Screening Guidelines:  5.7-6.4%  Consistent with prediabetes  >or=6.5%  Consistent with diabetes    High levels of fetal hemoglobin interfere with the HbA1C  assay. Heterozygous hemoglobin variants (HbS, HgC, etc)do  not significantly interfere with this assay.   However, presence of multiple variants may affect accuracy.         Chemistry        Component Value Date/Time     11/22/2021 1113    K 4.3 11/22/2021 1113     " 11/22/2021 1113    CO2 27 11/22/2021 1113    BUN 15 11/22/2021 1113    CREATININE 1.1 11/22/2021 1113     (H) 11/22/2021 1113        Component Value Date/Time    CALCIUM 9.4 11/22/2021 1113    ALKPHOS 53 (L) 11/22/2021 1113    AST 15 11/22/2021 1113    ALT 8 (L) 11/22/2021 1113    BILITOT 0.3 11/22/2021 1113    ESTGFRAFRICA 56.0 (A) 11/22/2021 1113    EGFRNONAA 48.5 (A) 11/22/2021 1113          Lab Results   Component Value Date    CHOL 188 05/12/2021    CHOL 189 05/15/2020    CHOL 165 12/06/2018     Lab Results   Component Value Date    HDL 64 05/12/2021    HDL 62 05/15/2020    HDL 56 12/06/2018     Lab Results   Component Value Date    LDLCALC 110.6 05/12/2021    LDLCALC 116.2 05/15/2020    LDLCALC 96.0 12/06/2018     Lab Results   Component Value Date    TRIG 67 05/12/2021    TRIG 54 05/15/2020    TRIG 65 12/06/2018     Lab Results   Component Value Date    CHOLHDL 34.0 05/12/2021    CHOLHDL 32.8 05/15/2020    CHOLHDL 33.9 12/06/2018       Lab Results   Component Value Date    MICALBCREAT 8.7 09/13/2021     Lab Results   Component Value Date    TSH 2.447 02/03/2020     CrCl cannot be calculated (Patient's most recent lab result is older than the maximum 7 days allowed.).    Vit D, 25-Hydroxy   Date Value Ref Range Status   09/13/2021 60 30 - 96 ng/mL Final     Comment:     Vitamin D deficiency.........<10 ng/mL                              Vitamin D insufficiency......10-29 ng/mL       Vitamin D sufficiency........> or equal to 30 ng/mL  Vitamin D toxicity............>100 ng/mL          PHYSICAL EXAMINATION  Constitutional: Appears well, no distress.  Neck: Supple, trachea midline.  Respiratory: CTA, even and unlabored.  Cardiovascular: + Tachycardia. Regular rhythm, no murmurs, no carotid bruits.   GI: active bowel sounds, no hernia  Skin: warm and dry  Neuro: oriented to person, place, time  Feet: appropriate footwear.        Goals      HEMOGLOBIN A1C < 7.5             Assessment/Plan  1. Type 2  diabetes mellitus with both eyes affected by mild nonproliferative diabetic retinopathy without macular edema, without long-term current use of insulin  -- A1c today. Fasting hyperglycemia continued. Previously tolerated pioglitazone well.  -- resume pioglitazone 15 mg daily  -- continue metformin, glipizide XR. May consider changing to glimepiride once she finishes new 90 day supply of glipizide.   -- check BG 1x/day.   -- schedule eye exam  -- takes ARB, statin   2. Type 2 diabetes mellitus with diabetic neuropathy -- maintain DM control.    3. Hypertension -- controlled  -- continue losartan   4. Tachycardia  -- Cardiology referral. However, she plans to discuss with PCP first before scheduling with cardiology.   -- I advised pt to reduce coffee intake to no more than 2 cups in the morning.       FOLLOW UP  Follow up in about 3 months (around 7/4/2022).  Patient instructed to bring BG logs to each follow up   Patient encouraged to call for any BG/medication issues, concerns, or questions.    Orders Placed This Encounter   Procedures    Hemoglobin A1C    Lipid Panel

## 2022-04-05 ENCOUNTER — TELEPHONE (OUTPATIENT)
Dept: ENDOCRINOLOGY | Facility: CLINIC | Age: 77
End: 2022-04-05
Payer: MEDICARE

## 2022-04-05 ENCOUNTER — PATIENT MESSAGE (OUTPATIENT)
Dept: FAMILY MEDICINE | Facility: CLINIC | Age: 77
End: 2022-04-05
Payer: MEDICARE

## 2022-04-05 NOTE — TELEPHONE ENCOUNTER
Please let patient know that A1c increased from 7.3% to 8.4%. Hopefully the pioglitazone will help her gain better control. After about 4 weeks on the pioglitazone, please advise her to notify me if morning blood sugars remain over 150.

## 2022-04-06 ENCOUNTER — TELEPHONE (OUTPATIENT)
Dept: FAMILY MEDICINE | Facility: CLINIC | Age: 77
End: 2022-04-06
Payer: MEDICARE

## 2022-04-06 NOTE — TELEPHONE ENCOUNTER
Progress Note - Pearl Wray 8/7/1931, 78 y o  female MRN: [id number]    Unit/Bed#: [facility] 269-02 Encounter: [id number]    Primary Care Provider: Gigi Nova MD   Date and time admitted to hospital: 12/13/2018  4:25 PM        Abnormality of gait   Assessment & Plan    Her gait is abnormal with diminished ground clearance and occasional balance impairment  We will proceed with full program to her gait quality and reduce fall risk  Her gait is improving with verbal cues and practice  Gastroesophageal reflux   Assessment & Plan    She continues on the proton pump inhibitor for GERD symptoms  Hypertensive urgency   Assessment & Plan    Her blood pressure was markedly elevated on admission  It is intermittently elevated  She continues on ARB and a calcium blocker  We will adjust medications and parameters as needed  It is important to avoid hypoperfusion  Transient cerebral ischemia   Assessment & Plan    The patient had a TIA which led to the admission to the hospital   She is currently on aspirin and Plavix and statin for prevention  We will closely monitor for symptoms and blood pressure for cerebral perfusion  * Physical deconditioning   Assessment & Plan    Patient was noted to have impaired mobility and balance and self-care during the acute hospitalization  She was admitted to the acute inpatient rehabilitation to address these issues  She needs to be independent with bathroom transfers at home  She currently needs supervision and cues as well as infrequent physical assistance for balance  We will continue with the rehabilitation program to achieve the above goal              Subjective/Objective     Subjective:   She feels well this morning  She reports good sleep and adequate appetite  She reports adequate bowel and bladder function  She feels that her balance and walking  and stamina and strength are improving    She denies any other significant ENT, ophthalmological, cardiac, Scheduled referral to cardiology.   pulmonary, GI, , hematologic, dermatologic, psychiatric, neurologic, and musculoskeletal difficulties other than those consistent with the past medical history and history of present illness  Objective:  Vitals: Blood pressure (!) 173/69, pulse (!) 52, temperature 98 1 °F (36 7 °C), temperature source Temporal, resp  rate 18, height 5' 2" (1 575 m), weight 51 kg (112 lb 8 oz), SpO2 98 %  ,Body mass index is 20 58 kg/m²  Intake/Output Summary (Last 24 hours) at 12/18/18 0918  Last data filed at 12/17/18 1238   Gross per 24 hour   Intake              240 ml   Output                0 ml   Net              240 ml       Invasive Devices          No matching active lines, drains, or airways          Physical Exam: Vital signs are noted  Blood pressure is elevated today  In general, she is a well-developed adequately nourished (BMI 20) woman sitting in the wheelchair eating breakfast   The head is normocephalic and atraumatic  The neck has functional range of motion  There are no carotid bruits noted  Mucous membranes are moist without erythema  Lungs are clear without wheeze  Abdomen is soft and nontender  Bowel sounds are present  Cardiac rhythm is regular without ectopy  Skin is intact without local lesions  Limb examination reveals no significant edema in both calves are nontender  The neurologic examination is nonfocal   Motor strength is generally 4+ over 5 but there is diminished endurance for prolonged activity  Lab, Imaging and other studies: I have personally reviewed pertinent reports      VTE Pharmacologic Prophylaxis: Enoxaparin (Lovenox)  VTE Mechanical Prophylaxis: sequential compression device

## 2022-04-08 ENCOUNTER — OFFICE VISIT (OUTPATIENT)
Dept: CARDIOLOGY | Facility: CLINIC | Age: 77
End: 2022-04-08
Payer: MEDICARE

## 2022-04-08 VITALS
WEIGHT: 173.06 LBS | DIASTOLIC BLOOD PRESSURE: 89 MMHG | SYSTOLIC BLOOD PRESSURE: 164 MMHG | HEIGHT: 62 IN | BODY MASS INDEX: 31.85 KG/M2 | HEART RATE: 117 BPM

## 2022-04-08 DIAGNOSIS — I20.89 OTHER FORMS OF ANGINA PECTORIS: ICD-10-CM

## 2022-04-08 DIAGNOSIS — I47.19: ICD-10-CM

## 2022-04-08 DIAGNOSIS — E66.9 OBESITY (BMI 30.0-34.9): ICD-10-CM

## 2022-04-08 DIAGNOSIS — R94.31 NONSPECIFIC ABNORMAL ELECTROCARDIOGRAM (ECG) (EKG): ICD-10-CM

## 2022-04-08 DIAGNOSIS — R00.0 TACHYCARDIA: ICD-10-CM

## 2022-04-08 DIAGNOSIS — I10 ESSENTIAL HYPERTENSION: Primary | ICD-10-CM

## 2022-04-08 PROCEDURE — 1160F RVW MEDS BY RX/DR IN RCRD: CPT | Mod: CPTII,S$GLB,, | Performed by: INTERNAL MEDICINE

## 2022-04-08 PROCEDURE — 3079F DIAST BP 80-89 MM HG: CPT | Mod: CPTII,S$GLB,, | Performed by: INTERNAL MEDICINE

## 2022-04-08 PROCEDURE — 93010 ELECTROCARDIOGRAM REPORT: CPT | Mod: S$GLB,,, | Performed by: INTERNAL MEDICINE

## 2022-04-08 PROCEDURE — 99999 PR PBB SHADOW E&M-EST. PATIENT-LVL V: CPT | Mod: PBBFAC,,, | Performed by: INTERNAL MEDICINE

## 2022-04-08 PROCEDURE — 3288F FALL RISK ASSESSMENT DOCD: CPT | Mod: CPTII,S$GLB,, | Performed by: INTERNAL MEDICINE

## 2022-04-08 PROCEDURE — 99999 PR PBB SHADOW E&M-EST. PATIENT-LVL V: ICD-10-PCS | Mod: PBBFAC,,, | Performed by: INTERNAL MEDICINE

## 2022-04-08 PROCEDURE — 93010 EKG 12-LEAD: ICD-10-PCS | Mod: S$GLB,,, | Performed by: INTERNAL MEDICINE

## 2022-04-08 PROCEDURE — 1126F AMNT PAIN NOTED NONE PRSNT: CPT | Mod: CPTII,S$GLB,, | Performed by: INTERNAL MEDICINE

## 2022-04-08 PROCEDURE — 1159F PR MEDICATION LIST DOCUMENTED IN MEDICAL RECORD: ICD-10-PCS | Mod: CPTII,S$GLB,, | Performed by: INTERNAL MEDICINE

## 2022-04-08 PROCEDURE — 3079F PR MOST RECENT DIASTOLIC BLOOD PRESSURE 80-89 MM HG: ICD-10-PCS | Mod: CPTII,S$GLB,, | Performed by: INTERNAL MEDICINE

## 2022-04-08 PROCEDURE — 3077F SYST BP >= 140 MM HG: CPT | Mod: CPTII,S$GLB,, | Performed by: INTERNAL MEDICINE

## 2022-04-08 PROCEDURE — 99204 PR OFFICE/OUTPT VISIT, NEW, LEVL IV, 45-59 MIN: ICD-10-PCS | Mod: S$GLB,,, | Performed by: INTERNAL MEDICINE

## 2022-04-08 PROCEDURE — 1101F PT FALLS ASSESS-DOCD LE1/YR: CPT | Mod: CPTII,S$GLB,, | Performed by: INTERNAL MEDICINE

## 2022-04-08 PROCEDURE — 1126F PR PAIN SEVERITY QUANTIFIED, NO PAIN PRESENT: ICD-10-PCS | Mod: CPTII,S$GLB,, | Performed by: INTERNAL MEDICINE

## 2022-04-08 PROCEDURE — 93005 ELECTROCARDIOGRAM TRACING: CPT | Mod: PO

## 2022-04-08 PROCEDURE — 1159F MED LIST DOCD IN RCRD: CPT | Mod: CPTII,S$GLB,, | Performed by: INTERNAL MEDICINE

## 2022-04-08 PROCEDURE — 99204 OFFICE O/P NEW MOD 45 MIN: CPT | Mod: S$GLB,,, | Performed by: INTERNAL MEDICINE

## 2022-04-08 PROCEDURE — 3288F PR FALLS RISK ASSESSMENT DOCUMENTED: ICD-10-PCS | Mod: CPTII,S$GLB,, | Performed by: INTERNAL MEDICINE

## 2022-04-08 PROCEDURE — 1160F PR REVIEW ALL MEDS BY PRESCRIBER/CLIN PHARMACIST DOCUMENTED: ICD-10-PCS | Mod: CPTII,S$GLB,, | Performed by: INTERNAL MEDICINE

## 2022-04-08 PROCEDURE — 3077F PR MOST RECENT SYSTOLIC BLOOD PRESSURE >= 140 MM HG: ICD-10-PCS | Mod: CPTII,S$GLB,, | Performed by: INTERNAL MEDICINE

## 2022-04-08 PROCEDURE — 1101F PR PT FALLS ASSESS DOC 0-1 FALLS W/OUT INJ PAST YR: ICD-10-PCS | Mod: CPTII,S$GLB,, | Performed by: INTERNAL MEDICINE

## 2022-04-08 RX ORDER — METOPROLOL SUCCINATE 50 MG/1
50 TABLET, EXTENDED RELEASE ORAL DAILY
Qty: 90 TABLET | Refills: 3 | Status: SHIPPED | OUTPATIENT
Start: 2022-04-08 | End: 2023-02-02

## 2022-04-08 NOTE — PROGRESS NOTES
Subjective:    Patient ID:  Swetha Thompson is a 76 y.o. female who presents for evaluation of [facility]      HPI76 yo BF referred for tachycardia . Patient unaware of any changes  But previous EKG showed same rhythm as one in 2018 and appears to be an atrial tachycardia. Patient has some BLISS and occasional CP. Denies palpitations, weak spells, and syncope    Review of Systems   Constitutional: Negative for decreased appetite, fever, malaise/fatigue, weight gain and weight loss.   HENT: Negative for hearing loss and nosebleeds.    Eyes: Negative for visual disturbance.   Cardiovascular: Positive for chest pain and dyspnea on exertion. Negative for claudication, cyanosis, irregular heartbeat, leg swelling, near-syncope, orthopnea, palpitations, paroxysmal nocturnal dyspnea and syncope.   Respiratory: Negative for cough, hemoptysis, shortness of breath, sleep disturbances due to breathing, snoring and wheezing.    Endocrine: Negative for cold intolerance, heat intolerance, polydipsia and polyuria.   Hematologic/Lymphatic: Negative for adenopathy and bleeding problem. Does not bruise/bleed easily.   Skin: Negative for color change, itching, poor wound healing, rash and suspicious lesions.   Musculoskeletal: Negative for arthritis, back pain, falls, joint pain, joint swelling, muscle cramps, muscle weakness and myalgias.   Gastrointestinal: Negative for bloating, abdominal pain, change in bowel habit, constipation, flatus, heartburn, hematemesis, hematochezia, hemorrhoids, jaundice, melena, nausea and vomiting.   Genitourinary: Negative for bladder incontinence, decreased libido, frequency, hematuria, hesitancy and urgency.   Neurological: Negative for brief paralysis, difficulty with concentration, excessive daytime sleepiness, dizziness, focal weakness, headaches, light-headedness, loss of balance, numbness, vertigo and weakness.   Psychiatric/Behavioral: Negative for altered mental status, depression and  "memory loss. The patient does not have insomnia and is not nervous/anxious.    Allergic/Immunologic: Negative for environmental allergies, hives and persistent infections.        Objective:    Physical Exam  Vitals and nursing note reviewed.   Constitutional:       Appearance: She is well-developed.      Comments: BP (!) 164/89 (BP Location: Left arm, Patient Position: Sitting, BP Method: Large (Automatic))   Pulse (!) 117   Ht 5' 2" (1.575 m)   Wt 78.5 kg (173 lb 1 oz)   BMI 31.65 kg/m²      HENT:      Head: Normocephalic and atraumatic.      Right Ear: External ear normal.      Left Ear: External ear normal.      Nose: Nose normal.   Eyes:      General: Lids are normal. No scleral icterus.        Right eye: No discharge.         Left eye: No discharge.      Conjunctiva/sclera: Conjunctivae normal.      Right eye: No hemorrhage.     Pupils: Pupils are equal, round, and reactive to light.   Neck:      Thyroid: No thyromegaly.      Vascular: No JVD.      Trachea: No tracheal deviation.   Cardiovascular:      Rate and Rhythm: Regular rhythm. Tachycardia present.      Pulses: Intact distal pulses.      Heart sounds: Normal heart sounds. No murmur heard.    No friction rub. No gallop.   Pulmonary:      Effort: Pulmonary effort is normal. No respiratory distress.      Breath sounds: Normal breath sounds. No wheezing or rales.   Chest:      Chest wall: No tenderness.   Breasts: Breasts are symmetrical.       Abdominal:      General: Bowel sounds are normal. There is no distension.      Palpations: Abdomen is soft. There is no hepatomegaly or mass.      Tenderness: There is no abdominal tenderness. There is no guarding or rebound.   Musculoskeletal:         General: No tenderness. Normal range of motion.      Cervical back: Normal range of motion and neck supple.   Lymphadenopathy:      Cervical: No cervical adenopathy.   Skin:     General: Skin is warm and dry.      Coloration: Skin is not pale.      Findings: No " erythema or rash.   Neurological:      Mental Status: She is alert and oriented to person, place, and time.      Cranial Nerves: No cranial nerve deficit.      Coordination: Coordination normal.      Deep Tendon Reflexes: Reflexes normal.   Psychiatric:         Behavior: Behavior normal.         Thought Content: Thought content normal.         Judgment: Judgment normal.           Assessment:       1. Essential hypertension    2. Tachycardia    3. Obesity (BMI 30.0-34.9)    4. Nonspecific abnormal electrocardiogram (ECG) (EKG)    5. Paroxysmal atrial tachycardia by electrocardiogram    6. Other forms of angina pectoris         Plan:    Add beta blocker     Orders Placed This Encounter   Procedures    Nuclear Stress - Cardiology Interpreted    IN OFFICE EKG 12-LEAD (to Muse)    Echo     Follow up in about 6 weeks (around 5/20/2022).

## 2022-05-09 ENCOUNTER — HOSPITAL ENCOUNTER (OUTPATIENT)
Dept: RADIOLOGY | Facility: HOSPITAL | Age: 77
Discharge: HOME | End: 2022-05-09
Attending: INTERNAL MEDICINE
Payer: MEDICARE

## 2022-05-09 ENCOUNTER — CLINICAL SUPPORT (OUTPATIENT)
Dept: CARDIOLOGY | Facility: HOSPITAL | Age: 77
End: 2022-05-09
Attending: INTERNAL MEDICINE
Payer: MEDICARE

## 2022-05-09 VITALS
DIASTOLIC BLOOD PRESSURE: 78 MMHG | WEIGHT: 173 LBS | BODY MASS INDEX: 31.83 KG/M2 | HEIGHT: 62 IN | SYSTOLIC BLOOD PRESSURE: 185 MMHG

## 2022-05-09 VITALS — BODY MASS INDEX: 31.83 KG/M2 | HEIGHT: 62 IN | WEIGHT: 173 LBS

## 2022-05-09 DIAGNOSIS — I47.19: ICD-10-CM

## 2022-05-09 DIAGNOSIS — R94.31 NONSPECIFIC ABNORMAL ELECTROCARDIOGRAM (ECG) (EKG): ICD-10-CM

## 2022-05-09 LAB
ASCENDING AORTA: 3.2 CM
AV INDEX (PROSTH): 0.8
AV MEAN GRADIENT: 3 MMHG
AV PEAK GRADIENT: 7 MMHG
AV VALVE AREA: 2.75 CM2
AV VELOCITY RATIO: 0.75
BSA FOR ECHO PROCEDURE: 1.85 M2
CV ECHO LV RWT: 0.34 CM
CV PHARM DOSE: 0.4 MG
CV STRESS BASE HR: 79 BPM
DIASTOLIC BLOOD PRESSURE: 78 MMHG
DOP CALC AO PEAK VEL: 1.3 M/S
DOP CALC AO VTI: 25.75 CM
DOP CALC LVOT AREA: 3.5 CM2
DOP CALC LVOT DIAMETER: 2.1 CM
DOP CALC LVOT PEAK VEL: 0.98 M/S
DOP CALC LVOT STROKE VOLUME: 70.9 CM3
DOP CALCLVOT PEAK VEL VTI: 20.48 CM
E WAVE DECELERATION TIME: 188.41 MSEC
E/A RATIO: 0.62
E/E' RATIO: 13.23 M/S
ECHO LV POSTERIOR WALL: 0.89 CM (ref 0.6–1.1)
EJECTION FRACTION: 60 %
FRACTIONAL SHORTENING: 30 % (ref 28–44)
INTERVENTRICULAR SEPTUM: 0.79 CM (ref 0.6–1.1)
LA MAJOR: 5.4 CM
LA MINOR: 4.86 CM
LA WIDTH: 3.41 CM
LEFT ATRIUM SIZE: 3.29 CM
LEFT ATRIUM VOLUME INDEX: 27.1 ML/M2
LEFT ATRIUM VOLUME: 48.78 CM3
LEFT INTERNAL DIMENSION IN SYSTOLE: 3.59 CM (ref 2.1–4)
LEFT VENTRICLE DIASTOLIC VOLUME INDEX: 70.79 ML/M2
LEFT VENTRICLE DIASTOLIC VOLUME: 127.42 ML
LEFT VENTRICLE MASS INDEX: 85 G/M2
LEFT VENTRICLE SYSTOLIC VOLUME INDEX: 30 ML/M2
LEFT VENTRICLE SYSTOLIC VOLUME: 54.06 ML
LEFT VENTRICULAR INTERNAL DIMENSION IN DIASTOLE: 5.16 CM (ref 3.5–6)
LEFT VENTRICULAR MASS: 152.54 G
LV LATERAL E/E' RATIO: 14.33 M/S
LV SEPTAL E/E' RATIO: 12.29 M/S
MV A" WAVE DURATION": 9.9 MSEC
MV PEAK A VEL: 1.39 M/S
MV PEAK E VEL: 0.86 M/S
MV STENOSIS PRESSURE HALF TIME: 54.64 MS
MV VALVE AREA P 1/2 METHOD: 4.03 CM2
NUC STRESS EJECTION FRACTION: 70 %
OHS CV CPX 1 MINUTE RECOVERY HEART RATE: 102 BPM
OHS CV CPX 85 PERCENT MAX PREDICTED HEART RATE MALE: 117
OHS CV CPX MAX PREDICTED HEART RATE: 137
OHS CV CPX PATIENT IS FEMALE: 1
OHS CV CPX PATIENT IS MALE: 0
OHS CV CPX PEAK DIASTOLIC BLOOD PRESSURE: 78 MMHG
OHS CV CPX PEAK HEAR RATE: 115 BPM
OHS CV CPX PEAK RATE PRESSURE PRODUCT: NORMAL
OHS CV CPX PEAK SYSTOLIC BLOOD PRESSURE: 185 MMHG
OHS CV CPX PERCENT MAX PREDICTED HEART RATE ACHIEVED: 84
OHS CV CPX RATE PRESSURE PRODUCT PRESENTING: NORMAL
OHS CV PHARM TIME: 949 MIN
PISA MRMAX VEL: 0.06 M/S
PISA TR MAX VEL: 2.49 M/S
PULM VEIN S/D RATIO: 1.9
PV PEAK D VEL: 0.31 M/S
PV PEAK S VEL: 0.59 M/S
RA MAJOR: 3.83 CM
RA PRESSURE: 3 MMHG
RA WIDTH: 2.57 CM
RIGHT VENTRICULAR END-DIASTOLIC DIMENSION: 2.64 CM
RV TISSUE DOPPLER FREE WALL SYSTOLIC VELOCITY 1 (APICAL 4 CHAMBER VIEW): 12.7 CM/S
SINUS: 3 CM
STJ: 3.03 CM
SYSTOLIC BLOOD PRESSURE: 185 MMHG
TDI LATERAL: 0.06 M/S
TDI SEPTAL: 0.07 M/S
TDI: 0.07 M/S
TR MAX PG: 25 MMHG
TRICUSPID ANNULAR PLANE SYSTOLIC EXCURSION: 2.31 CM
TV REST PULMONARY ARTERY PRESSURE: 28 MMHG

## 2022-05-09 PROCEDURE — 93018 CV STRESS TEST I&R ONLY: CPT | Mod: ,,, | Performed by: INTERNAL MEDICINE

## 2022-05-09 PROCEDURE — 78452 HT MUSCLE IMAGE SPECT MULT: CPT | Mod: 26,,, | Performed by: INTERNAL MEDICINE

## 2022-05-09 PROCEDURE — 93018 PR CARDIAC STRESS TST,INTERP/REPT ONLY: ICD-10-PCS | Mod: ,,, | Performed by: INTERNAL MEDICINE

## 2022-05-09 PROCEDURE — 93306 TTE W/DOPPLER COMPLETE: CPT | Mod: PO

## 2022-05-09 PROCEDURE — 78452 STRESS TEST WITH MYOCARDIAL PERFUSION (CUPID ONLY): ICD-10-PCS | Mod: 26,,, | Performed by: INTERNAL MEDICINE

## 2022-05-09 PROCEDURE — A9502 TC99M TETROFOSMIN: HCPCS | Mod: PO

## 2022-05-09 PROCEDURE — 63600175 PHARM REV CODE 636 W HCPCS: Mod: PO | Performed by: INTERNAL MEDICINE

## 2022-05-09 PROCEDURE — 93017 CV STRESS TEST TRACING ONLY: CPT | Mod: PO

## 2022-05-09 PROCEDURE — 93016 CV STRESS TEST SUPVJ ONLY: CPT | Mod: ,,, | Performed by: INTERNAL MEDICINE

## 2022-05-09 PROCEDURE — 93016 STRESS TEST WITH MYOCARDIAL PERFUSION (CUPID ONLY): ICD-10-PCS | Mod: ,,, | Performed by: INTERNAL MEDICINE

## 2022-05-09 RX ORDER — REGADENOSON 0.08 MG/ML
0.4 INJECTION, SOLUTION INTRAVENOUS ONCE
Status: COMPLETED | OUTPATIENT
Start: 2022-05-09 | End: 2022-05-09

## 2022-05-09 RX ADMIN — REGADENOSON 0.4 MG: 0.08 INJECTION, SOLUTION INTRAVENOUS at 09:05

## 2022-07-08 ENCOUNTER — LAB VISIT (OUTPATIENT)
Dept: LAB | Facility: HOSPITAL | Age: 77
End: 2022-07-08
Attending: NURSE PRACTITIONER
Payer: MEDICARE

## 2022-07-08 DIAGNOSIS — E11.3293 TYPE 2 DIABETES MELLITUS WITH BOTH EYES AFFECTED BY MILD NONPROLIFERATIVE RETINOPATHY WITHOUT MACULAR EDEMA, WITHOUT LONG-TERM CURRENT USE OF INSULIN: ICD-10-CM

## 2022-07-08 LAB
ANION GAP SERPL CALC-SCNC: 8 MMOL/L (ref 8–16)
BUN SERPL-MCNC: 12 MG/DL (ref 8–23)
CALCIUM SERPL-MCNC: 9.5 MG/DL (ref 8.7–10.5)
CHLORIDE SERPL-SCNC: 104 MMOL/L (ref 95–110)
CHOLEST SERPL-MCNC: 188 MG/DL (ref 120–199)
CHOLEST/HDLC SERPL: 3 {RATIO} (ref 2–5)
CO2 SERPL-SCNC: 25 MMOL/L (ref 23–29)
CREAT SERPL-MCNC: 1.3 MG/DL (ref 0.5–1.4)
EST. GFR  (AFRICAN AMERICAN): 45.4 ML/MIN/1.73 M^2
EST. GFR  (NON AFRICAN AMERICAN): 39.4 ML/MIN/1.73 M^2
ESTIMATED AVG GLUCOSE: 174 MG/DL (ref 68–131)
GLUCOSE SERPL-MCNC: 147 MG/DL (ref 70–110)
HBA1C MFR BLD: 7.7 % (ref 4–5.6)
HDLC SERPL-MCNC: 62 MG/DL (ref 40–75)
HDLC SERPL: 33 % (ref 20–50)
LDLC SERPL CALC-MCNC: 106.4 MG/DL (ref 63–159)
NONHDLC SERPL-MCNC: 126 MG/DL
POTASSIUM SERPL-SCNC: 4.2 MMOL/L (ref 3.5–5.1)
SODIUM SERPL-SCNC: 137 MMOL/L (ref 136–145)
TRIGL SERPL-MCNC: 98 MG/DL (ref 30–150)

## 2022-07-08 PROCEDURE — 80061 LIPID PANEL: CPT | Performed by: NURSE PRACTITIONER

## 2022-07-08 PROCEDURE — 36415 COLL VENOUS BLD VENIPUNCTURE: CPT | Mod: PO | Performed by: NURSE PRACTITIONER

## 2022-07-08 PROCEDURE — 80048 BASIC METABOLIC PNL TOTAL CA: CPT | Performed by: NURSE PRACTITIONER

## 2022-07-08 PROCEDURE — 83036 HEMOGLOBIN GLYCOSYLATED A1C: CPT | Performed by: NURSE PRACTITIONER

## 2022-07-15 ENCOUNTER — OFFICE VISIT (OUTPATIENT)
Dept: ENDOCRINOLOGY | Facility: CLINIC | Age: 77
End: 2022-07-15
Payer: MEDICARE

## 2022-07-15 VITALS
WEIGHT: 176.13 LBS | DIASTOLIC BLOOD PRESSURE: 70 MMHG | HEIGHT: 62 IN | HEART RATE: 87 BPM | SYSTOLIC BLOOD PRESSURE: 134 MMHG | BODY MASS INDEX: 32.41 KG/M2

## 2022-07-15 DIAGNOSIS — R00.0 TACHYCARDIA: ICD-10-CM

## 2022-07-15 DIAGNOSIS — E11.3293 TYPE 2 DIABETES MELLITUS WITH BOTH EYES AFFECTED BY MILD NONPROLIFERATIVE RETINOPATHY WITHOUT MACULAR EDEMA, WITHOUT LONG-TERM CURRENT USE OF INSULIN: Primary | ICD-10-CM

## 2022-07-15 DIAGNOSIS — E11.40 TYPE 2 DIABETES MELLITUS WITH DIABETIC NEUROPATHY, WITHOUT LONG-TERM CURRENT USE OF INSULIN: ICD-10-CM

## 2022-07-15 DIAGNOSIS — I10 ESSENTIAL HYPERTENSION: ICD-10-CM

## 2022-07-15 PROCEDURE — 99999 PR PBB SHADOW E&M-EST. PATIENT-LVL V: ICD-10-PCS | Mod: PBBFAC,,, | Performed by: NURSE PRACTITIONER

## 2022-07-15 PROCEDURE — 1101F PT FALLS ASSESS-DOCD LE1/YR: CPT | Mod: CPTII,S$GLB,, | Performed by: NURSE PRACTITIONER

## 2022-07-15 PROCEDURE — 3288F PR FALLS RISK ASSESSMENT DOCUMENTED: ICD-10-PCS | Mod: CPTII,S$GLB,, | Performed by: NURSE PRACTITIONER

## 2022-07-15 PROCEDURE — 3078F PR MOST RECENT DIASTOLIC BLOOD PRESSURE < 80 MM HG: ICD-10-PCS | Mod: CPTII,S$GLB,, | Performed by: NURSE PRACTITIONER

## 2022-07-15 PROCEDURE — 3075F PR MOST RECENT SYSTOLIC BLOOD PRESS GE 130-139MM HG: ICD-10-PCS | Mod: CPTII,S$GLB,, | Performed by: NURSE PRACTITIONER

## 2022-07-15 PROCEDURE — 99214 PR OFFICE/OUTPT VISIT, EST, LEVL IV, 30-39 MIN: ICD-10-PCS | Mod: S$GLB,,, | Performed by: NURSE PRACTITIONER

## 2022-07-15 PROCEDURE — 3075F SYST BP GE 130 - 139MM HG: CPT | Mod: CPTII,S$GLB,, | Performed by: NURSE PRACTITIONER

## 2022-07-15 PROCEDURE — 99999 PR PBB SHADOW E&M-EST. PATIENT-LVL V: CPT | Mod: PBBFAC,,, | Performed by: NURSE PRACTITIONER

## 2022-07-15 PROCEDURE — 1125F PR PAIN SEVERITY QUANTIFIED, PAIN PRESENT: ICD-10-PCS | Mod: CPTII,S$GLB,, | Performed by: NURSE PRACTITIONER

## 2022-07-15 PROCEDURE — 1160F RVW MEDS BY RX/DR IN RCRD: CPT | Mod: CPTII,S$GLB,, | Performed by: NURSE PRACTITIONER

## 2022-07-15 PROCEDURE — 3288F FALL RISK ASSESSMENT DOCD: CPT | Mod: CPTII,S$GLB,, | Performed by: NURSE PRACTITIONER

## 2022-07-15 PROCEDURE — 1160F PR REVIEW ALL MEDS BY PRESCRIBER/CLIN PHARMACIST DOCUMENTED: ICD-10-PCS | Mod: CPTII,S$GLB,, | Performed by: NURSE PRACTITIONER

## 2022-07-15 PROCEDURE — 1159F MED LIST DOCD IN RCRD: CPT | Mod: CPTII,S$GLB,, | Performed by: NURSE PRACTITIONER

## 2022-07-15 PROCEDURE — 1125F AMNT PAIN NOTED PAIN PRSNT: CPT | Mod: CPTII,S$GLB,, | Performed by: NURSE PRACTITIONER

## 2022-07-15 PROCEDURE — 1101F PR PT FALLS ASSESS DOC 0-1 FALLS W/OUT INJ PAST YR: ICD-10-PCS | Mod: CPTII,S$GLB,, | Performed by: NURSE PRACTITIONER

## 2022-07-15 PROCEDURE — 3078F DIAST BP <80 MM HG: CPT | Mod: CPTII,S$GLB,, | Performed by: NURSE PRACTITIONER

## 2022-07-15 PROCEDURE — 1159F PR MEDICATION LIST DOCUMENTED IN MEDICAL RECORD: ICD-10-PCS | Mod: CPTII,S$GLB,, | Performed by: NURSE PRACTITIONER

## 2022-07-15 PROCEDURE — 99214 OFFICE O/P EST MOD 30 MIN: CPT | Mod: S$GLB,,, | Performed by: NURSE PRACTITIONER

## 2022-07-15 RX ORDER — GLIMEPIRIDE 1 MG/1
1 TABLET ORAL
Qty: 90 TABLET | Refills: 3 | Status: SHIPPED | OUTPATIENT
Start: 2022-07-15 | End: 2023-11-14

## 2022-07-15 NOTE — PROGRESS NOTES
CC: Ms. Swetha Thompson arrives today for management of Type 2 DM and review of chronic medical conditions, as listed in the visit diagnosis section of this encounter.     HPI: Ms. Swetha Thompson was diagnosed with Type 2 DM in her 50s. Initial treatment consisted of oral medications and insulin was added in ~2011. Actos was added by PCP in 2018. Insulin was discontinued in 2018, following hypoglycemia and significant decrease in insulin needs. However, her readings increased >150 mg/dL so she resumed low dose Tresiba. + FH of DM in mother, brother, sister, maternal GM. Past ER visit on 6/26/18 via EMS, due to hypoglycemia in the 30s, which caused loss of consciousness at home. Insulin doses were adjusted at this time. Tresiba later discontinued in 2019, due to hypoglycemia on low dose of 10 units daily.      Patient was last seen by me in April.  At this time, pioglitazone was added.     BG readings are checked 1x/day, fasting only.             Hypoglycemia: No    Missing Insulin/PO medication doses: No     Dietary Habits: Eats 2 meals daily. Usually skips lunch and has peaches (canned in juice) and cheese. Occasional juice.    Last DM education appointment: 5/2017        CURRENT DIABETIC MEDS: Metformin XR 1000 mg BID, glipizide XR 2.5 mg BID, pioglitazone 15 mg daily  Vial or pen: pen  Glucometer type: unsure    Previous DM meds:  Levemir  Novolog   Tresiba - hypoglycemia on low dose of 10 units  Pioglitazone - stopped after changing pharmacy    Last Eye Exam: 2021, + DR. Dr. Shields. Plans to schedule appt for this year.  Last Podiatry Exam: 2017    REVIEW OF SYSTEMS  Constitutional: no c/o weakness, fatigue, weight loss. + weight gain  Cardiac: no chest pain, palpitations.  Respiratory: no dyspnea, cough.  GI: no c/o abdominal pain, nausea. Denies h/o pancreatitis.   Skin: no lesions or rashes.  Neuro: no numbness, tingling, paresthesias.   Endocrine: denies polyphagia, polydipsia, polyuria.  "    Personally reviewed Past Medical, Surgical, Social History.    Vital Signs  /70   Pulse 87   Ht 5' 2" (1.575 m)   Wt 79.9 kg (176 lb 2.4 oz)   BMI 32.22 kg/m²      Personally reviewed the below labs:    Hemoglobin A1C   Date Value Ref Range Status   07/08/2022 7.7 (H) 4.0 - 5.6 % Final     Comment:     ADA Screening Guidelines:  5.7-6.4%  Consistent with prediabetes  >or=6.5%  Consistent with diabetes    High levels of fetal hemoglobin interfere with the HbA1C  assay. Heterozygous hemoglobin variants (HbS, HgC, etc)do  not significantly interfere with this assay.   However, presence of multiple variants may affect accuracy.     04/04/2022 8.4 (H) 4.0 - 5.6 % Final     Comment:     ADA Screening Guidelines:  5.7-6.4%  Consistent with prediabetes  >or=6.5%  Consistent with diabetes    High levels of fetal hemoglobin interfere with the HbA1C  assay. Heterozygous hemoglobin variants (HbS, HgC, etc)do  not significantly interfere with this assay.   However, presence of multiple variants may affect accuracy.     11/22/2021 7.3 (H) 4.0 - 5.6 % Final     Comment:     ADA Screening Guidelines:  5.7-6.4%  Consistent with prediabetes  >or=6.5%  Consistent with diabetes    High levels of fetal hemoglobin interfere with the HbA1C  assay. Heterozygous hemoglobin variants (HbS, HgC, etc)do  not significantly interfere with this assay.   However, presence of multiple variants may affect accuracy.         Chemistry        Component Value Date/Time     07/08/2022 0750    K 4.2 07/08/2022 0750     07/08/2022 0750    CO2 25 07/08/2022 0750    BUN 12 07/08/2022 0750    CREATININE 1.3 07/08/2022 0750     (H) 07/08/2022 0750        Component Value Date/Time    CALCIUM 9.5 07/08/2022 0750    ALKPHOS 53 (L) 11/22/2021 1113    AST 15 11/22/2021 1113    ALT 8 (L) 11/22/2021 1113    BILITOT 0.3 11/22/2021 1113    ESTGFRAFRICA 45.4 (A) 07/08/2022 0750    EGFRNONAA 39.4 (A) 07/08/2022 0750          Lab Results "   Component Value Date    CHOL 188 07/08/2022    CHOL 188 05/12/2021    CHOL 189 05/15/2020     Lab Results   Component Value Date    HDL 62 07/08/2022    HDL 64 05/12/2021    HDL 62 05/15/2020     Lab Results   Component Value Date    LDLCALC 106.4 07/08/2022    LDLCALC 110.6 05/12/2021    LDLCALC 116.2 05/15/2020     Lab Results   Component Value Date    TRIG 98 07/08/2022    TRIG 67 05/12/2021    TRIG 54 05/15/2020     Lab Results   Component Value Date    CHOLHDL 33.0 07/08/2022    CHOLHDL 34.0 05/12/2021    CHOLHDL 32.8 05/15/2020       Lab Results   Component Value Date    MICALBCREAT 8.7 09/13/2021     Lab Results   Component Value Date    TSH 2.447 02/03/2020     CrCl cannot be calculated (Patient's most recent lab result is older than the maximum 7 days allowed.).    Vit D, 25-Hydroxy   Date Value Ref Range Status   09/13/2021 60 30 - 96 ng/mL Final     Comment:     Vitamin D deficiency.........<10 ng/mL                              Vitamin D insufficiency......10-29 ng/mL       Vitamin D sufficiency........> or equal to 30 ng/mL  Vitamin D toxicity............>100 ng/mL          PHYSICAL EXAMINATION  Constitutional: Appears well, no distress.  Neck: Supple, trachea midline.  Respiratory: CTA, even and unlabored.  Cardiovascular: RRR, no murmurs.  GI: active bowel sounds, no hernia  Skin: warm and dry  Neuro: oriented to person, place, time  Feet: appropriate footwear.        Goals      HEMOGLOBIN A1C < 7.5             Assessment/Plan  1. 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 Fasting glucoses remain elevated.   -- will change glipizide XR to glimepiride 1 mg daily.   -- continue metformin, pioglitazone.  -- check BG 1x/day, alternating times.   -- schedule eye exam and have report faxed  -- takes ARB, statin   2. Type 2 diabetes mellitus with diabetic neuropathy --  maintain DM control.    3. Hypertension -- controlled  -- continue losartan       FOLLOW UP  Follow up in about 3 months (around 10/15/2022).  Patient instructed to bring BG logs to each follow up   Patient encouraged to call for any BG/medication issues, concerns, or questions.    Orders Placed This Encounter   Procedures    Hemoglobin A1C    Microalbumin/Creatinine Ratio, Urine    Comprehensive Metabolic Panel

## 2022-07-15 NOTE — PATIENT INSTRUCTIONS
STOP glipizide.     START glimepiride 1 mg before breakfast only. Take ~ 30 min before breakfast.     Continue metformin XR and pioglitazone.

## 2022-10-10 ENCOUNTER — LAB VISIT (OUTPATIENT)
Dept: LAB | Facility: HOSPITAL | Age: 77
End: 2022-10-10
Payer: MEDICARE

## 2022-10-10 DIAGNOSIS — E11.3293 TYPE 2 DIABETES MELLITUS WITH BOTH EYES AFFECTED BY MILD NONPROLIFERATIVE RETINOPATHY WITHOUT MACULAR EDEMA, WITHOUT LONG-TERM CURRENT USE OF INSULIN: ICD-10-CM

## 2022-10-17 ENCOUNTER — OFFICE VISIT (OUTPATIENT)
Dept: ENDOCRINOLOGY | Facility: CLINIC | Age: 77
End: 2022-10-17
Payer: MEDICARE

## 2022-10-17 VITALS
DIASTOLIC BLOOD PRESSURE: 64 MMHG | WEIGHT: 182.88 LBS | HEART RATE: 73 BPM | SYSTOLIC BLOOD PRESSURE: 136 MMHG | HEIGHT: 62 IN | BODY MASS INDEX: 33.65 KG/M2

## 2022-10-17 DIAGNOSIS — N18.31 TYPE 2 DIABETES MELLITUS WITH STAGE 3A CHRONIC KIDNEY DISEASE, WITHOUT LONG-TERM CURRENT USE OF INSULIN: ICD-10-CM

## 2022-10-17 DIAGNOSIS — E11.22 TYPE 2 DIABETES MELLITUS WITH STAGE 3A CHRONIC KIDNEY DISEASE, WITHOUT LONG-TERM CURRENT USE OF INSULIN: ICD-10-CM

## 2022-10-17 DIAGNOSIS — I10 ESSENTIAL HYPERTENSION: ICD-10-CM

## 2022-10-17 DIAGNOSIS — E11.3293 TYPE 2 DIABETES MELLITUS WITH BOTH EYES AFFECTED BY MILD NONPROLIFERATIVE RETINOPATHY WITHOUT MACULAR EDEMA, WITHOUT LONG-TERM CURRENT USE OF INSULIN: Primary | ICD-10-CM

## 2022-10-17 DIAGNOSIS — E11.40 TYPE 2 DIABETES MELLITUS WITH DIABETIC NEUROPATHY, WITHOUT LONG-TERM CURRENT USE OF INSULIN: ICD-10-CM

## 2022-10-17 PROCEDURE — 99214 PR OFFICE/OUTPT VISIT, EST, LEVL IV, 30-39 MIN: ICD-10-PCS | Mod: S$GLB,,, | Performed by: NURSE PRACTITIONER

## 2022-10-17 PROCEDURE — 3075F SYST BP GE 130 - 139MM HG: CPT | Mod: CPTII,S$GLB,, | Performed by: NURSE PRACTITIONER

## 2022-10-17 PROCEDURE — 1126F AMNT PAIN NOTED NONE PRSNT: CPT | Mod: CPTII,S$GLB,, | Performed by: NURSE PRACTITIONER

## 2022-10-17 PROCEDURE — 1101F PT FALLS ASSESS-DOCD LE1/YR: CPT | Mod: CPTII,S$GLB,, | Performed by: NURSE PRACTITIONER

## 2022-10-17 PROCEDURE — 1160F RVW MEDS BY RX/DR IN RCRD: CPT | Mod: CPTII,S$GLB,, | Performed by: NURSE PRACTITIONER

## 2022-10-17 PROCEDURE — 3288F FALL RISK ASSESSMENT DOCD: CPT | Mod: CPTII,S$GLB,, | Performed by: NURSE PRACTITIONER

## 2022-10-17 PROCEDURE — 1126F PR PAIN SEVERITY QUANTIFIED, NO PAIN PRESENT: ICD-10-PCS | Mod: CPTII,S$GLB,, | Performed by: NURSE PRACTITIONER

## 2022-10-17 PROCEDURE — 1159F PR MEDICATION LIST DOCUMENTED IN MEDICAL RECORD: ICD-10-PCS | Mod: CPTII,S$GLB,, | Performed by: NURSE PRACTITIONER

## 2022-10-17 PROCEDURE — 3078F DIAST BP <80 MM HG: CPT | Mod: CPTII,S$GLB,, | Performed by: NURSE PRACTITIONER

## 2022-10-17 PROCEDURE — 3288F PR FALLS RISK ASSESSMENT DOCUMENTED: ICD-10-PCS | Mod: CPTII,S$GLB,, | Performed by: NURSE PRACTITIONER

## 2022-10-17 PROCEDURE — 99214 OFFICE O/P EST MOD 30 MIN: CPT | Mod: S$GLB,,, | Performed by: NURSE PRACTITIONER

## 2022-10-17 PROCEDURE — 1160F PR REVIEW ALL MEDS BY PRESCRIBER/CLIN PHARMACIST DOCUMENTED: ICD-10-PCS | Mod: CPTII,S$GLB,, | Performed by: NURSE PRACTITIONER

## 2022-10-17 PROCEDURE — 1159F MED LIST DOCD IN RCRD: CPT | Mod: CPTII,S$GLB,, | Performed by: NURSE PRACTITIONER

## 2022-10-17 PROCEDURE — 3075F PR MOST RECENT SYSTOLIC BLOOD PRESS GE 130-139MM HG: ICD-10-PCS | Mod: CPTII,S$GLB,, | Performed by: NURSE PRACTITIONER

## 2022-10-17 PROCEDURE — 3078F PR MOST RECENT DIASTOLIC BLOOD PRESSURE < 80 MM HG: ICD-10-PCS | Mod: CPTII,S$GLB,, | Performed by: NURSE PRACTITIONER

## 2022-10-17 PROCEDURE — 99999 PR PBB SHADOW E&M-EST. PATIENT-LVL III: CPT | Mod: PBBFAC,,, | Performed by: NURSE PRACTITIONER

## 2022-10-17 PROCEDURE — 99999 PR PBB SHADOW E&M-EST. PATIENT-LVL III: ICD-10-PCS | Mod: PBBFAC,,, | Performed by: NURSE PRACTITIONER

## 2022-10-17 PROCEDURE — 1101F PR PT FALLS ASSESS DOC 0-1 FALLS W/OUT INJ PAST YR: ICD-10-PCS | Mod: CPTII,S$GLB,, | Performed by: NURSE PRACTITIONER

## 2022-10-17 RX ORDER — GLIPIZIDE 2.5 MG/1
TABLET, EXTENDED RELEASE ORAL
COMMUNITY
End: 2022-10-17

## 2022-10-17 NOTE — PROGRESS NOTES
CC: Ms. Swetha Thompson arrives today for management of Type 2 DM and review of chronic medical conditions, as listed in the visit diagnosis section of this encounter.     HPI: Ms. Swetha Thompson was diagnosed with Type 2 DM in her 50s. Initial treatment consisted of oral medications and insulin was added in ~. Actos was added by PCP in 2018. Insulin was discontinued in 2018, following hypoglycemia and significant decrease in insulin needs. However, her readings increased >150 mg/dL so she resumed low dose Tresiba. + FH of DM in mother, brother, sister, maternal GM. Past ER visit on 18 via EMS, due to hypoglycemia in the 30s, which caused loss of consciousness at home. Insulin doses were adjusted at this time. Tresiba later discontinued in , due to hypoglycemia on low dose of 10 units daily.      Patient was last seen by me in July.  At this time, glipizide XR was changed to low dose glimepiride.     BG readings are checked 2x/day, fasting only. Reports the following:  Fastin-130  Dinner: 130    Hypoglycemia: No    Missing Insulin/PO medication doses: No    Exercise: walks with friend for 45 min every evening.     Dietary Habits: Eats 2 meals daily - late breakfast and early dinner. Rare snacking. Drinks 0-1 glasses of juice per day.    Last DM education appointment: 2017        CURRENT DIABETIC MEDS: Metformin XR 1000 mg BID, glipizide 1 mg daily, pioglitazone 15 mg daily  Vial or pen: pen  Glucometer type: unsure    Previous DM meds:  Levemir  Novolog   Tresiba - hypoglycemia on low dose of 10 units  Glipizide XR    Last Eye Exam: , + DR. Dr. Shields. Plans to schedule appt for this year.  Last Podiatry Exam:     REVIEW OF SYSTEMS  Constitutional: no c/o weakness, fatigue, weight loss. + 6# weight gain  Cardiac: no chest pain, palpitations.  Respiratory: no dyspnea, cough.  GI: no c/o abdominal pain, nausea. Denies h/o pancreatitis.   Skin: no lesions or rashes.  Neuro: no  "numbness, tingling, paresthesias.   Endocrine: denies polyphagia, polydipsia, polyuria.     Personally reviewed Past Medical, Surgical, Social History.    Vital Signs  /64   Pulse 73   Ht 5' 2" (1.575 m)   Wt 83 kg (182 lb 14 oz)   BMI 33.45 kg/m²      Personally reviewed the below labs:    Hemoglobin A1C   Date Value Ref Range Status   10/10/2022 6.8 (H) 4.0 - 5.6 % Final     Comment:     ADA Screening Guidelines:  5.7-6.4%  Consistent with prediabetes  >or=6.5%  Consistent with diabetes    High levels of fetal hemoglobin interfere with the HbA1C  assay. Heterozygous hemoglobin variants (HbS, HgC, etc)do  not significantly interfere with this assay.   However, presence of multiple variants may affect accuracy.     07/08/2022 7.7 (H) 4.0 - 5.6 % Final     Comment:     ADA Screening Guidelines:  5.7-6.4%  Consistent with prediabetes  >or=6.5%  Consistent with diabetes    High levels of fetal hemoglobin interfere with the HbA1C  assay. Heterozygous hemoglobin variants (HbS, HgC, etc)do  not significantly interfere with this assay.   However, presence of multiple variants may affect accuracy.     04/04/2022 8.4 (H) 4.0 - 5.6 % Final     Comment:     ADA Screening Guidelines:  5.7-6.4%  Consistent with prediabetes  >or=6.5%  Consistent with diabetes    High levels of fetal hemoglobin interfere with the HbA1C  assay. Heterozygous hemoglobin variants (HbS, HgC, etc)do  not significantly interfere with this assay.   However, presence of multiple variants may affect accuracy.         Chemistry        Component Value Date/Time     10/10/2022 0831    K 3.9 10/10/2022 0831     10/10/2022 0831    CO2 28 10/10/2022 0831    BUN 14 10/10/2022 0831    CREATININE 1.2 10/10/2022 0831     (H) 10/10/2022 0831        Component Value Date/Time    CALCIUM 9.3 10/10/2022 0831    ALKPHOS 47 (L) 10/10/2022 0831    AST 14 10/10/2022 0831    ALT 10 10/10/2022 0831    BILITOT 0.4 10/10/2022 0831    PRO 45.4 " (A) 07/08/2022 0750    EGFRNONAA 39.4 (A) 07/08/2022 0750          Lab Results   Component Value Date    CHOL 188 07/08/2022    CHOL 188 05/12/2021    CHOL 189 05/15/2020     Lab Results   Component Value Date    HDL 62 07/08/2022    HDL 64 05/12/2021    HDL 62 05/15/2020     Lab Results   Component Value Date    LDLCALC 106.4 07/08/2022    LDLCALC 110.6 05/12/2021    LDLCALC 116.2 05/15/2020     Lab Results   Component Value Date    TRIG 98 07/08/2022    TRIG 67 05/12/2021    TRIG 54 05/15/2020     Lab Results   Component Value Date    CHOLHDL 33.0 07/08/2022    CHOLHDL 34.0 05/12/2021    CHOLHDL 32.8 05/15/2020       Lab Results   Component Value Date    MICALBCREAT 8.7 09/13/2021     Lab Results   Component Value Date    TSH 2.447 02/03/2020     CrCl cannot be calculated (Patient's most recent lab result is older than the maximum 7 days allowed.).    Vit D, 25-Hydroxy   Date Value Ref Range Status   09/13/2021 60 30 - 96 ng/mL Final     Comment:     Vitamin D deficiency.........<10 ng/mL                              Vitamin D insufficiency......10-29 ng/mL       Vitamin D sufficiency........> or equal to 30 ng/mL  Vitamin D toxicity............>100 ng/mL          PHYSICAL EXAMINATION  Constitutional: Appears well, no distress.  Neck: Supple, trachea midline.  Respiratory: CTA, even and unlabored.  Cardiovascular: RRR, no murmurs.  GI: active bowel sounds, no hernia  Skin: warm and dry  Neuro: oriented to person, place, time  Feet: appropriate footwear.        Goals        HEMOGLOBIN A1C < 7.5               Assessment/Plan  1. 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   -- continue metformin, pioglitazone, glimepiride.  -- check BG 1x/day, alternating times.   -- schedule eye exam and have report faxed  -- takes ARB, statin   2. Type 2 diabetes mellitus with  diabetic neuropathy -- maintain DM control.    3. Type 2 diabetes mellitus with stage 3a chronic kidney disease, without long-term current use of insulin -- stable  -- maintain DM control   4. Hypertension -- controlled  -- continue losartan       FOLLOW UP  Follow up in about 4 months (around 2/17/2023).  Patient instructed to bring BG logs to each follow up   Patient encouraged to call for any BG/medication issues, concerns, or questions.    Orders Placed This Encounter   Procedures    Hemoglobin A1C    TSH    Basic Metabolic Panel    Microalbumin/Creatinine Ratio, Urine

## 2023-02-13 DIAGNOSIS — R00.0 TACHYCARDIA: Primary | ICD-10-CM

## 2023-03-16 ENCOUNTER — LAB VISIT (OUTPATIENT)
Dept: LAB | Facility: HOSPITAL | Age: 78
End: 2023-03-16
Attending: NURSE PRACTITIONER
Payer: MEDICARE

## 2023-03-16 DIAGNOSIS — E11.3293 TYPE 2 DIABETES MELLITUS WITH BOTH EYES AFFECTED BY MILD NONPROLIFERATIVE RETINOPATHY WITHOUT MACULAR EDEMA, WITHOUT LONG-TERM CURRENT USE OF INSULIN: ICD-10-CM

## 2023-03-16 LAB
ANION GAP SERPL CALC-SCNC: 11 MMOL/L (ref 8–16)
BUN SERPL-MCNC: 11 MG/DL (ref 8–23)
CALCIUM SERPL-MCNC: 8.8 MG/DL (ref 8.7–10.5)
CHLORIDE SERPL-SCNC: 106 MMOL/L (ref 95–110)
CO2 SERPL-SCNC: 24 MMOL/L (ref 23–29)
CREAT SERPL-MCNC: 1.3 MG/DL (ref 0.5–1.4)
EST. GFR  (NO RACE VARIABLE): 41.8 ML/MIN/1.73 M^2
ESTIMATED AVG GLUCOSE: 146 MG/DL (ref 68–131)
GLUCOSE SERPL-MCNC: 125 MG/DL (ref 70–110)
HBA1C MFR BLD: 6.7 % (ref 4–5.6)
POTASSIUM SERPL-SCNC: 3.8 MMOL/L (ref 3.5–5.1)
SODIUM SERPL-SCNC: 141 MMOL/L (ref 136–145)
T4 FREE SERPL-MCNC: 1.1 NG/DL (ref 0.71–1.51)
TSH SERPL DL<=0.005 MIU/L-ACNC: 4.98 UIU/ML (ref 0.4–4)

## 2023-03-16 PROCEDURE — 80048 BASIC METABOLIC PNL TOTAL CA: CPT | Performed by: NURSE PRACTITIONER

## 2023-03-16 PROCEDURE — 36415 COLL VENOUS BLD VENIPUNCTURE: CPT | Mod: PO | Performed by: NURSE PRACTITIONER

## 2023-03-16 PROCEDURE — 84443 ASSAY THYROID STIM HORMONE: CPT | Performed by: NURSE PRACTITIONER

## 2023-03-16 PROCEDURE — 83036 HEMOGLOBIN GLYCOSYLATED A1C: CPT | Performed by: NURSE PRACTITIONER

## 2023-03-16 PROCEDURE — 84439 ASSAY OF FREE THYROXINE: CPT | Performed by: NURSE PRACTITIONER

## 2023-03-22 ENCOUNTER — OFFICE VISIT (OUTPATIENT)
Dept: PODIATRY | Facility: CLINIC | Age: 78
End: 2023-03-22
Payer: MEDICARE

## 2023-03-22 VITALS — HEIGHT: 62 IN | BODY MASS INDEX: 33.49 KG/M2 | WEIGHT: 182 LBS

## 2023-03-22 DIAGNOSIS — E11.36 TYPE 2 DIABETES MELLITUS WITH DIABETIC CATARACT, WITH LONG-TERM CURRENT USE OF INSULIN: Primary | ICD-10-CM

## 2023-03-22 DIAGNOSIS — Z79.4 TYPE 2 DIABETES MELLITUS WITH DIABETIC CATARACT, WITH LONG-TERM CURRENT USE OF INSULIN: Primary | ICD-10-CM

## 2023-03-22 DIAGNOSIS — M25.571 PAIN AND SWELLING OF RIGHT ANKLE: ICD-10-CM

## 2023-03-22 DIAGNOSIS — M25.471 PAIN AND SWELLING OF RIGHT ANKLE: ICD-10-CM

## 2023-03-22 PROCEDURE — 1101F PR PT FALLS ASSESS DOC 0-1 FALLS W/OUT INJ PAST YR: ICD-10-PCS | Mod: CPTII,S$GLB,, | Performed by: STUDENT IN AN ORGANIZED HEALTH CARE EDUCATION/TRAINING PROGRAM

## 2023-03-22 PROCEDURE — 1101F PT FALLS ASSESS-DOCD LE1/YR: CPT | Mod: CPTII,S$GLB,, | Performed by: STUDENT IN AN ORGANIZED HEALTH CARE EDUCATION/TRAINING PROGRAM

## 2023-03-22 PROCEDURE — 99203 PR OFFICE/OUTPT VISIT, NEW, LEVL III, 30-44 MIN: ICD-10-PCS | Mod: S$GLB,,, | Performed by: STUDENT IN AN ORGANIZED HEALTH CARE EDUCATION/TRAINING PROGRAM

## 2023-03-22 PROCEDURE — 1159F PR MEDICATION LIST DOCUMENTED IN MEDICAL RECORD: ICD-10-PCS | Mod: CPTII,S$GLB,, | Performed by: STUDENT IN AN ORGANIZED HEALTH CARE EDUCATION/TRAINING PROGRAM

## 2023-03-22 PROCEDURE — 1125F PR PAIN SEVERITY QUANTIFIED, PAIN PRESENT: ICD-10-PCS | Mod: CPTII,S$GLB,, | Performed by: STUDENT IN AN ORGANIZED HEALTH CARE EDUCATION/TRAINING PROGRAM

## 2023-03-22 PROCEDURE — 1160F RVW MEDS BY RX/DR IN RCRD: CPT | Mod: CPTII,S$GLB,, | Performed by: STUDENT IN AN ORGANIZED HEALTH CARE EDUCATION/TRAINING PROGRAM

## 2023-03-22 PROCEDURE — 99203 OFFICE O/P NEW LOW 30 MIN: CPT | Mod: S$GLB,,, | Performed by: STUDENT IN AN ORGANIZED HEALTH CARE EDUCATION/TRAINING PROGRAM

## 2023-03-22 PROCEDURE — 99999 PR PBB SHADOW E&M-EST. PATIENT-LVL IV: ICD-10-PCS | Mod: PBBFAC,,, | Performed by: STUDENT IN AN ORGANIZED HEALTH CARE EDUCATION/TRAINING PROGRAM

## 2023-03-22 PROCEDURE — 1159F MED LIST DOCD IN RCRD: CPT | Mod: CPTII,S$GLB,, | Performed by: STUDENT IN AN ORGANIZED HEALTH CARE EDUCATION/TRAINING PROGRAM

## 2023-03-22 PROCEDURE — 99999 PR PBB SHADOW E&M-EST. PATIENT-LVL IV: CPT | Mod: PBBFAC,,, | Performed by: STUDENT IN AN ORGANIZED HEALTH CARE EDUCATION/TRAINING PROGRAM

## 2023-03-22 PROCEDURE — 1160F PR REVIEW ALL MEDS BY PRESCRIBER/CLIN PHARMACIST DOCUMENTED: ICD-10-PCS | Mod: CPTII,S$GLB,, | Performed by: STUDENT IN AN ORGANIZED HEALTH CARE EDUCATION/TRAINING PROGRAM

## 2023-03-22 PROCEDURE — 3288F PR FALLS RISK ASSESSMENT DOCUMENTED: ICD-10-PCS | Mod: CPTII,S$GLB,, | Performed by: STUDENT IN AN ORGANIZED HEALTH CARE EDUCATION/TRAINING PROGRAM

## 2023-03-22 PROCEDURE — 1125F AMNT PAIN NOTED PAIN PRSNT: CPT | Mod: CPTII,S$GLB,, | Performed by: STUDENT IN AN ORGANIZED HEALTH CARE EDUCATION/TRAINING PROGRAM

## 2023-03-22 PROCEDURE — 3288F FALL RISK ASSESSMENT DOCD: CPT | Mod: CPTII,S$GLB,, | Performed by: STUDENT IN AN ORGANIZED HEALTH CARE EDUCATION/TRAINING PROGRAM

## 2023-03-22 RX ORDER — HYDROCODONE BITARTRATE AND ACETAMINOPHEN 5; 325 MG/1; MG/1
TABLET ORAL
COMMUNITY
Start: 2023-01-24 | End: 2023-12-06

## 2023-03-22 RX ORDER — DICLOFENAC SODIUM 10 MG/G
GEL TOPICAL
COMMUNITY
Start: 2023-03-06

## 2023-03-22 RX ORDER — AMOXICILLIN 500 MG/1
CAPSULE ORAL
COMMUNITY
Start: 2023-01-24 | End: 2023-12-06 | Stop reason: ALTCHOICE

## 2023-03-22 NOTE — PROGRESS NOTES
Chief Complaint   Patient presents with    Diabetic Foot Exam     Endo 10/17/22              MEDICAL DECISION MAKING:        ICD-10-CM ICD-9-CM    1. Type 2 diabetes mellitus with diabetic cataract, with long-term current use of insulin  E11.36 250.50     Z79.4 366.41      V58.67       2. Pain and swelling of right ankle  M25.571 719.47     M25.471 719.07               Patient was evaluated and risk assessed today(3/22/23). The patient is at low risk for developing pedal complications. I explained to the patient that developing uncontrolled DMII, PAD, DMN can make healing injuries difficult leading to wounds or gangrene.  In general, I recommend monitoring the feet daily for any areas of pressure or injuries. If any areas appear to be healing slowly or become infected, seek medical attention as soon as possible. Wear supportive shoes and avoid barefoot walking.   Shoe inspection.     Continue good nutrition and blood sugar control to help prevent podiatric complications of diabetes.   Maintain proper foot hygiene.   Continue wearing proper shoe gear, daily foot inspections, never walking without protective shoe gear, never putting sharp instruments to feet.  Patient does not qualify for nail care according to my clinical evaluation.  Follow up in 1 year    Right ankle:  I recommend the patient offload the ankle with a boot and obtain x-rays for the ankle due to the continued swelling and pain for the last 2 months. Patient refuses any further treatment for the R ankle.     HPI:   The patient is a 77 y.o.  female  who presents for a diabetic foot exam.     Patient reports no presence of abnormal sensation to the feet .    History of diabetic foot ulcers: no   History of foot surgery: no.     Shoes worn today:  clogs  no burning, numbness and tingling in the feet.  no cramping or deep aching when walking.    The patient is under the Active Care of Beto Parsons II, MD for the qualifying diagnosis of diabetes  "mellitus.  Patient was last seen on Diabetic Foot Exam (Endo 10/17/22)  .          Patient Active Problem List   Diagnosis    Essential hypertension    Insomnia    Anxiety    Type 2 diabetes mellitus with diabetic cataract, with long-term current use of insulin    Obesity (BMI 30.0-34.9)    Type 2 diabetes mellitus with both eyes affected by mild nonproliferative retinopathy without macular edema, with long-term current use of insulin    Vitamin D insufficiency    Gastroesophageal reflux disease    Controlled type 2 diabetes mellitus with diabetic polyneuropathy, with long-term current use of insulin    Chronic hepatitis B           Current Outpatient Medications on File Prior to Visit   Medication Sig Dispense Refill    ACCU-CHEK GUIDE GLUCOSE METER Misc TO CHECK BG 2 TIMES DAILY, TO USE WITH INSURANCE PREFERRED METER. DX CODE E11.42      amitriptyline (ELAVIL) 25 MG tablet Take 25 mg by mouth every evening.      amoxicillin (AMOXIL) 500 MG capsule TAKE 1 CAPSULE BY MOUTH 3 TIMES DAILY WITH FOOD UNTIL COMPLETE      BD ULTRA-FINE YULI PEN NEEDLE 32 gauge x 5/32" Ndle USE WITH LEVEMIR DAILY 100 each 6    bisacodyl (DULCOLAX) 5 mg EC tablet Take 5 mg by mouth daily as needed.       blood glucose control, low (TRUE METRIX LEVEL 1) Soln Use as directed.  Dx code E11.36 1 each 1    blood sugar diagnostic Strp To check BG 2 times daily, to use with insurance preferred meter. Dx code E11.42 200 each 3    blood-glucose meter kit To check BG 2 times daily, to use with insurance preferred meter. Dx code E11.42 1 each 0    cholecalciferol, vitamin D3, (VITAMIN D3) 25 mcg (1,000 unit) capsule TAKE 1 CAPSULE (1,000 UNITS TOTAL) BY MOUTH ONCE DAILY. 90 capsule 3    cyclobenzaprine (FEXMID) 7.5 MG Tab TAKE 1 TABLET BY MOUTH TWICE A DAY AS NEEDED FOR SPASMS  5    diclofenac sodium (VOLTAREN) 1 % Gel       furosemide (LASIX) 20 MG tablet Take 1 tablet (20 mg total) by mouth once daily. 90 tablet 3    gabapentin (NEURONTIN) 100 MG " "capsule Take 1 capsule by mouth 2 (two) times daily.  1    glimepiride (AMARYL) 1 MG tablet Take 1 tablet (1 mg total) by mouth before breakfast. 90 tablet 3    HYDROcodone-acetaminophen (NORCO) 5-325 mg per tablet TAKE 1 TABLET BY MOUTH WITH FOOD EVERY 6 HOURS AS NEEDED FOR PAIN      lancets Misc To check BG 2 times daily, to use with insurance preferred meter. Dx code E11.42 200 each 3    lorazepam (ATIVAN) 1 MG tablet Take 1 mg by mouth daily as needed for Anxiety.      losartan (COZAAR) 50 MG tablet Take 50 mg by mouth once daily.      metFORMIN (GLUCOPHAGE-XR) 500 MG ER 24hr tablet TAKE 2 TABLETS WITH BREAKFAST AND 2 TABLETS WITH DINNER 360 tablet 3    metoprolol succinate (TOPROL-XL) 50 MG 24 hr tablet TAKE 1 TABLET EVERY DAY 90 tablet 3    MOBIC 7.5 mg tablet Take 7.5 mg by mouth once daily.      pantoprazole (PROTONIX) 40 MG tablet Take 1 tablet (40 mg total) by mouth once daily. 90 tablet 3    pen needle, diabetic (BD ULTRA-FINE YULI PEN NEEDLES) 32 gauge x 5/32" Ndle USE WITH LEVEMIR DAILY 100 each 6    pioglitazone (ACTOS) 15 MG tablet Take 1 tablet (15 mg total) by mouth once daily. 90 tablet 3    simvastatin (ZOCOR) 10 MG tablet Take 10 mg by mouth every evening.      sumatriptan (IMITREX) 100 MG tablet TAKE 1 TABLET EVERY 2 HOURS AS NEEDED FOR MIGRAINE. MAXIMUM OF 2 TABLETS DAILY.  27 tablet 1    tizanidine (ZANAFLEX) 4 MG tablet Take 1-2 tablets by mouth nightly as needed.   1    traMADol (ULTRAM) 50 mg tablet Take 50 mg by mouth 2 (two) times daily as needed.  1    TRUE METRIX GLUCOSE METER kit USE AS DIRECTED 1 each 0    TRUE METRIX GLUCOSE TEST STRIP Strp TEST BLOOD SUGAR TWICE DAILY 200 strip 3    TRUEPLUS LANCETS 30 gauge Misc TEST BLOOD SUGAR TWICE DAILY 200 each 3     No current facility-administered medications on file prior to visit.           Review of patient's allergies indicates:   Allergen Reactions    Codeine     Hydrocodone      Hallucination               ROS:  General ROS: " "negative  Respiratory ROS: no cough, shortness of breath, or wheezing  Cardiovascular ROS: no chest pain or dyspnea on exertion  Musculoskeletal ROS: negative  Neurological ROS:   negative for - numbness/tingling  Dermatological ROS: negative      LAST HbA1c:   Lab Results   Component Value Date    HGBA1C 6.7 (H) 03/16/2023           EXAM:   Vitals:    03/22/23 0906   Weight: 82.6 kg (182 lb)   Height: 5' 2" (1.575 m)       General: healthy    Vascular:   Dorsalis Pedis:  present     Posterior Tibial:  present  Capillary refill time:  2 seconds  Temperature of toes warm to touch  Edema: Focal edema to the right ankle      Neurological:     Sharp touch:  normal  Light touch: normal  Tinels Sign:  Absent  [place]:  Absent deficits to sharp/dull, light touch or vibratory sensation feet, ten points tested.    Absent paresthesias (Abnormal spontaneous sensations in feet)        Dermatological:   Skin: appropriate for age  Hair growth:  decreased  Wounds/Ulcers:  Absent  Bruising:  Absent  Erythema:  Absent  Toenails:   thickness:  thickened;   Brownish in color,  with subungual debris.   Absent paronychia  Hyperkeratotic lesions to the n/a      Musculoskeletal:   + tenderness to palpation to the right medial, lateral, anterior ankle joint. Pain and guarding with provocation of the PT tendon and peroneal tendons  ROM is full without pain or crepitation  Bunions:  Absent  Hammertoes: Present    "

## 2023-05-22 ENCOUNTER — OFFICE VISIT (OUTPATIENT)
Dept: ENDOCRINOLOGY | Facility: CLINIC | Age: 78
End: 2023-05-22
Payer: MEDICARE

## 2023-05-22 VITALS
DIASTOLIC BLOOD PRESSURE: 70 MMHG | BODY MASS INDEX: 32.22 KG/M2 | WEIGHT: 175.06 LBS | HEIGHT: 62 IN | SYSTOLIC BLOOD PRESSURE: 130 MMHG | HEART RATE: 100 BPM

## 2023-05-22 DIAGNOSIS — E11.40 TYPE 2 DIABETES MELLITUS WITH DIABETIC NEUROPATHY, WITHOUT LONG-TERM CURRENT USE OF INSULIN: ICD-10-CM

## 2023-05-22 DIAGNOSIS — E11.22 TYPE 2 DIABETES MELLITUS WITH STAGE 3A CHRONIC KIDNEY DISEASE, WITHOUT LONG-TERM CURRENT USE OF INSULIN: ICD-10-CM

## 2023-05-22 DIAGNOSIS — E11.3293 TYPE 2 DIABETES MELLITUS WITH BOTH EYES AFFECTED BY MILD NONPROLIFERATIVE RETINOPATHY WITHOUT MACULAR EDEMA, WITHOUT LONG-TERM CURRENT USE OF INSULIN: Primary | ICD-10-CM

## 2023-05-22 DIAGNOSIS — N18.31 TYPE 2 DIABETES MELLITUS WITH STAGE 3A CHRONIC KIDNEY DISEASE, WITHOUT LONG-TERM CURRENT USE OF INSULIN: ICD-10-CM

## 2023-05-22 DIAGNOSIS — I10 ESSENTIAL HYPERTENSION: ICD-10-CM

## 2023-05-22 PROCEDURE — 1126F AMNT PAIN NOTED NONE PRSNT: CPT | Mod: CPTII,S$GLB,, | Performed by: NURSE PRACTITIONER

## 2023-05-22 PROCEDURE — 3078F PR MOST RECENT DIASTOLIC BLOOD PRESSURE < 80 MM HG: ICD-10-PCS | Mod: CPTII,S$GLB,, | Performed by: NURSE PRACTITIONER

## 2023-05-22 PROCEDURE — 1101F PT FALLS ASSESS-DOCD LE1/YR: CPT | Mod: CPTII,S$GLB,, | Performed by: NURSE PRACTITIONER

## 2023-05-22 PROCEDURE — 3075F PR MOST RECENT SYSTOLIC BLOOD PRESS GE 130-139MM HG: ICD-10-PCS | Mod: CPTII,S$GLB,, | Performed by: NURSE PRACTITIONER

## 2023-05-22 PROCEDURE — 3075F SYST BP GE 130 - 139MM HG: CPT | Mod: CPTII,S$GLB,, | Performed by: NURSE PRACTITIONER

## 2023-05-22 PROCEDURE — 99214 PR OFFICE/OUTPT VISIT, EST, LEVL IV, 30-39 MIN: ICD-10-PCS | Mod: S$GLB,,, | Performed by: NURSE PRACTITIONER

## 2023-05-22 PROCEDURE — 3288F FALL RISK ASSESSMENT DOCD: CPT | Mod: CPTII,S$GLB,, | Performed by: NURSE PRACTITIONER

## 2023-05-22 PROCEDURE — 99214 OFFICE O/P EST MOD 30 MIN: CPT | Mod: S$GLB,,, | Performed by: NURSE PRACTITIONER

## 2023-05-22 PROCEDURE — 3078F DIAST BP <80 MM HG: CPT | Mod: CPTII,S$GLB,, | Performed by: NURSE PRACTITIONER

## 2023-05-22 PROCEDURE — 3288F PR FALLS RISK ASSESSMENT DOCUMENTED: ICD-10-PCS | Mod: CPTII,S$GLB,, | Performed by: NURSE PRACTITIONER

## 2023-05-22 PROCEDURE — 1160F PR REVIEW ALL MEDS BY PRESCRIBER/CLIN PHARMACIST DOCUMENTED: ICD-10-PCS | Mod: CPTII,S$GLB,, | Performed by: NURSE PRACTITIONER

## 2023-05-22 PROCEDURE — 1160F RVW MEDS BY RX/DR IN RCRD: CPT | Mod: CPTII,S$GLB,, | Performed by: NURSE PRACTITIONER

## 2023-05-22 PROCEDURE — 1159F PR MEDICATION LIST DOCUMENTED IN MEDICAL RECORD: ICD-10-PCS | Mod: CPTII,S$GLB,, | Performed by: NURSE PRACTITIONER

## 2023-05-22 PROCEDURE — 99999 PR PBB SHADOW E&M-EST. PATIENT-LVL V: ICD-10-PCS | Mod: PBBFAC,,, | Performed by: NURSE PRACTITIONER

## 2023-05-22 PROCEDURE — 1101F PR PT FALLS ASSESS DOC 0-1 FALLS W/OUT INJ PAST YR: ICD-10-PCS | Mod: CPTII,S$GLB,, | Performed by: NURSE PRACTITIONER

## 2023-05-22 PROCEDURE — 1159F MED LIST DOCD IN RCRD: CPT | Mod: CPTII,S$GLB,, | Performed by: NURSE PRACTITIONER

## 2023-05-22 PROCEDURE — 99999 PR PBB SHADOW E&M-EST. PATIENT-LVL V: CPT | Mod: PBBFAC,,, | Performed by: NURSE PRACTITIONER

## 2023-05-22 PROCEDURE — 99215 OFFICE O/P EST HI 40 MIN: CPT | Mod: PO | Performed by: NURSE PRACTITIONER

## 2023-05-22 PROCEDURE — 1126F PR PAIN SEVERITY QUANTIFIED, NO PAIN PRESENT: ICD-10-PCS | Mod: CPTII,S$GLB,, | Performed by: NURSE PRACTITIONER

## 2023-05-22 NOTE — PROGRESS NOTES
CC: Ms. Swetha Thompson arrives today for management of Type 2 DM and review of chronic medical conditions, as listed in the visit diagnosis section of this encounter.     HPI: Ms. Swetha Thompson was diagnosed with Type 2 DM in her 50s. Initial treatment consisted of oral medications and insulin was added in ~. Actos was added by PCP in 2018. Insulin was discontinued in 2018, following hypoglycemia and significant decrease in insulin needs. However, her readings increased >150 mg/dL so she resumed low dose Tresiba. + FH of DM in mother, brother, sister, maternal GM. Past ER visit on 18 via EMS, due to hypoglycemia in the 30s, which caused loss of consciousness at home. Insulin doses were adjusted at this time. Tresiba later discontinued in , due to hypoglycemia on low dose of 10 units daily.      Patient was last seen by me in October.    BG readings are checked 2x/day, fasting only. Reports the following:  Fastin  Dinner: 100    Hypoglycemia: No    Missing Insulin/PO medication doses: No    Exercise: walks with friend every evening.     Dietary Habits: Eats 1 meal per daily, dinner only. Rare snacking. Avoids sugary beverages.     Last DM education appointment: 2017        CURRENT DIABETIC MEDS: Metformin XR 1000 mg BID, glimepiride 1 mg daily, pioglitazone 15 mg daily  Vial or pen: pen  Glucometer type: unsure    Previous DM meds:  Levemir  Novolog   Tresiba - hypoglycemia on low dose of 10 units  Glipizide XR    Last Eye Exam: , + DR. Dr. Shields.   Last Podiatry Exam: 3/2023, doesn't plan to return    REVIEW OF SYSTEMS  Constitutional: no c/o weakness, fatigue. + 7# weight loss  Cardiac: no chest pain, palpitations.  Respiratory: no dyspnea, cough.  GI: no c/o abdominal pain, nausea. Denies h/o pancreatitis.   Skin: no lesions or rashes.  Neuro: no numbness, tingling, paresthesias.   Endocrine: denies polyphagia, polydipsia, polyuria.     Personally reviewed Past Medical,  "Surgical, Social History.    Vital Signs  /70   Pulse 100   Ht 5' 2" (1.575 m)   Wt 79.4 kg (175 lb 0.7 oz)   BMI 32.02 kg/m²      Personally reviewed the below labs:    Hemoglobin A1C   Date Value Ref Range Status   03/16/2023 6.7 (H) 4.0 - 5.6 % Final     Comment:     ADA Screening Guidelines:  5.7-6.4%  Consistent with prediabetes  >or=6.5%  Consistent with diabetes    High levels of fetal hemoglobin interfere with the HbA1C  assay. Heterozygous hemoglobin variants (HbS, HgC, etc)do  not significantly interfere with this assay.   However, presence of multiple variants may affect accuracy.     10/10/2022 6.8 (H) 4.0 - 5.6 % Final     Comment:     ADA Screening Guidelines:  5.7-6.4%  Consistent with prediabetes  >or=6.5%  Consistent with diabetes    High levels of fetal hemoglobin interfere with the HbA1C  assay. Heterozygous hemoglobin variants (HbS, HgC, etc)do  not significantly interfere with this assay.   However, presence of multiple variants may affect accuracy.     07/08/2022 7.7 (H) 4.0 - 5.6 % Final     Comment:     ADA Screening Guidelines:  5.7-6.4%  Consistent with prediabetes  >or=6.5%  Consistent with diabetes    High levels of fetal hemoglobin interfere with the HbA1C  assay. Heterozygous hemoglobin variants (HbS, HgC, etc)do  not significantly interfere with this assay.   However, presence of multiple variants may affect accuracy.         Chemistry        Component Value Date/Time     03/16/2023 0736    K 3.8 03/16/2023 0736     03/16/2023 0736    CO2 24 03/16/2023 0736    BUN 11 03/16/2023 0736    CREATININE 1.3 03/16/2023 0736     (H) 03/16/2023 0736        Component Value Date/Time    CALCIUM 8.8 03/16/2023 0736    ALKPHOS 47 (L) 10/10/2022 0831    AST 14 10/10/2022 0831    ALT 10 10/10/2022 0831    BILITOT 0.4 10/10/2022 0831    ESTGFRAFRICA 45.4 (A) 07/08/2022 0750    EGFRNONAA 39.4 (A) 07/08/2022 0750          Lab Results   Component Value Date    CHOL 188 " 07/08/2022    CHOL 188 05/12/2021    CHOL 189 05/15/2020     Lab Results   Component Value Date    HDL 62 07/08/2022    HDL 64 05/12/2021    HDL 62 05/15/2020     Lab Results   Component Value Date    LDLCALC 106.4 07/08/2022    LDLCALC 110.6 05/12/2021    LDLCALC 116.2 05/15/2020     Lab Results   Component Value Date    TRIG 98 07/08/2022    TRIG 67 05/12/2021    TRIG 54 05/15/2020     Lab Results   Component Value Date    CHOLHDL 33.0 07/08/2022    CHOLHDL 34.0 05/12/2021    CHOLHDL 32.8 05/15/2020       Lab Results   Component Value Date    MICALBCREAT 8.7 09/13/2021     Lab Results   Component Value Date    TSH 4.978 (H) 03/16/2023     CrCl cannot be calculated (Patient's most recent lab result is older than the maximum 7 days allowed.).    Vit D, 25-Hydroxy   Date Value Ref Range Status   09/13/2021 60 30 - 96 ng/mL Final     Comment:     Vitamin D deficiency.........<10 ng/mL                              Vitamin D insufficiency......10-29 ng/mL       Vitamin D sufficiency........> or equal to 30 ng/mL  Vitamin D toxicity............>100 ng/mL          PHYSICAL EXAMINATION  Constitutional: Appears well, no distress.    Respiratory: CTA, even and unlabored.  Cardiovascular: RRR, no murmurs.  GI: active bowel sounds, no hernia  Skin: warm and dry  Neuro: oriented to person, place, time  Feet: appropriate footwear.        Goals        HEMOGLOBIN A1C < 7.5               Assessment/Plan  1. 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 Patient denies hypoglycemia. I did recommend that she try to eat 3x/day since she is taking glimepiride.   -- continue metformin, pioglitazone, glimepiride.  -- check BG 1x/day, alternating times.   -- will call to obtain eye exam report.   -- takes ARB, statin   2. Type 2 diabetes mellitus with diabetic neuropathy -- maintain DM control.    3. Type 2  diabetes mellitus with stage 3a chronic kidney disease, without long-term current use of insulin -- stable  -- maintain DM control   4. Hypertension -- controlled  -- continue losartan       FOLLOW UP  Follow up in about 4 months (around 9/22/2023).  Patient instructed to bring BG logs to each follow up   Patient encouraged to call for any BG/medication issues, concerns, or questions.    Orders Placed This Encounter   Procedures    Hemoglobin A1C    Comprehensive Metabolic Panel    TSH    Microalbumin/Creatinine Ratio, Urine    Lipid Panel

## 2023-11-14 ENCOUNTER — LAB VISIT (OUTPATIENT)
Dept: LAB | Facility: HOSPITAL | Age: 78
End: 2023-11-14
Attending: NURSE PRACTITIONER
Payer: MEDICARE

## 2023-11-14 ENCOUNTER — OFFICE VISIT (OUTPATIENT)
Dept: ENDOCRINOLOGY | Facility: CLINIC | Age: 78
End: 2023-11-14
Payer: MEDICARE

## 2023-11-14 ENCOUNTER — TELEPHONE (OUTPATIENT)
Dept: ENDOCRINOLOGY | Facility: CLINIC | Age: 78
End: 2023-11-14
Payer: MEDICARE

## 2023-11-14 VITALS
WEIGHT: 167.56 LBS | HEIGHT: 62 IN | DIASTOLIC BLOOD PRESSURE: 70 MMHG | HEART RATE: 79 BPM | SYSTOLIC BLOOD PRESSURE: 150 MMHG | BODY MASS INDEX: 30.83 KG/M2

## 2023-11-14 DIAGNOSIS — E11.3293 TYPE 2 DIABETES MELLITUS WITH BOTH EYES AFFECTED BY MILD NONPROLIFERATIVE RETINOPATHY WITHOUT MACULAR EDEMA, WITHOUT LONG-TERM CURRENT USE OF INSULIN: ICD-10-CM

## 2023-11-14 DIAGNOSIS — E11.3293 TYPE 2 DIABETES MELLITUS WITH BOTH EYES AFFECTED BY MILD NONPROLIFERATIVE RETINOPATHY WITHOUT MACULAR EDEMA, WITHOUT LONG-TERM CURRENT USE OF INSULIN: Primary | ICD-10-CM

## 2023-11-14 DIAGNOSIS — E11.40 TYPE 2 DIABETES MELLITUS WITH DIABETIC NEUROPATHY, WITHOUT LONG-TERM CURRENT USE OF INSULIN: ICD-10-CM

## 2023-11-14 DIAGNOSIS — I10 ESSENTIAL HYPERTENSION: ICD-10-CM

## 2023-11-14 DIAGNOSIS — N18.31 TYPE 2 DIABETES MELLITUS WITH STAGE 3A CHRONIC KIDNEY DISEASE, WITHOUT LONG-TERM CURRENT USE OF INSULIN: ICD-10-CM

## 2023-11-14 DIAGNOSIS — E11.22 TYPE 2 DIABETES MELLITUS WITH STAGE 3A CHRONIC KIDNEY DISEASE, WITHOUT LONG-TERM CURRENT USE OF INSULIN: ICD-10-CM

## 2023-11-14 DIAGNOSIS — E63.9 POOR NUTRITION: ICD-10-CM

## 2023-11-14 LAB
ALBUMIN SERPL BCP-MCNC: 3.4 G/DL (ref 3.5–5.2)
ALBUMIN/CREAT UR: 42.5 UG/MG (ref 0–30)
ALP SERPL-CCNC: 39 U/L (ref 55–135)
ALT SERPL W/O P-5'-P-CCNC: 8 U/L (ref 10–44)
ANION GAP SERPL CALC-SCNC: 9 MMOL/L (ref 8–16)
AST SERPL-CCNC: 15 U/L (ref 10–40)
BILIRUB SERPL-MCNC: 0.2 MG/DL (ref 0.1–1)
BUN SERPL-MCNC: 13 MG/DL (ref 8–23)
CALCIUM SERPL-MCNC: 9.1 MG/DL (ref 8.7–10.5)
CHLORIDE SERPL-SCNC: 107 MMOL/L (ref 95–110)
CHOLEST SERPL-MCNC: 219 MG/DL (ref 120–199)
CHOLEST/HDLC SERPL: 3.3 {RATIO} (ref 2–5)
CO2 SERPL-SCNC: 25 MMOL/L (ref 23–29)
CREAT SERPL-MCNC: 1.4 MG/DL (ref 0.5–1.4)
CREAT UR-MCNC: 167 MG/DL (ref 15–325)
EST. GFR  (NO RACE VARIABLE): 38.3 ML/MIN/1.73 M^2
ESTIMATED AVG GLUCOSE: 126 MG/DL (ref 68–131)
GLUCOSE SERPL-MCNC: 148 MG/DL (ref 70–110)
HBA1C MFR BLD: 6 % (ref 4–5.6)
HDLC SERPL-MCNC: 66 MG/DL (ref 40–75)
HDLC SERPL: 30.1 % (ref 20–50)
LDLC SERPL CALC-MCNC: 138.6 MG/DL (ref 63–159)
MICROALBUMIN UR DL<=1MG/L-MCNC: 71 UG/ML
NONHDLC SERPL-MCNC: 153 MG/DL
POTASSIUM SERPL-SCNC: 3.9 MMOL/L (ref 3.5–5.1)
PROT SERPL-MCNC: 6.7 G/DL (ref 6–8.4)
SODIUM SERPL-SCNC: 141 MMOL/L (ref 136–145)
TRIGL SERPL-MCNC: 72 MG/DL (ref 30–150)
TSH SERPL DL<=0.005 MIU/L-ACNC: 1.53 UIU/ML (ref 0.4–4)

## 2023-11-14 PROCEDURE — 82043 UR ALBUMIN QUANTITATIVE: CPT | Performed by: NURSE PRACTITIONER

## 2023-11-14 PROCEDURE — 1160F PR REVIEW ALL MEDS BY PRESCRIBER/CLIN PHARMACIST DOCUMENTED: ICD-10-PCS | Mod: CPTII,S$GLB,, | Performed by: NURSE PRACTITIONER

## 2023-11-14 PROCEDURE — 1159F PR MEDICATION LIST DOCUMENTED IN MEDICAL RECORD: ICD-10-PCS | Mod: CPTII,S$GLB,, | Performed by: NURSE PRACTITIONER

## 2023-11-14 PROCEDURE — 1101F PR PT FALLS ASSESS DOC 0-1 FALLS W/OUT INJ PAST YR: ICD-10-PCS | Mod: CPTII,S$GLB,, | Performed by: NURSE PRACTITIONER

## 2023-11-14 PROCEDURE — 36415 COLL VENOUS BLD VENIPUNCTURE: CPT | Mod: PN | Performed by: NURSE PRACTITIONER

## 2023-11-14 PROCEDURE — 80053 COMPREHEN METABOLIC PANEL: CPT | Performed by: NURSE PRACTITIONER

## 2023-11-14 PROCEDURE — 3077F PR MOST RECENT SYSTOLIC BLOOD PRESSURE >= 140 MM HG: ICD-10-PCS | Mod: CPTII,S$GLB,, | Performed by: NURSE PRACTITIONER

## 2023-11-14 PROCEDURE — 1160F RVW MEDS BY RX/DR IN RCRD: CPT | Mod: CPTII,S$GLB,, | Performed by: NURSE PRACTITIONER

## 2023-11-14 PROCEDURE — 1126F AMNT PAIN NOTED NONE PRSNT: CPT | Mod: CPTII,S$GLB,, | Performed by: NURSE PRACTITIONER

## 2023-11-14 PROCEDURE — 3288F PR FALLS RISK ASSESSMENT DOCUMENTED: ICD-10-PCS | Mod: CPTII,S$GLB,, | Performed by: NURSE PRACTITIONER

## 2023-11-14 PROCEDURE — 99999 PR PBB SHADOW E&M-EST. PATIENT-LVL III: ICD-10-PCS | Mod: PBBFAC,,, | Performed by: NURSE PRACTITIONER

## 2023-11-14 PROCEDURE — 1101F PT FALLS ASSESS-DOCD LE1/YR: CPT | Mod: CPTII,S$GLB,, | Performed by: NURSE PRACTITIONER

## 2023-11-14 PROCEDURE — 99999 PR PBB SHADOW E&M-EST. PATIENT-LVL III: CPT | Mod: PBBFAC,,, | Performed by: NURSE PRACTITIONER

## 2023-11-14 PROCEDURE — 1126F PR PAIN SEVERITY QUANTIFIED, NO PAIN PRESENT: ICD-10-PCS | Mod: CPTII,S$GLB,, | Performed by: NURSE PRACTITIONER

## 2023-11-14 PROCEDURE — 80061 LIPID PANEL: CPT | Performed by: NURSE PRACTITIONER

## 2023-11-14 PROCEDURE — 84443 ASSAY THYROID STIM HORMONE: CPT | Performed by: NURSE PRACTITIONER

## 2023-11-14 PROCEDURE — 3078F DIAST BP <80 MM HG: CPT | Mod: CPTII,S$GLB,, | Performed by: NURSE PRACTITIONER

## 2023-11-14 PROCEDURE — 83036 HEMOGLOBIN GLYCOSYLATED A1C: CPT | Performed by: NURSE PRACTITIONER

## 2023-11-14 PROCEDURE — 1159F MED LIST DOCD IN RCRD: CPT | Mod: CPTII,S$GLB,, | Performed by: NURSE PRACTITIONER

## 2023-11-14 PROCEDURE — 99214 OFFICE O/P EST MOD 30 MIN: CPT | Mod: S$GLB,,, | Performed by: NURSE PRACTITIONER

## 2023-11-14 PROCEDURE — 3078F PR MOST RECENT DIASTOLIC BLOOD PRESSURE < 80 MM HG: ICD-10-PCS | Mod: CPTII,S$GLB,, | Performed by: NURSE PRACTITIONER

## 2023-11-14 PROCEDURE — 99214 PR OFFICE/OUTPT VISIT, EST, LEVL IV, 30-39 MIN: ICD-10-PCS | Mod: S$GLB,,, | Performed by: NURSE PRACTITIONER

## 2023-11-14 PROCEDURE — 3288F FALL RISK ASSESSMENT DOCD: CPT | Mod: CPTII,S$GLB,, | Performed by: NURSE PRACTITIONER

## 2023-11-14 PROCEDURE — 3077F SYST BP >= 140 MM HG: CPT | Mod: CPTII,S$GLB,, | Performed by: NURSE PRACTITIONER

## 2023-11-14 NOTE — TELEPHONE ENCOUNTER
Left detailed message on pt's vm to call back to schedule appt. Advised dr. Parsons in [place] full time now.

## 2023-11-14 NOTE — TELEPHONE ENCOUNTER
Good morning    Carmelita would like this pt to schedule a f/u w/ her PCP Dr Parsons. I was not able to do so.Please schedule pt  Thanks

## 2023-11-14 NOTE — PATIENT INSTRUCTIONS
Stop pioglitazone, due to swelling.    Stop glimepiride, due to lower blood sugars.    Continue metformin.    Notify me if blood sugars are often > 150.    Schedule appointment with Dr. Parsons to discuss lack of appetite/possible depression.

## 2023-11-14 NOTE — PROGRESS NOTES
"CC: Ms. Swetha Thompson arrives today for management of Type 2 DM and review of chronic medical conditions, as listed in the visit diagnosis section of this encounter.     HPI: Ms. Swetha Thompson was diagnosed with Type 2 DM in her 50s. Initial treatment consisted of oral medications and insulin was added in ~. Actos was added by PCP in 2018. Insulin was discontinued in 2018, following hypoglycemia and significant decrease in insulin needs. However, her readings increased >150 mg/dL so she resumed low dose Tresiba. + FH of DM in mother, brother, sister, maternal GM. Past ER visit on 18 via EMS, due to hypoglycemia in the 30s, which caused loss of consciousness at home. Insulin doses were adjusted at this time. Tresiba later discontinued in , due to hypoglycemia on low dose of 10 units daily.      Patient was last seen by me in May.     She has noticed more swelling in her feet over the last few weeks. Worse at the end of the day.    Reports poor appetite since since death of her brother. States "I never eat food, I only drink coffee." States she doesn't feel depressed but thinks often of death and how those around her have passed away. Denies SI, HI. She states that she is frustrated when family asks about her eating habits.     BG readings are checked 2x/day, fasting only. Reports the following:  Fastin-80    Hypoglycemia: Occasional     Missing Insulin/PO medication doses: No     Dietary Habits: States that she doesn't eat. States that she is not hungry. Avoids sugary beverages.     Last DM education appointment: 2017        CURRENT DIABETIC MEDS: Metformin XR 1000 mg BID, glimepiride 1 mg daily, pioglitazone 15 mg daily  Vial or pen: pen  Glucometer type: unsure    Previous DM meds:  Levemir  Novolog   Tresiba - hypoglycemia on low dose of 10 units  Glipizide XR    Last Eye Exam: , + DR. Dr. Shields.   Last Podiatry Exam: 3/2023, doesn't plan to return    REVIEW OF " "SYSTEMS  Constitutional: no fatigue. c/o weakness, + 8# weight loss  Cardiac: no chest pain, palpitations. + swelling in feet, legs  Respiratory: no dyspnea, cough.  GI: no c/o abdominal pain, nausea. Denies h/o pancreatitis.   Skin: no lesions or rashes.  Psych: denies depression, anxiety but feels that she has been mentally occupied by family members' deaths  Neuro: no numbness, tingling, paresthesias.   Endocrine: denies polyphagia, polydipsia, polyuria.     Personally reviewed Past Medical, Surgical, Social History.    Vital Signs  BP (!) 150/70   Pulse 79   Ht 5' 2" (1.575 m)   Wt 76 kg (167 lb 8.8 oz)   BMI 30.65 kg/m²      Personally reviewed the below labs:    Hemoglobin A1C   Date Value Ref Range Status   03/16/2023 6.7 (H) 4.0 - 5.6 % Final     Comment:     ADA Screening Guidelines:  5.7-6.4%  Consistent with prediabetes  >or=6.5%  Consistent with diabetes    High levels of fetal hemoglobin interfere with the HbA1C  assay. Heterozygous hemoglobin variants (HbS, HgC, etc)do  not significantly interfere with this assay.   However, presence of multiple variants may affect accuracy.     10/10/2022 6.8 (H) 4.0 - 5.6 % Final     Comment:     ADA Screening Guidelines:  5.7-6.4%  Consistent with prediabetes  >or=6.5%  Consistent with diabetes    High levels of fetal hemoglobin interfere with the HbA1C  assay. Heterozygous hemoglobin variants (HbS, HgC, etc)do  not significantly interfere with this assay.   However, presence of multiple variants may affect accuracy.     07/08/2022 7.7 (H) 4.0 - 5.6 % Final     Comment:     ADA Screening Guidelines:  5.7-6.4%  Consistent with prediabetes  >or=6.5%  Consistent with diabetes    High levels of fetal hemoglobin interfere with the HbA1C  assay. Heterozygous hemoglobin variants (HbS, HgC, etc)do  not significantly interfere with this assay.   However, presence of multiple variants may affect accuracy.         Chemistry        Component Value Date/Time     " 03/16/2023 0736    K 3.8 03/16/2023 0736     03/16/2023 0736    CO2 24 03/16/2023 0736    BUN 11 03/16/2023 0736    CREATININE 1.3 03/16/2023 0736     (H) 03/16/2023 0736        Component Value Date/Time    CALCIUM 8.8 03/16/2023 0736    ALKPHOS 47 (L) 10/10/2022 0831    AST 14 10/10/2022 0831    ALT 10 10/10/2022 0831    BILITOT 0.4 10/10/2022 0831    ESTGFRAFRICA 45.4 (A) 07/08/2022 0750    EGFRNONAA 39.4 (A) 07/08/2022 0750          Lab Results   Component Value Date    CHOL 188 07/08/2022    CHOL 188 05/12/2021    CHOL 189 05/15/2020     Lab Results   Component Value Date    HDL 62 07/08/2022    HDL 64 05/12/2021    HDL 62 05/15/2020     Lab Results   Component Value Date    LDLCALC 106.4 07/08/2022    LDLCALC 110.6 05/12/2021    LDLCALC 116.2 05/15/2020     Lab Results   Component Value Date    TRIG 98 07/08/2022    TRIG 67 05/12/2021    TRIG 54 05/15/2020     Lab Results   Component Value Date    CHOLHDL 33.0 07/08/2022    CHOLHDL 34.0 05/12/2021    CHOLHDL 32.8 05/15/2020       Lab Results   Component Value Date    MICALBCREAT 8.7 09/13/2021     Lab Results   Component Value Date    TSH 4.978 (H) 03/16/2023     CrCl cannot be calculated (Patient's most recent lab result is older than the maximum 7 days allowed.).    Vit D, 25-Hydroxy   Date Value Ref Range Status   09/13/2021 60 30 - 96 ng/mL Final     Comment:     Vitamin D deficiency.........<10 ng/mL                              Vitamin D insufficiency......10-29 ng/mL       Vitamin D sufficiency........> or equal to 30 ng/mL  Vitamin D toxicity............>100 ng/mL          PHYSICAL EXAMINATION  Constitutional: Appears well, no distress.  Respiratory: CTA, even and unlabored.  Cardiovascular: RRR, no murmurs.  GI: active bowel sounds, no hernia  Skin: warm and dry  Neuro: oriented to person, place, time  Feet: appropriate footwear.        Goals        HEMOGLOBIN A1C < 7.5               Assessment/Plan  1. Type 2 diabetes mellitus with both  eyes affected by mild nonproliferative diabetic retinopathy without macular edema, without long-term current use of insulin  -- A1c, CMP, TSH, urine mcr today. Patient has poor eating habits, likely due to depression or possibly cognitive decline. Glimepiride is causing hypoglycemia. I have concern for malnutrition.  She declines Psych/counseling referral. However, she is open to discussing this further with PCP. Denies SI/HI.  -- stop pioglitazone, due to BLE swelling  -- stop glimepiride, due to poor PO intake/hypoglycemia.   -- continue metformin.  -- check BG 1x/day, alternating times.   -- takes ARB, statin   2. Type 2 diabetes mellitus with diabetic neuropathy -- maintain DM control.    3. Type 2 diabetes mellitus with stage 3a chronic kidney disease, without long-term current use of insulin -- stable  -- maintain DM control   4. Hypertension -- uncontrolled, patient reports compliance with losartan.   -- previously controlled, continue to monitor   5. Poor nutrition  --  see A/P #1       FOLLOW UP  Follow up in about 3 months (around 2/14/2024).  Patient instructed to bring BG logs to each follow up   Patient encouraged to call for any BG/medication issues, concerns, or questions.    Orders Placed This Encounter   Procedures    Hemoglobin A1C    Comprehensive Metabolic Panel

## 2023-12-06 PROBLEM — I47.19 PAROXYSMAL ATRIAL TACHYCARDIA: Status: ACTIVE | Noted: 2023-12-06

## 2023-12-06 PROBLEM — I47.10 SVT (SUPRAVENTRICULAR TACHYCARDIA): Status: ACTIVE | Noted: 2023-12-06

## 2024-04-25 ENCOUNTER — OFFICE VISIT (OUTPATIENT)
Dept: ENDOCRINOLOGY | Facility: CLINIC | Age: 79
End: 2024-04-25
Payer: MEDICARE

## 2024-04-25 VITALS
HEIGHT: 62 IN | WEIGHT: 148.13 LBS | DIASTOLIC BLOOD PRESSURE: 64 MMHG | HEART RATE: 69 BPM | BODY MASS INDEX: 27.26 KG/M2 | SYSTOLIC BLOOD PRESSURE: 112 MMHG | OXYGEN SATURATION: 98 %

## 2024-04-25 DIAGNOSIS — N18.32 TYPE 2 DIABETES MELLITUS WITH STAGE 3B CHRONIC KIDNEY DISEASE, WITHOUT LONG-TERM CURRENT USE OF INSULIN: Primary | ICD-10-CM

## 2024-04-25 DIAGNOSIS — I10 ESSENTIAL HYPERTENSION: ICD-10-CM

## 2024-04-25 DIAGNOSIS — E11.22 TYPE 2 DIABETES MELLITUS WITH STAGE 3B CHRONIC KIDNEY DISEASE, WITHOUT LONG-TERM CURRENT USE OF INSULIN: Primary | ICD-10-CM

## 2024-04-25 PROCEDURE — 3078F DIAST BP <80 MM HG: CPT | Mod: CPTII,S$GLB,, | Performed by: NURSE PRACTITIONER

## 2024-04-25 PROCEDURE — 1125F AMNT PAIN NOTED PAIN PRSNT: CPT | Mod: CPTII,S$GLB,, | Performed by: NURSE PRACTITIONER

## 2024-04-25 PROCEDURE — 99999 PR PBB SHADOW E&M-EST. PATIENT-LVL IV: CPT | Mod: PBBFAC,,, | Performed by: NURSE PRACTITIONER

## 2024-04-25 PROCEDURE — 1160F RVW MEDS BY RX/DR IN RCRD: CPT | Mod: CPTII,S$GLB,, | Performed by: NURSE PRACTITIONER

## 2024-04-25 PROCEDURE — 3074F SYST BP LT 130 MM HG: CPT | Mod: CPTII,S$GLB,, | Performed by: NURSE PRACTITIONER

## 2024-04-25 PROCEDURE — 99214 OFFICE O/P EST MOD 30 MIN: CPT | Mod: S$GLB,,, | Performed by: NURSE PRACTITIONER

## 2024-04-25 PROCEDURE — 3288F FALL RISK ASSESSMENT DOCD: CPT | Mod: CPTII,S$GLB,, | Performed by: NURSE PRACTITIONER

## 2024-04-25 PROCEDURE — 1101F PT FALLS ASSESS-DOCD LE1/YR: CPT | Mod: CPTII,S$GLB,, | Performed by: NURSE PRACTITIONER

## 2024-04-25 PROCEDURE — 1159F MED LIST DOCD IN RCRD: CPT | Mod: CPTII,S$GLB,, | Performed by: NURSE PRACTITIONER

## 2024-04-25 NOTE — PROGRESS NOTES
"Subjective     Patient ID: Swetha Thompson is a 78 y.o. female.    Chief Complaint: Diabetes    HPIwas diagnosed with Type 2 DM in her 50s. Initial treatment consisted of oral medications and insulin was added in ~2011. Actos was added by PCP in 2018. Insulin was discontinued in 2018, following hypoglycemia and significant decrease in insulin needs. However, her readings increased >150 mg/dL so she resumed low dose Tresiba. + FH of DM in mother, brother, sister, maternal GM. Past ER visit on 6/26/18 via EMS, due to hypoglycemia in the 30s, which caused loss of consciousness at home. Insulin doses were adjusted at this time. Tresiba later discontinued in 2019, due to hypoglycemia on low dose of 10 units daily.     Interim Events:--Pt new to me. Usually sees YAAKOV Gómez NP and last seen in November. Pt desired to change care to [facility].  Pt upset because someone came to her house, saying they were a nurse from kidney doctor and indicated she is in renal failure from DM.  The labs I reviewed indicated some decrease function, but likely age related.  She is alone.  She is only taking metformin.  She is not taking Toprol, simvastatin or Losartan--because she states her doctor has not refilled--but considering her BP today, I would not recommend she start, unless on a very low dose and for renal protection.  But she is also somewhat questionable historian.  No c/o hyopglycemia.       FBS--forgot logs--keeps telling me her BP reading.        Review of Systems   Constitutional:  Negative for activity change and fatigue.        Blood pressure 112/64, pulse 69, height 5' 2" (1.575 m), weight 67.2 kg (148 lb 2.4 oz), SpO2 98%.   HENT:  Negative for hearing loss and trouble swallowing.    Eyes:  Negative for photophobia and visual disturbance.        Last Eye Exam:    Respiratory:  Negative for cough and shortness of breath.    Cardiovascular:  Negative for chest pain and palpitations.   Gastrointestinal:  " "Negative for constipation and diarrhea.   Genitourinary:  Negative for frequency and urgency.   Musculoskeletal:  Negative for arthralgias and myalgias.   Integumentary:  Negative for rash and wound.   Neurological:  Negative for weakness and numbness.   Psychiatric/Behavioral:  Negative for sleep disturbance. The patient is not nervous/anxious.           Objective     Physical Exam  Constitutional:       Appearance: Normal appearance. She is normal weight.   HENT:      Head: Normocephalic and atraumatic.      Nose: Nose normal.   Eyes:      Extraocular Movements: Extraocular movements intact.      Conjunctiva/sclera: Conjunctivae normal.      Pupils: Pupils are equal, round, and reactive to light.   Cardiovascular:      Rate and Rhythm: Normal rate and regular rhythm.      Pulses: Normal pulses.      Heart sounds: Normal heart sounds.   Pulmonary:      Effort: Pulmonary effort is normal.      Breath sounds: Normal breath sounds.   Musculoskeletal:         General: Normal range of motion.      Cervical back: Normal range of motion and neck supple.      Right lower leg: No edema.      Left lower leg: No edema.      Comments: Feet:   Lymphadenopathy:      Cervical: No cervical adenopathy.   Skin:     General: Skin is warm and dry.   Neurological:      General: No focal deficit present.      Mental Status: She is alert and oriented to person, place, and time.   Psychiatric:         Mood and Affect: Mood normal.         Behavior: Behavior normal.         Thought Content: Thought content normal.         Judgment: Judgment normal.              /64 (BP Location: Right arm, Patient Position: Sitting, BP Method: Medium (Manual))   Pulse 69   Ht 5' 2" (1.575 m)   Wt 67.2 kg (148 lb 2.4 oz)   SpO2 98%   BMI 27.10 kg/m²     Hemoglobin A1C   Date Value Ref Range Status   11/14/2023 6.0 (H) 4.0 - 5.6 % Final     Comment:     ADA Screening Guidelines:  5.7-6.4%  Consistent with prediabetes  >or=6.5%  Consistent with " diabetes    High levels of fetal hemoglobin interfere with the HbA1C  assay. Heterozygous hemoglobin variants (HbS, HgC, etc)do  not significantly interfere with this assay.   However, presence of multiple variants may affect accuracy.     03/16/2023 6.7 (H) 4.0 - 5.6 % Final     Comment:     ADA Screening Guidelines:  5.7-6.4%  Consistent with prediabetes  >or=6.5%  Consistent with diabetes    High levels of fetal hemoglobin interfere with the HbA1C  assay. Heterozygous hemoglobin variants (HbS, HgC, etc)do  not significantly interfere with this assay.   However, presence of multiple variants may affect accuracy.     10/10/2022 6.8 (H) 4.0 - 5.6 % Final     Comment:     ADA Screening Guidelines:  5.7-6.4%  Consistent with prediabetes  >or=6.5%  Consistent with diabetes    High levels of fetal hemoglobin interfere with the HbA1C  assay. Heterozygous hemoglobin variants (HbS, HgC, etc)do  not significantly interfere with this assay.   However, presence of multiple variants may affect accuracy.         Chemistry        Component Value Date/Time     11/14/2023 1124    K 3.9 11/14/2023 1124     11/14/2023 1124    CO2 25 11/14/2023 1124    BUN 13 11/14/2023 1124    CREATININE 1.4 11/14/2023 1124     (H) 11/14/2023 1124        Component Value Date/Time    CALCIUM 9.1 11/14/2023 1124    ALKPHOS 39 (L) 11/14/2023 1124    AST 15 11/14/2023 1124    ALT 8 (L) 11/14/2023 1124    BILITOT 0.2 11/14/2023 1124    ESTGFRAFRICA 45.4 (A) 07/08/2022 0750    EGFRNONAA 39.4 (A) 07/08/2022 0750          Lab Results   Component Value Date    CHOL 219 (H) 11/14/2023     Lab Results   Component Value Date    HDL 66 11/14/2023     Lab Results   Component Value Date    LDLCALC 138.6 11/14/2023     Lab Results   Component Value Date    TRIG 72 11/14/2023     Lab Results   Component Value Date    CHOLHDL 30.1 11/14/2023     Lab Results   Component Value Date    MICALBCREAT 42.5 (H) 11/14/2023     Lab Results   Component Value  Date    TSH 1.527 11/14/2023     Vit D, 25-Hydroxy   Date Value Ref Range Status   09/13/2021 60 30 - 96 ng/mL Final     Comment:     Vitamin D deficiency.........<10 ng/mL                              Vitamin D insufficiency......10-29 ng/mL       Vitamin D sufficiency........> or equal to 30 ng/mL  Vitamin D toxicity............>100 ng/mL         Assessment and Plan     1. Type 2 diabetes mellitus with stage 3b chronic kidney disease, without long-term current use of insulin  Comprehensive Metabolic Panel    Hemoglobin A1C    Lipid Panel    Microalbumin/Creatinine Ratio, Urine      2. Essential hypertension            PLAN     6 mo with fasting cmp, lipids, A1c, urine m/c

## 2024-06-04 PROBLEM — E11.22 TYPE 2 DIABETES MELLITUS WITH STAGE 3B CHRONIC KIDNEY DISEASE, WITHOUT LONG-TERM CURRENT USE OF INSULIN: Status: ACTIVE | Noted: 2024-06-04

## 2024-06-04 PROBLEM — Z79.4 TYPE 2 DIABETES MELLITUS WITH MICROALBUMINURIA, WITH LONG-TERM CURRENT USE OF INSULIN: Status: ACTIVE | Noted: 2024-06-04

## 2024-06-04 PROBLEM — N18.32 TYPE 2 DIABETES MELLITUS WITH STAGE 3B CHRONIC KIDNEY DISEASE, WITHOUT LONG-TERM CURRENT USE OF INSULIN: Status: ACTIVE | Noted: 2024-06-04

## 2024-06-04 PROBLEM — R80.9 TYPE 2 DIABETES MELLITUS WITH MICROALBUMINURIA, WITH LONG-TERM CURRENT USE OF INSULIN: Status: ACTIVE | Noted: 2024-06-04

## 2024-06-04 PROBLEM — E11.29 TYPE 2 DIABETES MELLITUS WITH MICROALBUMINURIA, WITH LONG-TERM CURRENT USE OF INSULIN: Status: ACTIVE | Noted: 2024-06-04

## 2024-06-04 PROBLEM — N18.32 STAGE 3B CHRONIC KIDNEY DISEASE: Status: ACTIVE | Noted: 2024-06-04

## 2024-06-10 ENCOUNTER — TELEPHONE (OUTPATIENT)
Dept: ENDOCRINOLOGY | Facility: CLINIC | Age: 79
End: 2024-06-10
Payer: MEDICARE

## 2024-06-10 NOTE — TELEPHONE ENCOUNTER
----- Message from Shannan Baez sent at 6/10/2024 11:58 AM CDT -----  Contact: [employer] casey/[employer]X  Type:  Needs Medical Advice    Who Called:  Pietro peña/[employer]  Best Call Back Number:  [phone] Fax: SAME  Additional Information: Recent Kidney Function and A1C to discuss pt metformin usage.   Please call to advise... Thank you...

## 2024-10-16 ENCOUNTER — LAB VISIT (OUTPATIENT)
Dept: LAB | Facility: HOSPITAL | Age: 79
End: 2024-10-16
Attending: NURSE PRACTITIONER
Payer: MEDICARE

## 2024-10-16 DIAGNOSIS — E11.22 TYPE 2 DIABETES MELLITUS WITH STAGE 3B CHRONIC KIDNEY DISEASE, WITHOUT LONG-TERM CURRENT USE OF INSULIN: ICD-10-CM

## 2024-10-16 DIAGNOSIS — N18.32 TYPE 2 DIABETES MELLITUS WITH STAGE 3B CHRONIC KIDNEY DISEASE, WITHOUT LONG-TERM CURRENT USE OF INSULIN: ICD-10-CM

## 2024-10-23 ENCOUNTER — OFFICE VISIT (OUTPATIENT)
Dept: ENDOCRINOLOGY | Facility: CLINIC | Age: 79
End: 2024-10-23
Payer: MEDICARE

## 2024-10-23 VITALS
SYSTOLIC BLOOD PRESSURE: 170 MMHG | OXYGEN SATURATION: 98 % | HEIGHT: 62 IN | BODY MASS INDEX: 27.25 KG/M2 | WEIGHT: 148.06 LBS | HEART RATE: 62 BPM | DIASTOLIC BLOOD PRESSURE: 76 MMHG

## 2024-10-23 DIAGNOSIS — N18.32 STAGE 3B CHRONIC KIDNEY DISEASE: ICD-10-CM

## 2024-10-23 DIAGNOSIS — N18.32 TYPE 2 DIABETES MELLITUS WITH STAGE 3B CHRONIC KIDNEY DISEASE, WITHOUT LONG-TERM CURRENT USE OF INSULIN: Primary | ICD-10-CM

## 2024-10-23 DIAGNOSIS — I10 ESSENTIAL HYPERTENSION: ICD-10-CM

## 2024-10-23 DIAGNOSIS — E11.22 TYPE 2 DIABETES MELLITUS WITH STAGE 3B CHRONIC KIDNEY DISEASE, WITHOUT LONG-TERM CURRENT USE OF INSULIN: Primary | ICD-10-CM

## 2024-10-23 PROCEDURE — 1125F AMNT PAIN NOTED PAIN PRSNT: CPT | Mod: CPTII,S$GLB,, | Performed by: NURSE PRACTITIONER

## 2024-10-23 PROCEDURE — 1159F MED LIST DOCD IN RCRD: CPT | Mod: CPTII,S$GLB,, | Performed by: NURSE PRACTITIONER

## 2024-10-23 PROCEDURE — 3288F FALL RISK ASSESSMENT DOCD: CPT | Mod: CPTII,S$GLB,, | Performed by: NURSE PRACTITIONER

## 2024-10-23 PROCEDURE — 99999 PR PBB SHADOW E&M-EST. PATIENT-LVL IV: CPT | Mod: PBBFAC,,, | Performed by: NURSE PRACTITIONER

## 2024-10-23 PROCEDURE — 3078F DIAST BP <80 MM HG: CPT | Mod: CPTII,S$GLB,, | Performed by: NURSE PRACTITIONER

## 2024-10-23 PROCEDURE — 3077F SYST BP >= 140 MM HG: CPT | Mod: CPTII,S$GLB,, | Performed by: NURSE PRACTITIONER

## 2024-10-23 PROCEDURE — 1101F PT FALLS ASSESS-DOCD LE1/YR: CPT | Mod: CPTII,S$GLB,, | Performed by: NURSE PRACTITIONER

## 2024-10-23 PROCEDURE — 99214 OFFICE O/P EST MOD 30 MIN: CPT | Mod: S$GLB,,, | Performed by: NURSE PRACTITIONER

## 2024-10-23 RX ORDER — MELOXICAM 7.5 MG/1
7.5 TABLET ORAL DAILY PRN
COMMUNITY
Start: 2024-07-25

## 2024-10-23 NOTE — PATIENT INSTRUCTIONS
I added Jardicance, 10 mg--this helps your glucoses, protects and improves your kidney function, and helps blood pressure.     Take it in the morning, and make sure to drink plenty of water

## 2024-10-23 NOTE — PROGRESS NOTES
Subjective     Patient ID: Swetha Thompson is a 78 y.o. female.    Chief Complaint: Diabetes    HPIwas diagnosed with Type 2 DM in her 50s. Initial treatment consisted of oral medications and insulin was added in ~2011. Actos was added by PCP in 2018. Insulin was discontinued in 2018, following hypoglycemia and significant decrease in insulin needs. However, her readings increased >150 mg/dL so she resumed low dose Tresiba. + FH of DM in mother, brother, sister, maternal GM. Past ER visit on 6/26/18 via EMS, due to hypoglycemia in the 30s, which caused loss of consciousness at home. Insulin doses were adjusted at this time. Tresiba later discontinued in 2019, due to hypoglycemia on low dose of 10 units daily.     Interim Events:--Oct 23, 2024:  Saw her Dr. Parsons last month who emphasized her need to take BP meds.   She did not take meds this AM, so unsure of losartan benefit. Poor historian. No focal complaints.  Only on metformin.  No focal complaints or c.o hypoglycemia.       Checks glucoses usually BID  Reports fbs .   Lunch--can't quantify.     April 25, 2024:  Pt new to me. Usually sees YAAKOV Gómez NP and last seen in November. Pt desired to change care to [facility].  Pt upset because someone came to her house, saying they were a nurse from [facility] and indicated she is in renal failure from DM.  The labs I reviewed indicated some decrease function, but likely age related.  She is alone.  She is only taking metformin.  She is not taking Toprol, simvastatin or Losartan--because she states her doctor has not refilled--but considering her BP today, I would not recommend she start, unless on a very low dose and for renal protection.  But she is also somewhat questionable historian.  No c/o hyopglycemia.       FBS--forgot logs--keeps telling me her BP reading.        Review of Systems   Constitutional:  Negative for activity change and fatigue.   HENT:  Negative for hearing loss and  "trouble swallowing.    Eyes:  Negative for photophobia and visual disturbance.        Last Eye Exam:    Respiratory:  Negative for cough and shortness of breath.    Cardiovascular:  Negative for chest pain and palpitations.   Gastrointestinal:  Negative for constipation and diarrhea.   Genitourinary:  Negative for frequency and urgency.   Musculoskeletal:  Negative for arthralgias and myalgias.   Integumentary:  Negative for rash and wound.   Neurological:  Negative for weakness and numbness.   Psychiatric/Behavioral:  Negative for sleep disturbance. The patient is not nervous/anxious.           Objective     Physical Exam  Constitutional:       Appearance: Normal appearance. She is normal weight.   HENT:      Head: Normocephalic and atraumatic.      Nose: Nose normal.   Eyes:      Extraocular Movements: Extraocular movements intact.      Conjunctiva/sclera: Conjunctivae normal.      Pupils: Pupils are equal, round, and reactive to light.   Cardiovascular:      Rate and Rhythm: Normal rate and regular rhythm.      Pulses: Normal pulses.      Heart sounds: Murmur heard.   Pulmonary:      Effort: Pulmonary effort is normal.      Breath sounds: Normal breath sounds.   Musculoskeletal:         General: Normal range of motion.      Cervical back: Normal range of motion and neck supple.      Right lower leg: No edema.      Left lower leg: No edema.      Comments: Feet:   Lymphadenopathy:      Cervical: No cervical adenopathy.   Skin:     General: Skin is warm and dry.   Neurological:      General: No focal deficit present.      Mental Status: She is alert and oriented to person, place, and time.   Psychiatric:         Mood and Affect: Mood normal.         Behavior: Behavior normal.         Thought Content: Thought content normal.         Judgment: Judgment normal.         BP (!) 170/76 (BP Location: Right arm, Patient Position: Sitting)   Pulse 62   Ht 5' 2" (1.575 m)   Wt 67.1 kg (148 lb 0.6 oz)   SpO2 98%   BMI " 27.08 kg/m²     Hemoglobin A1C   Date Value Ref Range Status   10/16/2024 7.8 (H) 4.0 - 5.6 % Final     Comment:     ADA Screening Guidelines:  5.7-6.4%  Consistent with prediabetes  >or=6.5%  Consistent with diabetes    High levels of fetal hemoglobin interfere with the HbA1C  assay. Heterozygous hemoglobin variants (HbS, HgC, etc)do  not significantly interfere with this assay.   However, presence of multiple variants may affect accuracy.     06/04/2024 7.9 (H) 0.0 - 5.6 % Final     Comment:     Reference Interval:  5.0 - 5.6 Normal   5.7 - 6.4 High Risk   > 6.5 Diabetic      Hgb A1c results are standardized based on the (NGSP) National   Glycohemoglobin Standardization Program.      Hemoglobin A1C levels are related to mean serum/plasma glucose   during the preceding 2-3 months.        11/14/2023 6.0 (H) 4.0 - 5.6 % Final     Comment:     ADA Screening Guidelines:  5.7-6.4%  Consistent with prediabetes  >or=6.5%  Consistent with diabetes    High levels of fetal hemoglobin interfere with the HbA1C  assay. Heterozygous hemoglobin variants (HbS, HgC, etc)do  not significantly interfere with this assay.   However, presence of multiple variants may affect accuracy.         Chemistry        Component Value Date/Time     10/16/2024 0735    K 3.9 10/16/2024 0735     10/16/2024 0735    CO2 25 10/16/2024 0735    BUN 17 10/16/2024 0735    CREATININE 1.4 10/16/2024 0735     (H) 10/16/2024 0735        Component Value Date/Time    CALCIUM 9.4 10/16/2024 0735    ALKPHOS 54 (L) 10/16/2024 0735    AST 12 10/16/2024 0735    ALT 6 (L) 10/16/2024 0735    BILITOT 0.4 10/16/2024 0735    ESTGFRAFRICA 45.4 (A) 07/08/2022 0750    EGFRNONAA 39.4 (A) 07/08/2022 0750          Lab Results   Component Value Date    CHOL 219 (H) 10/16/2024     Lab Results   Component Value Date    HDL 73 10/16/2024     Lab Results   Component Value Date    LDLCALC 131.2 10/16/2024     Lab Results   Component Value Date    TRIG 74 10/16/2024      Lab Results   Component Value Date    CHOLHDL 33.3 10/16/2024     Lab Results   Component Value Date    MICALBCREAT 42.5 (H) 11/14/2023     Lab Results   Component Value Date    TSH 1.527 11/14/2023     Vit D, 25-Hydroxy   Date Value Ref Range Status   09/13/2021 60 30 - 96 ng/mL Final     Comment:     Vitamin D deficiency.........<10 ng/mL                              Vitamin D insufficiency......10-29 ng/mL       Vitamin D sufficiency........> or equal to 30 ng/mL  Vitamin D toxicity............>100 ng/mL                Assessment and Plan     1. Type 2 diabetes mellitus with stage 3b chronic kidney disease, without long-term current use of insulin  empagliflozin (JARDIANCE) 10 mg tablet    Hemoglobin A1C    Hemoglobin A1C      2. Essential hypertension        3. Stage 3b chronic kidney disease              PLAN   Will add jardiance--though I expect push back from financial aspect, but would benefit renal, bp and no risk of hypoglycemia. Reinforced water intake.     4 mo with A1c prior

## 2024-11-22 ENCOUNTER — PATIENT MESSAGE (OUTPATIENT)
Dept: PODIATRY | Facility: CLINIC | Age: 79
End: 2024-11-22
Payer: MEDICARE

## 2025-01-02 PROBLEM — E11.3212 TYPE 2 DIABETES MELLITUS WITH LEFT EYE AFFECTED BY MILD NONPROLIFERATIVE RETINOPATHY AND MACULAR EDEMA, WITHOUT LONG-TERM CURRENT USE OF INSULIN: Status: ACTIVE | Noted: 2025-01-02

## 2025-02-26 ENCOUNTER — OFFICE VISIT (OUTPATIENT)
Dept: ENDOCRINOLOGY | Facility: CLINIC | Age: 80
End: 2025-02-26
Payer: MEDICARE

## 2025-02-26 ENCOUNTER — LAB VISIT (OUTPATIENT)
Dept: LAB | Facility: HOSPITAL | Age: 80
End: 2025-02-26
Attending: NURSE PRACTITIONER
Payer: MEDICARE

## 2025-02-26 VITALS
HEIGHT: 62 IN | WEIGHT: 145.38 LBS | HEART RATE: 73 BPM | BODY MASS INDEX: 26.75 KG/M2 | DIASTOLIC BLOOD PRESSURE: 64 MMHG | SYSTOLIC BLOOD PRESSURE: 136 MMHG

## 2025-02-26 DIAGNOSIS — E11.22 TYPE 2 DIABETES MELLITUS WITH STAGE 3B CHRONIC KIDNEY DISEASE, WITHOUT LONG-TERM CURRENT USE OF INSULIN: ICD-10-CM

## 2025-02-26 DIAGNOSIS — N18.32 TYPE 2 DIABETES MELLITUS WITH STAGE 3B CHRONIC KIDNEY DISEASE, WITHOUT LONG-TERM CURRENT USE OF INSULIN: Primary | ICD-10-CM

## 2025-02-26 DIAGNOSIS — E11.22 TYPE 2 DIABETES MELLITUS WITH STAGE 3B CHRONIC KIDNEY DISEASE, WITHOUT LONG-TERM CURRENT USE OF INSULIN: Primary | ICD-10-CM

## 2025-02-26 DIAGNOSIS — N18.32 TYPE 2 DIABETES MELLITUS WITH STAGE 3B CHRONIC KIDNEY DISEASE, WITHOUT LONG-TERM CURRENT USE OF INSULIN: ICD-10-CM

## 2025-02-26 DIAGNOSIS — I10 ESSENTIAL HYPERTENSION: ICD-10-CM

## 2025-02-26 LAB
ESTIMATED AVG GLUCOSE: 180 MG/DL (ref 68–131)
HBA1C MFR BLD: 7.9 % (ref 4–5.6)

## 2025-02-26 PROCEDURE — 83036 HEMOGLOBIN GLYCOSYLATED A1C: CPT | Performed by: NURSE PRACTITIONER

## 2025-02-26 PROCEDURE — 1159F MED LIST DOCD IN RCRD: CPT | Mod: CPTII,S$GLB,, | Performed by: NURSE PRACTITIONER

## 2025-02-26 PROCEDURE — 3075F SYST BP GE 130 - 139MM HG: CPT | Mod: CPTII,S$GLB,, | Performed by: NURSE PRACTITIONER

## 2025-02-26 PROCEDURE — 3078F DIAST BP <80 MM HG: CPT | Mod: CPTII,S$GLB,, | Performed by: NURSE PRACTITIONER

## 2025-02-26 PROCEDURE — 1126F AMNT PAIN NOTED NONE PRSNT: CPT | Mod: CPTII,S$GLB,, | Performed by: NURSE PRACTITIONER

## 2025-02-26 PROCEDURE — 99214 OFFICE O/P EST MOD 30 MIN: CPT | Mod: S$GLB,,, | Performed by: NURSE PRACTITIONER

## 2025-02-26 PROCEDURE — 99999 PR PBB SHADOW E&M-EST. PATIENT-LVL IV: CPT | Mod: PBBFAC,,, | Performed by: NURSE PRACTITIONER

## 2025-02-26 PROCEDURE — 1101F PT FALLS ASSESS-DOCD LE1/YR: CPT | Mod: CPTII,S$GLB,, | Performed by: NURSE PRACTITIONER

## 2025-02-26 PROCEDURE — 36415 COLL VENOUS BLD VENIPUNCTURE: CPT | Mod: PO | Performed by: NURSE PRACTITIONER

## 2025-02-26 PROCEDURE — 3288F FALL RISK ASSESSMENT DOCD: CPT | Mod: CPTII,S$GLB,, | Performed by: NURSE PRACTITIONER

## 2025-02-26 NOTE — PATIENT INSTRUCTIONS
You should be taking 10 mg Jardiance every morning,  and make sure you drink plenty of water.        Also you should be taking 2 500 mg xr metformin pills in the AM

## 2025-02-26 NOTE — PROGRESS NOTES
Subjective     Patient ID: Swetha Thompson is a 79 y.o. female.    Chief Complaint: Diabetes    HPIwas diagnosed with Type 2 DM in her 50s. Initial treatment consisted of oral medications and insulin was added in ~2011. Actos was added by PCP in 2018. Insulin was discontinued in 2018, following hypoglycemia and significant decrease in insulin needs. However, her readings increased >150 mg/dL so she resumed low dose Tresiba. + FH of DM in mother, brother, sister, maternal GM. Past ER visit on 6/26/18 via EMS, due to hypoglycemia in the 30s, which caused loss of consciousness at home. Insulin doses were adjusted at this time. Tresiba later discontinued in 2019, due to hypoglycemia on low dose of 10 units daily.     Interim Events:--Feb 25, 2025:  Last visit I added Jardiance. But it is very questionable if she is taking it.  She thinks DR. Parsons also ordered. No glucose logs. BUt states checking glucoses TID and usually in low 100 range, but 228 this morning--but she contributes that to her son aggravating her. She also has a frontal sinus headache.      Current DM meds:   metformin xr 1000 mg only taking qam, jardiance 10 --unsure if she is taking        Failed DM meds:  na        Back up plan for insulin pump hiatus:   Statin: simvastatin 10 mg        Not tolerated statin : na   ACE/ARB:losartan 100 mg        Not tolerated ACE/ARB: na   Known Diabetic complications: renal         Passwords for [employer]: na       Oct 23, 2024:  Saw her Dr. Parsons last month who emphasized her need to take BP meds.   She did not take meds this AM, so unsure of losartan benefit. Poor historian. No focal complaints.  Only on metformin.  No focal complaints or c.o hypoglycemia.       Checks glucoses usually BID  Reports fbs .   Lunch--can't quantify.     April 25, 2024:  Pt new to me. Usually sees YAAKOV Gómez NP and last seen in November. Pt desired to change care to [facility].  Pt upset because someone came  to her house, saying they were a nurse from kidney doctor and indicated she is in renal failure from DM.  The labs I reviewed indicated some decrease function, but likely age related.  She is alone.  She is only taking metformin.  She is not taking Toprol, simvastatin or Losartan--because she states her doctor has not refilled--but considering her BP today, I would not recommend she start, unless on a very low dose and for renal protection.  But she is also somewhat questionable historian.  No c/o hyopglycemia.       FBS--forgot logs--keeps telling me her BP reading.        Review of Systems   Constitutional:  Negative for activity change and fatigue.   HENT:  Negative for hearing loss and trouble swallowing.    Eyes:  Negative for photophobia and visual disturbance.        Last Eye Exam:    Respiratory:  Negative for cough and shortness of breath.    Cardiovascular:  Negative for chest pain and palpitations.   Gastrointestinal:  Negative for constipation and diarrhea.   Genitourinary:  Negative for frequency and urgency.   Musculoskeletal:  Negative for arthralgias and myalgias.   Integumentary:  Negative for rash and wound.   Neurological:  Positive for headaches. Negative for weakness and numbness.   Psychiatric/Behavioral:  Negative for sleep disturbance. The patient is not nervous/anxious.           Objective     Physical Exam  Constitutional:       Appearance: Normal appearance. She is normal weight.   HENT:      Head: Normocephalic and atraumatic.      Nose: Nose normal.      Mouth/Throat:      Mouth: Mucous membranes are moist.      Pharynx: Oropharynx is clear.   Eyes:      Extraocular Movements: Extraocular movements intact.      Conjunctiva/sclera: Conjunctivae normal.      Pupils: Pupils are equal, round, and reactive to light.   Neck:      Vascular: No carotid bruit.   Cardiovascular:      Rate and Rhythm: Normal rate and regular rhythm.      Pulses: Normal pulses.      Heart sounds: Murmur heard.  "  Pulmonary:      Effort: Pulmonary effort is normal.      Breath sounds: Normal breath sounds.   Musculoskeletal:         General: Normal range of motion.      Cervical back: Normal range of motion and neck supple.      Right lower leg: No edema.      Left lower leg: No edema.      Comments: Feet:   Lymphadenopathy:      Cervical: No cervical adenopathy.   Skin:     General: Skin is warm and dry.   Neurological:      General: No focal deficit present.      Mental Status: She is alert and oriented to person, place, and time.   Psychiatric:         Mood and Affect: Mood normal.         Behavior: Behavior normal.         Thought Content: Thought content normal.         Judgment: Judgment normal.       /64   Pulse 73   Ht 5' 2" (1.575 m)   Wt 65.9 kg (145 lb 6.3 oz)   BMI 26.59 kg/m²     Hemoglobin A1C   Date Value Ref Range Status   10/16/2024 7.8 (H) 4.0 - 5.6 % Final     Comment:     ADA Screening Guidelines:  5.7-6.4%  Consistent with prediabetes  >or=6.5%  Consistent with diabetes    High levels of fetal hemoglobin interfere with the HbA1C  assay. Heterozygous hemoglobin variants (HbS, HgC, etc)do  not significantly interfere with this assay.   However, presence of multiple variants may affect accuracy.     06/04/2024 7.9 (H) 0.0 - 5.6 % Final     Comment:     Reference Interval:  5.0 - 5.6 Normal   5.7 - 6.4 High Risk   > 6.5 Diabetic      Hgb A1c results are standardized based on the (NGSP) National   Glycohemoglobin Standardization Program.      Hemoglobin A1C levels are related to mean serum/plasma glucose   during the preceding 2-3 months.        11/14/2023 6.0 (H) 4.0 - 5.6 % Final     Comment:     ADA Screening Guidelines:  5.7-6.4%  Consistent with prediabetes  >or=6.5%  Consistent with diabetes    High levels of fetal hemoglobin interfere with the HbA1C  assay. Heterozygous hemoglobin variants (HbS, HgC, etc)do  not significantly interfere with this assay.   However, presence of multiple " variants may affect accuracy.         Chemistry        Component Value Date/Time     10/16/2024 0735    K 3.9 10/16/2024 0735     10/16/2024 0735    CO2 25 10/16/2024 0735    BUN 17 10/16/2024 0735    CREATININE 1.4 10/16/2024 0735     (H) 10/16/2024 0735        Component Value Date/Time    CALCIUM 9.4 10/16/2024 0735    ALKPHOS 54 (L) 10/16/2024 0735    AST 12 10/16/2024 0735    ALT 6 (L) 10/16/2024 0735    BILITOT 0.4 10/16/2024 0735    ESTGFRAFRICA 45.4 (A) 07/08/2022 0750    EGFRNONAA 39.4 (A) 07/08/2022 0750          Lab Results   Component Value Date    CHOL 219 (H) 10/16/2024     Lab Results   Component Value Date    HDL 73 10/16/2024     Lab Results   Component Value Date    LDLCALC 131.2 10/16/2024     Lab Results   Component Value Date    TRIG 74 10/16/2024     Lab Results   Component Value Date    CHOLHDL 33.3 10/16/2024     Lab Results   Component Value Date    MICALBCREAT 42.5 (H) 11/14/2023     Lab Results   Component Value Date    TSH 1.527 11/14/2023     Vit D, 25-Hydroxy   Date Value Ref Range Status   09/13/2021 60 30 - 96 ng/mL Final     Comment:     Vitamin D deficiency.........<10 ng/mL                              Vitamin D insufficiency......10-29 ng/mL       Vitamin D sufficiency........> or equal to 30 ng/mL  Vitamin D toxicity............>100 ng/mL              Assessment and Plan   1. Type 2 diabetes mellitus with stage 3b chronic kidney disease, without long-term current use of insulin  Hemoglobin A1C    Hemoglobin A1C      2. Essential hypertension              PLAN   Can stick wit metformin 1000 mg xr qam right now.   Advised to go check her meds and see if she is taking jardiance.      02/26/2025   A1c today    4 mo with A1c prior

## 2025-05-07 ENCOUNTER — HOSPITAL ENCOUNTER (OUTPATIENT)
Dept: RADIOLOGY | Facility: HOSPITAL | Age: 80
Discharge: HOME | End: 2025-05-07
Attending: ANESTHESIOLOGY
Payer: MEDICARE

## 2025-05-07 ENCOUNTER — OFFICE VISIT (OUTPATIENT)
Dept: PAIN MEDICINE | Facility: CLINIC | Age: 80
End: 2025-05-07
Payer: MEDICARE

## 2025-05-07 VITALS
DIASTOLIC BLOOD PRESSURE: 70 MMHG | SYSTOLIC BLOOD PRESSURE: 181 MMHG | HEIGHT: 62 IN | BODY MASS INDEX: 28.42 KG/M2 | HEART RATE: 65 BPM | WEIGHT: 154.44 LBS

## 2025-05-07 DIAGNOSIS — M54.9 DORSALGIA, UNSPECIFIED: Primary | ICD-10-CM

## 2025-05-07 DIAGNOSIS — M54.9 DORSALGIA, UNSPECIFIED: ICD-10-CM

## 2025-05-07 DIAGNOSIS — G89.4 CHRONIC PAIN SYNDROME: ICD-10-CM

## 2025-05-07 DIAGNOSIS — M54.16 LUMBAR RADICULOPATHY: ICD-10-CM

## 2025-05-07 PROCEDURE — 1160F RVW MEDS BY RX/DR IN RCRD: CPT | Mod: CPTII,S$GLB,, | Performed by: ANESTHESIOLOGY

## 2025-05-07 PROCEDURE — 1101F PT FALLS ASSESS-DOCD LE1/YR: CPT | Mod: CPTII,S$GLB,, | Performed by: ANESTHESIOLOGY

## 2025-05-07 PROCEDURE — 72114 X-RAY EXAM L-S SPINE BENDING: CPT | Mod: 26,,, | Performed by: RADIOLOGY

## 2025-05-07 PROCEDURE — 1159F MED LIST DOCD IN RCRD: CPT | Mod: CPTII,S$GLB,, | Performed by: ANESTHESIOLOGY

## 2025-05-07 PROCEDURE — 99999 PR PBB SHADOW E&M-EST. PATIENT-LVL V: CPT | Mod: PBBFAC,,, | Performed by: ANESTHESIOLOGY

## 2025-05-07 PROCEDURE — 3078F DIAST BP <80 MM HG: CPT | Mod: CPTII,S$GLB,, | Performed by: ANESTHESIOLOGY

## 2025-05-07 PROCEDURE — 99204 OFFICE O/P NEW MOD 45 MIN: CPT | Mod: S$GLB,,, | Performed by: ANESTHESIOLOGY

## 2025-05-07 PROCEDURE — 3288F FALL RISK ASSESSMENT DOCD: CPT | Mod: CPTII,S$GLB,, | Performed by: ANESTHESIOLOGY

## 2025-05-07 PROCEDURE — 72114 X-RAY EXAM L-S SPINE BENDING: CPT | Mod: TC,PO

## 2025-05-07 PROCEDURE — 3077F SYST BP >= 140 MM HG: CPT | Mod: CPTII,S$GLB,, | Performed by: ANESTHESIOLOGY

## 2025-05-07 PROCEDURE — 1125F AMNT PAIN NOTED PAIN PRSNT: CPT | Mod: CPTII,S$GLB,, | Performed by: ANESTHESIOLOGY
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
## 2025-05-08 ENCOUNTER — TELEPHONE (OUTPATIENT)
Dept: PAIN MEDICINE | Facility: CLINIC | Age: 80
End: 2025-05-08
Payer: MEDICARE

## 2025-05-10 ENCOUNTER — HOSPITAL ENCOUNTER (OUTPATIENT)
Dept: RADIOLOGY | Facility: HOSPITAL | Age: 80
Discharge: HOME | End: 2025-05-10
Attending: ANESTHESIOLOGY
Payer: MEDICARE

## 2025-05-10 DIAGNOSIS — M54.9 DORSALGIA, UNSPECIFIED: ICD-10-CM

## 2025-05-10 PROCEDURE — 72131 CT LUMBAR SPINE W/O DYE: CPT | Mod: 26,,, | Performed by: RADIOLOGY

## 2025-05-10 PROCEDURE — 72131 CT LUMBAR SPINE W/O DYE: CPT | Mod: TC,PO

## 2025-05-15 ENCOUNTER — TELEPHONE (OUTPATIENT)
Dept: PAIN MEDICINE | Facility: CLINIC | Age: 80
End: 2025-05-15
Payer: MEDICARE

## 2025-05-15 NOTE — TELEPHONE ENCOUNTER
I reviewed her CT.  We can schedule for caudal COCO    Physician - Dr Wilburn    Type of Procedure/Injection - Lumbar Epidural  Caudal           Laterality - NA      Priority - Normal      Anxiolysis- Local      Need to hold medication - Yes      NSAIDs for 2 days      Clearance needed - No      Follow up - 3 week

## 2025-05-16 DIAGNOSIS — R79.9 ABNORMAL FINDING OF BLOOD CHEMISTRY, UNSPECIFIED: ICD-10-CM

## 2025-05-16 DIAGNOSIS — M54.16 LUMBAR RADICULOPATHY: Primary | ICD-10-CM

## 2025-05-16 RX ORDER — ALPRAZOLAM 1 MG/1
1 TABLET, ORALLY DISINTEGRATING ORAL ONCE AS NEEDED
OUTPATIENT
Start: 2025-05-16 | End: 2036-10-12

## 2025-06-04 ENCOUNTER — HOSPITAL ENCOUNTER (OUTPATIENT)
Dept: RADIOLOGY | Facility: HOSPITAL | Age: 80
Discharge: HOME | End: 2025-06-04
Attending: ANESTHESIOLOGY | Admitting: ANESTHESIOLOGY
Payer: MEDICARE

## 2025-06-04 ENCOUNTER — HOSPITAL ENCOUNTER (OUTPATIENT)
Facility: HOSPITAL | Age: 80
Discharge: HOME | End: 2025-06-04
Attending: ANESTHESIOLOGY | Admitting: ANESTHESIOLOGY
Payer: MEDICARE

## 2025-06-04 VITALS
HEIGHT: 62 IN | OXYGEN SATURATION: 99 % | BODY MASS INDEX: 28.34 KG/M2 | SYSTOLIC BLOOD PRESSURE: 182 MMHG | DIASTOLIC BLOOD PRESSURE: 80 MMHG | WEIGHT: 154 LBS | RESPIRATION RATE: 20 BRPM | HEART RATE: 61 BPM | TEMPERATURE: 98 F

## 2025-06-04 DIAGNOSIS — M54.50 LOWER BACK PAIN: ICD-10-CM

## 2025-06-04 DIAGNOSIS — M54.16 LUMBAR RADICULOPATHY: Primary | ICD-10-CM

## 2025-06-04 PROCEDURE — 25500020 PHARM REV CODE 255: Mod: PO | Performed by: ANESTHESIOLOGY

## 2025-06-04 PROCEDURE — 63600175 PHARM REV CODE 636 W HCPCS: Mod: PO | Performed by: ANESTHESIOLOGY

## 2025-06-04 PROCEDURE — 62323 NJX INTERLAMINAR LMBR/SAC: CPT | Mod: ,,, | Performed by: ANESTHESIOLOGY

## 2025-06-04 PROCEDURE — A4216 STERILE WATER/SALINE, 10 ML: HCPCS | Mod: PO | Performed by: ANESTHESIOLOGY

## 2025-06-04 PROCEDURE — 62323 NJX INTERLAMINAR LMBR/SAC: CPT | Mod: PO | Performed by: ANESTHESIOLOGY

## 2025-06-04 PROCEDURE — 25000003 PHARM REV CODE 250: Mod: PO | Performed by: ANESTHESIOLOGY

## 2025-06-04 RX ORDER — METHYLPREDNISOLONE ACETATE 80 MG/ML
INJECTION, SUSPENSION INTRA-ARTICULAR; INTRALESIONAL; INTRAMUSCULAR; SOFT TISSUE
Status: DISCONTINUED | OUTPATIENT
Start: 2025-06-04 | End: 2025-06-04 | Stop reason: HOSPADM

## 2025-06-04 RX ORDER — ALPRAZOLAM 0.5 MG/1
1 TABLET, ORALLY DISINTEGRATING ORAL ONCE AS NEEDED
Status: COMPLETED | OUTPATIENT
Start: 2025-06-04 | End: 2025-06-04

## 2025-06-04 RX ORDER — SODIUM CHLORIDE 9 MG/ML
INJECTION, SOLUTION INTRAMUSCULAR; INTRAVENOUS; SUBCUTANEOUS
Status: DISCONTINUED | OUTPATIENT
Start: 2025-06-04 | End: 2025-06-04 | Stop reason: HOSPADM

## 2025-06-04 RX ORDER — BUPIVACAINE HYDROCHLORIDE 2.5 MG/ML
INJECTION, SOLUTION EPIDURAL; INFILTRATION; INTRACAUDAL; PERINEURAL
Status: DISCONTINUED | OUTPATIENT
Start: 2025-06-04 | End: 2025-06-04 | Stop reason: HOSPADM

## 2025-06-04 RX ORDER — LIDOCAINE HYDROCHLORIDE 10 MG/ML
INJECTION, SOLUTION EPIDURAL; INFILTRATION; INTRACAUDAL; PERINEURAL
Status: DISCONTINUED | OUTPATIENT
Start: 2025-06-04 | End: 2025-06-04 | Stop reason: HOSPADM

## 2025-06-04 RX ADMIN — ALPRAZOLAM 0.5 MG: 0.5 TABLET, ORALLY DISINTEGRATING ORAL at 02:06

## 2025-06-04 NOTE — OP NOTE
Procedure Note    Procedure Date: 6/4/2025    Procedure Performed:  Caudal epidural steroid injection under fluoroscopy    Indications:  Swetha Thompson presents with lumbar radiculitis/radiculopathy secondary to disc herniation, osteophyte/osteophyte complexes, and/or severe degenerative disc disease producing foraminal or central spinal stenosis.  The pain has been present for at least 4 weeks and the patient has failed to respond to noninvasive conservative care.  Pain rated by NRS at baseline prior to intervention is 6/10.  Their radiculitis/radiculopathy and/or neurogenic claudication is severe enough to greatly impact their quality of life or function.     Pre-op diagnosis: Lumbar radiculitis/radiculopathy    Post-op diagnosis: same    Physician: Vamshi Wilburn MD    IV anxiolysis medications: none    Medications injected: Depomedrol 80mg, 2ml Bupivacaine 0.25%, 6 mL sterile, preservative-free normal saline.    Local anesthetic used: 1% Lidocaine, 1 ml    Estimated Blood Loss: none    Complications:  none    Technique:   The patient was interviewed in the holding area and Risks/Benefits were discussed, including, but not limited to, the possibility of new or different pain, bleeding or infection.   All questions were answered.  The patient understood and accepted risks.  Consent was reviewed.  A time-out was taken to identify patient and procedure side prior to starting the procedure.  After the patient was placed in prone position, the patient was prepped and draped in the usual sterile fashion using ChloraPrep x3 and sterile towels.  Appropriate anatomic landmarks were determined by identifying the sacral hiatus in the lateral fluoroscopic view.  Local anesthetic was given via a 25g 1.5 inch needle by raising a wheal and infiltrating down to the periosteum.  A 25 gauge 3.5 inch needle was introduced thru the sacral hiatus.  Omnipaque was injected to confirm placement in the appropriate area and that there  was no vascular uptake.  The medication was then injected slowly.  The patient tolerated the procedure well.  The patient was monitored after the procedure.  Patient was given post procedure and discharge instructions to follow at home.  The patient was discharged in a stable condition

## 2025-06-04 NOTE — DISCHARGE INSTRUCTIONS

## 2025-06-04 NOTE — DISCHARGE SUMMARY
[facility]  Discharge Note  Short Stay    Admit Date: 6/4/2025    Discharge Date: 6/4/2025    Attending Physician: Vamshi Wilburn     Discharge Provider: Vamshi Wilburn    Diagnoses:  There are no hospital problems to display for this patient.      Discharged Condition: Good    Final Diagnoses: Lumbar radiculopathy [M54.16]    Disposition: Home or Self Care    Hospital Course: No complications, uneventful    Outcome of Hospitalization, Treatment, Procedure, or Surgery:  Patient was admitted for outpatient interventional pain management procedure. The patient tolerated the procedure well with no complications.    Follow up/Patient Instructions:  Follow up as scheduled in Pain Management office in 2-3 weeks.  Patient has received instructions and follow up date and time.    Medications:  Continue previous medications    Discharge Procedure Orders   Notify your health care provider if you experience any of the following:  temperature >100.4     Notify your health care provider if you experience any of the following:  persistent nausea and vomiting or diarrhea     Notify your health care provider if you experience any of the following:  severe uncontrolled pain     Notify your health care provider if you experience any of the following:  redness, tenderness, or signs of infection (pain, swelling, redness, odor or green/yellow discharge around incision site)     Notify your health care provider if you experience any of the following:  difficulty breathing or increased cough     Notify your health care provider if you experience any of the following:  severe persistent headache     Notify your health care provider if you experience any of the following:  worsening rash     Notify your health care provider if you experience any of the following:  persistent dizziness, light-headedness, or visual disturbances     Notify your health care provider if you experience any of the following:  increased confusion or  weakness     Activity as tolerated

## 2025-06-04 NOTE — INTERVAL H&P NOTE
The patient has been examined and the H&P has been reviewed:    I concur with the findings and no changes have occurred since H&P was written.  We will proceed with caudal COCO. The risks and benefits of this intervention, and alternative therapies were discussed with the patient.  The discussion of risks included infection, bleeding, need for additional procedures or surgery, nerve damage.  Questions regarding the procedure, risks, expected outcome, and possible side effects were solicited and answered to the patient's satisfaction.  Swetha Thompson wishes to proceed with the injection or procedure.  Written consent was obtained.      There are no hospital problems to display for this patient.

## 2025-06-25 ENCOUNTER — OFFICE VISIT (OUTPATIENT)
Dept: PAIN MEDICINE | Facility: CLINIC | Age: 80
End: 2025-06-25
Payer: MEDICARE

## 2025-06-25 VITALS
DIASTOLIC BLOOD PRESSURE: 82 MMHG | HEART RATE: 73 BPM | SYSTOLIC BLOOD PRESSURE: 143 MMHG | BODY MASS INDEX: 26.79 KG/M2 | WEIGHT: 146.5 LBS

## 2025-06-25 DIAGNOSIS — M47.816 LUMBAR SPONDYLOSIS: ICD-10-CM

## 2025-06-25 DIAGNOSIS — M54.16 LUMBAR RADICULOPATHY: Primary | ICD-10-CM

## 2025-06-25 PROCEDURE — 1125F AMNT PAIN NOTED PAIN PRSNT: CPT | Mod: CPTII,S$GLB,, | Performed by: PHYSICIAN ASSISTANT

## 2025-06-25 PROCEDURE — 99215 OFFICE O/P EST HI 40 MIN: CPT | Mod: S$GLB,,, | Performed by: PHYSICIAN ASSISTANT

## 2025-06-25 PROCEDURE — 3077F SYST BP >= 140 MM HG: CPT | Mod: CPTII,S$GLB,, | Performed by: PHYSICIAN ASSISTANT

## 2025-06-25 PROCEDURE — 1160F RVW MEDS BY RX/DR IN RCRD: CPT | Mod: CPTII,S$GLB,, | Performed by: PHYSICIAN ASSISTANT

## 2025-06-25 PROCEDURE — 3288F FALL RISK ASSESSMENT DOCD: CPT | Mod: CPTII,S$GLB,, | Performed by: PHYSICIAN ASSISTANT

## 2025-06-25 PROCEDURE — 1101F PT FALLS ASSESS-DOCD LE1/YR: CPT | Mod: CPTII,S$GLB,, | Performed by: PHYSICIAN ASSISTANT

## 2025-06-25 PROCEDURE — 99999 PR PBB SHADOW E&M-EST. PATIENT-LVL IV: CPT | Mod: PBBFAC,,, | Performed by: PHYSICIAN ASSISTANT

## 2025-06-25 PROCEDURE — 1159F MED LIST DOCD IN RCRD: CPT | Mod: CPTII,S$GLB,, | Performed by: PHYSICIAN ASSISTANT

## 2025-06-25 PROCEDURE — 3079F DIAST BP 80-89 MM HG: CPT | Mod: CPTII,S$GLB,, | Performed by: PHYSICIAN ASSISTANT
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
## 2025-06-26 ENCOUNTER — OFFICE VISIT (OUTPATIENT)
Dept: ENDOCRINOLOGY | Facility: CLINIC | Age: 80
End: 2025-06-26
Payer: MEDICARE

## 2025-06-26 ENCOUNTER — LAB VISIT (OUTPATIENT)
Dept: LAB | Facility: HOSPITAL | Age: 80
End: 2025-06-26
Attending: NURSE PRACTITIONER
Payer: MEDICARE

## 2025-06-26 VITALS
OXYGEN SATURATION: 97 % | WEIGHT: 146.94 LBS | HEART RATE: 72 BPM | HEIGHT: 62 IN | DIASTOLIC BLOOD PRESSURE: 80 MMHG | BODY MASS INDEX: 27.04 KG/M2 | SYSTOLIC BLOOD PRESSURE: 144 MMHG

## 2025-06-26 DIAGNOSIS — N18.32 STAGE 3B CHRONIC KIDNEY DISEASE: ICD-10-CM

## 2025-06-26 DIAGNOSIS — N18.32 TYPE 2 DIABETES MELLITUS WITH STAGE 3B CHRONIC KIDNEY DISEASE, WITHOUT LONG-TERM CURRENT USE OF INSULIN: ICD-10-CM

## 2025-06-26 DIAGNOSIS — N18.32 TYPE 2 DIABETES MELLITUS WITH STAGE 3B CHRONIC KIDNEY DISEASE, WITHOUT LONG-TERM CURRENT USE OF INSULIN: Primary | ICD-10-CM

## 2025-06-26 DIAGNOSIS — I10 ESSENTIAL HYPERTENSION: ICD-10-CM

## 2025-06-26 DIAGNOSIS — E78.5 HYPERLIPIDEMIA, UNSPECIFIED HYPERLIPIDEMIA TYPE: ICD-10-CM

## 2025-06-26 DIAGNOSIS — E11.22 TYPE 2 DIABETES MELLITUS WITH STAGE 3B CHRONIC KIDNEY DISEASE, WITHOUT LONG-TERM CURRENT USE OF INSULIN: ICD-10-CM

## 2025-06-26 DIAGNOSIS — E11.22 TYPE 2 DIABETES MELLITUS WITH STAGE 3B CHRONIC KIDNEY DISEASE, WITHOUT LONG-TERM CURRENT USE OF INSULIN: Primary | ICD-10-CM

## 2025-06-26 LAB
EAG (OHS): 220 MG/DL (ref 68–131)
HBA1C MFR BLD: 9.3 % (ref 4–5.6)

## 2025-06-26 PROCEDURE — 3077F SYST BP >= 140 MM HG: CPT | Mod: CPTII,S$GLB,, | Performed by: NURSE PRACTITIONER

## 2025-06-26 PROCEDURE — 99999 PR PBB SHADOW E&M-EST. PATIENT-LVL IV: CPT | Mod: PBBFAC,,, | Performed by: NURSE PRACTITIONER

## 2025-06-26 PROCEDURE — 1101F PT FALLS ASSESS-DOCD LE1/YR: CPT | Mod: CPTII,S$GLB,, | Performed by: NURSE PRACTITIONER

## 2025-06-26 PROCEDURE — 99214 OFFICE O/P EST MOD 30 MIN: CPT | Mod: S$GLB,,, | Performed by: NURSE PRACTITIONER

## 2025-06-26 PROCEDURE — 1159F MED LIST DOCD IN RCRD: CPT | Mod: CPTII,S$GLB,, | Performed by: NURSE PRACTITIONER

## 2025-06-26 PROCEDURE — 3288F FALL RISK ASSESSMENT DOCD: CPT | Mod: CPTII,S$GLB,, | Performed by: NURSE PRACTITIONER

## 2025-06-26 PROCEDURE — 1125F AMNT PAIN NOTED PAIN PRSNT: CPT | Mod: CPTII,S$GLB,, | Performed by: NURSE PRACTITIONER

## 2025-06-26 PROCEDURE — 36415 COLL VENOUS BLD VENIPUNCTURE: CPT | Mod: PO

## 2025-06-26 PROCEDURE — 83036 HEMOGLOBIN GLYCOSYLATED A1C: CPT

## 2025-06-26 PROCEDURE — 3079F DIAST BP 80-89 MM HG: CPT | Mod: CPTII,S$GLB,, | Performed by: NURSE PRACTITIONER

## 2025-06-26 NOTE — PROGRESS NOTES
Subjective     Patient ID: Swetha Thompson is a 79 y.o. female.    Chief Complaint: Diabetes    HPIwas diagnosed with Type 2 DM in her 50s. Initial treatment consisted of oral medications and insulin was added in ~2011. Actos was added by PCP in 2018. Insulin was discontinued in 2018, following hypoglycemia and significant decrease in insulin needs. However, her readings increased >150 mg/dL so she resumed low dose Tresiba. + FH of DM in mother, brother, sister, maternal GM. Past ER visit on 6/26/18 via EMS, due to hypoglycemia in the 30s, which caused loss of consciousness at home. Insulin doses were adjusted at this time. Tresiba later discontinued in 2019, due to hypoglycemia on low dose of 10 units daily.     Interim Events:--JUne 26, 2025:  States having migraine today.  No photophobia. Some nausea.  States headache started yesterday after seeing back doctor because there was a disagreement as to if she received a steroid injection during a procedure earlier this month.  No other complaints.  States compliance with metformin and jardiance.  Primarily only checking qam--I suspect she is having more prandial glucose excursions later in day driving up A1c.  But some of this could also be effected by pain, so I do not want to blindly add medications without seeing some actual numbers .      Checks glucose every am sometimes BID  FBS usually   If checks later in the day--78 guy.     Current DM meds:   metformin xr 1000 mg only taking qam, jardiance 10 -     Failed DM meds:  na        Back up plan for insulin pump hiatus:   Statin: simvastatin 10 mg        Not tolerated statin : na   ACE/ARB:losartan 100 mg        Not tolerated ACE/ARB: na   Known Diabetic complications: renal         Passwords for [employer]: na         Feb 25, 2025:  Last visit I added Jardiance. But it is very questionable if she is taking it.  She thinks DR. Parsons also ordered. No glucose logs. BUt states checking glucoses TID and  usually in low 100 range, but 228 this morning--but she contributes that to her son aggravating her. She also has a frontal sinus headache.        Oct 23, 2024:  Saw her Dr. Parsons last month who emphasized her need to take BP meds.   She did not take meds this AM, so unsure of losartan benefit. Poor historian. No focal complaints.  Only on metformin.  No focal complaints or c.o hypoglycemia.       Checks glucoses usually BID  Reports fbs .   Lunch--can't quantify.     April 25, 2024:  Pt new to me. Usually sees YAAKOV Gómez NP and last seen in November. Pt desired to change care to [facility].  Pt upset because someone came to her house, saying they were a nurse from kidney doctor and indicated she is in renal failure from DM.  The labs I reviewed indicated some decrease function, but likely age related.  She is alone.  She is only taking metformin.  She is not taking Toprol, simvastatin or Losartan--because she states her doctor has not refilled--but considering her BP today, I would not recommend she start, unless on a very low dose and for renal protection.  But she is also somewhat questionable historian.  No c/o hyopglycemia.       FBS--forgot logs--keeps telling me her BP reading.        Review of Systems   Constitutional:  Negative for activity change and fatigue.   HENT:  Negative for hearing loss and trouble swallowing.    Eyes:  Negative for photophobia and visual disturbance.        Last Eye Exam:    Respiratory:  Negative for cough and shortness of breath.    Cardiovascular:  Negative for chest pain and palpitations.   Gastrointestinal:  Negative for constipation and diarrhea.   Genitourinary:  Negative for frequency and urgency.   Musculoskeletal:  Negative for arthralgias and myalgias.   Integumentary:  Negative for rash and wound.   Neurological:  Positive for headaches. Negative for weakness and numbness.   Psychiatric/Behavioral:  Negative for sleep disturbance. The patient is not  "nervous/anxious.           Objective     Physical Exam  Constitutional:       Appearance: Normal appearance. She is normal weight.   HENT:      Head: Normocephalic and atraumatic.      Nose: Nose normal.      Mouth/Throat:      Mouth: Mucous membranes are moist.      Pharynx: Oropharynx is clear.   Eyes:      Extraocular Movements: Extraocular movements intact.      Conjunctiva/sclera: Conjunctivae normal.      Pupils: Pupils are equal, round, and reactive to light.   Neck:      Vascular: No carotid bruit.   Cardiovascular:      Rate and Rhythm: Normal rate and regular rhythm.      Pulses: Normal pulses.      Heart sounds: Murmur heard.   Pulmonary:      Effort: Pulmonary effort is normal.      Breath sounds: Normal breath sounds.   Musculoskeletal:         General: Normal range of motion.      Cervical back: Normal range of motion and neck supple.      Right lower leg: No edema.      Left lower leg: No edema.      Comments: Feet:   Lymphadenopathy:      Cervical: No cervical adenopathy.   Skin:     General: Skin is warm and dry.   Neurological:      General: No focal deficit present.      Mental Status: She is alert and oriented to person, place, and time.   Psychiatric:         Mood and Affect: Mood normal.         Behavior: Behavior normal.         Thought Content: Thought content normal.         Judgment: Judgment normal.         BP (!) 144/80 (Patient Position: Sitting)   Pulse 72   Ht 5' 2" (1.575 m)   Wt 66.7 kg (146 lb 15 oz)   SpO2 97%   BMI 26.88 kg/m²     Hemoglobin A1C   Date Value Ref Range Status   02/26/2025 7.9 (H) 4.0 - 5.6 % Final     Comment:     ADA Screening Guidelines:  5.7-6.4%  Consistent with prediabetes  >or=6.5%  Consistent with diabetes    High levels of fetal hemoglobin interfere with the HbA1C  assay. Heterozygous hemoglobin variants (HbS, HgC, etc)do  not significantly interfere with this assay.   However, presence of multiple variants may affect accuracy.     10/16/2024 7.8 (H) " 4.0 - 5.6 % Final     Comment:     ADA Screening Guidelines:  5.7-6.4%  Consistent with prediabetes  >or=6.5%  Consistent with diabetes    High levels of fetal hemoglobin interfere with the HbA1C  assay. Heterozygous hemoglobin variants (HbS, HgC, etc)do  not significantly interfere with this assay.   However, presence of multiple variants may affect accuracy.     06/04/2024 7.9 (H) 0.0 - 5.6 % Final     Comment:     Reference Interval:  5.0 - 5.6 Normal   5.7 - 6.4 High Risk   > 6.5 Diabetic      Hgb A1c results are standardized based on the (NGSP) National   Glycohemoglobin Standardization Program.      Hemoglobin A1C levels are related to mean serum/plasma glucose   during the preceding 2-3 months.            Chemistry        Component Value Date/Time     04/03/2025 0956    K 4.1 04/03/2025 0956     04/03/2025 0956    CO2 27 04/03/2025 0956    BUN 21 04/03/2025 0956    CREATININE 1.26 04/03/2025 0956     (H) 04/03/2025 0956        Component Value Date/Time    CALCIUM 8.6 (L) 04/03/2025 0956    ALKPHOS 50 04/03/2025 0956    AST 14 04/03/2025 0956    ALT 8 (L) 04/03/2025 0956    BILITOT 0.4 04/03/2025 0956    ESTGFRAFRICA 45.4 (A) 07/08/2022 0750    EGFRNONAA 39.4 (A) 07/08/2022 0750          Lab Results   Component Value Date    CHOL 219 (H) 10/16/2024     Lab Results   Component Value Date    HDL 73 10/16/2024     Lab Results   Component Value Date    LDLCALC 131.2 10/16/2024     Lab Results   Component Value Date    TRIG 74 10/16/2024     Lab Results   Component Value Date    CHOLHDL 33.3 10/16/2024     Lab Results   Component Value Date    MICALBCREAT 42.5 (H) 11/14/2023     Lab Results   Component Value Date    TSH 1.782 04/03/2025     Vit D, 25-Hydroxy   Date Value Ref Range Status   09/13/2021 60 30 - 96 ng/mL Final     Comment:     Vitamin D deficiency.........<10 ng/mL                              Vitamin D insufficiency......10-29 ng/mL       Vitamin D sufficiency........> or equal to  30 ng/mL  Vitamin D toxicity............>100 ng/mL              Assessment and Plan   1. Type 2 diabetes mellitus with stage 3b chronic kidney disease, without long-term current use of insulin  Hemoglobin A1C      2. Essential hypertension        3. Stage 3b chronic kidney disease        4. Hyperlipidemia, unspecified hyperlipidemia type              On losartan 100 mg or 50 mg--both noted in records   On simvastatin 10 mg     PLAN   Cont metformin 1000 mg xr am  Cont jardiance 10 mg.   Consider cgms pro.     Can stick wit metformin 1000 mg xr qam right now.   Advised to go check her meds and see if she is taking jardiance.        06/26/2025   4  mo with A1c prior__

## (undated) DEVICE — APPLICATOR CHLORAPREP CLR 10.5

## (undated) DEVICE — TRAY NERVE BLOCK

## (undated) DEVICE — GLOVE 7.5 PROTEXIS PI MICRO

## (undated) DEVICE — Device

## (undated) DEVICE — TOWEL OR DISP STRL BLUE 4/PK

## (undated) DEVICE — NDL SPINAL SPINOCAN 22GX3.5

## (undated) DEVICE — MARKER SKIN RULER STERILE